# Patient Record
Sex: MALE | Race: WHITE | NOT HISPANIC OR LATINO | Employment: OTHER | URBAN - METROPOLITAN AREA
[De-identification: names, ages, dates, MRNs, and addresses within clinical notes are randomized per-mention and may not be internally consistent; named-entity substitution may affect disease eponyms.]

---

## 2017-01-30 ENCOUNTER — COMMUNICATION - HEALTHEAST (OUTPATIENT)
Dept: FAMILY MEDICINE | Facility: CLINIC | Age: 62
End: 2017-01-30

## 2017-01-30 DIAGNOSIS — E78.00 PURE HYPERCHOLESTEROLEMIA: ICD-10-CM

## 2017-02-09 ENCOUNTER — COMMUNICATION - HEALTHEAST (OUTPATIENT)
Dept: FAMILY MEDICINE | Facility: CLINIC | Age: 62
End: 2017-02-09

## 2017-02-09 DIAGNOSIS — E11.9 TYPE 2 DIABETES MELLITUS (H): ICD-10-CM

## 2017-02-09 DIAGNOSIS — I10 ESSENTIAL HYPERTENSION, BENIGN: ICD-10-CM

## 2017-03-08 ENCOUNTER — OFFICE VISIT - HEALTHEAST (OUTPATIENT)
Dept: FAMILY MEDICINE | Facility: CLINIC | Age: 62
End: 2017-03-08

## 2017-03-08 DIAGNOSIS — E11.9 TYPE 2 DIABETES MELLITUS (H): ICD-10-CM

## 2017-03-08 DIAGNOSIS — E78.00 PURE HYPERCHOLESTEROLEMIA: ICD-10-CM

## 2017-03-08 DIAGNOSIS — H61.92 SKIN LESION OF LEFT EAR: ICD-10-CM

## 2017-03-08 DIAGNOSIS — R12 HEARTBURN: ICD-10-CM

## 2017-03-08 DIAGNOSIS — I10 ESSENTIAL HYPERTENSION, BENIGN: ICD-10-CM

## 2017-03-08 LAB
CHOLEST SERPL-MCNC: 148 MG/DL
FASTING STATUS PATIENT QL REPORTED: YES
HBA1C MFR BLD: 8.9 % (ref 3.5–6)
HDLC SERPL-MCNC: 42 MG/DL
LDLC SERPL CALC-MCNC: 71 MG/DL
TRIGL SERPL-MCNC: 175 MG/DL

## 2017-03-08 RX ORDER — GLUCOSAMINE HCL/CHONDROITIN SU 500-400 MG
1 CAPSULE ORAL DAILY PRN
Qty: 100 EACH | Refills: 3 | Status: SHIPPED | OUTPATIENT
Start: 2017-03-08 | End: 2021-07-20

## 2017-04-26 ENCOUNTER — COMMUNICATION - HEALTHEAST (OUTPATIENT)
Dept: FAMILY MEDICINE | Facility: CLINIC | Age: 62
End: 2017-04-26

## 2017-04-26 DIAGNOSIS — E78.00 PURE HYPERCHOLESTEROLEMIA: ICD-10-CM

## 2017-05-03 ENCOUNTER — COMMUNICATION - HEALTHEAST (OUTPATIENT)
Dept: FAMILY MEDICINE | Facility: CLINIC | Age: 62
End: 2017-05-03

## 2017-05-25 ENCOUNTER — COMMUNICATION - HEALTHEAST (OUTPATIENT)
Dept: FAMILY MEDICINE | Facility: CLINIC | Age: 62
End: 2017-05-25

## 2017-06-02 ENCOUNTER — AMBULATORY - HEALTHEAST (OUTPATIENT)
Dept: EDUCATION SERVICES | Facility: CLINIC | Age: 62
End: 2017-06-02

## 2017-06-02 DIAGNOSIS — E11.65 UNCONTROLLED TYPE 2 DIABETES MELLITUS WITH HYPERGLYCEMIA, WITHOUT LONG-TERM CURRENT USE OF INSULIN (H): ICD-10-CM

## 2017-06-13 ENCOUNTER — OFFICE VISIT - HEALTHEAST (OUTPATIENT)
Dept: FAMILY MEDICINE | Facility: CLINIC | Age: 62
End: 2017-06-13

## 2017-06-13 ENCOUNTER — AMBULATORY - HEALTHEAST (OUTPATIENT)
Dept: EDUCATION SERVICES | Facility: CLINIC | Age: 62
End: 2017-06-13

## 2017-06-13 DIAGNOSIS — L98.9 SKIN LESION: ICD-10-CM

## 2017-06-13 DIAGNOSIS — E11.9 TYPE 2 DIABETES MELLITUS (H): ICD-10-CM

## 2017-06-13 DIAGNOSIS — E11.9 DIABETES MELLITUS WITHOUT COMPLICATION (H): ICD-10-CM

## 2017-06-13 DIAGNOSIS — R22.2 LUMP OF SKIN OF BACK: ICD-10-CM

## 2017-06-27 ENCOUNTER — AMBULATORY - HEALTHEAST (OUTPATIENT)
Dept: EDUCATION SERVICES | Facility: CLINIC | Age: 62
End: 2017-06-27

## 2017-06-27 ENCOUNTER — COMMUNICATION - HEALTHEAST (OUTPATIENT)
Dept: FAMILY MEDICINE | Facility: CLINIC | Age: 62
End: 2017-06-27

## 2017-06-27 DIAGNOSIS — E11.9 DIABETES MELLITUS WITHOUT COMPLICATION (H): ICD-10-CM

## 2017-06-28 ENCOUNTER — OFFICE VISIT - HEALTHEAST (OUTPATIENT)
Dept: SURGERY | Facility: CLINIC | Age: 62
End: 2017-06-28

## 2017-06-28 DIAGNOSIS — R22.9 LUMP OF SKIN: ICD-10-CM

## 2017-07-03 ENCOUNTER — AMBULATORY - HEALTHEAST (OUTPATIENT)
Dept: SURGERY | Facility: CLINIC | Age: 62
End: 2017-07-03

## 2017-07-14 ENCOUNTER — RECORDS - HEALTHEAST (OUTPATIENT)
Dept: ADMINISTRATIVE | Facility: OTHER | Age: 62
End: 2017-07-14

## 2017-07-14 ENCOUNTER — AMBULATORY - HEALTHEAST (OUTPATIENT)
Dept: SURGERY | Facility: CLINIC | Age: 62
End: 2017-07-14

## 2017-07-30 ENCOUNTER — COMMUNICATION - HEALTHEAST (OUTPATIENT)
Dept: FAMILY MEDICINE | Facility: CLINIC | Age: 62
End: 2017-07-30

## 2017-07-30 DIAGNOSIS — E78.00 PURE HYPERCHOLESTEROLEMIA: ICD-10-CM

## 2017-07-31 ENCOUNTER — RECORDS - HEALTHEAST (OUTPATIENT)
Dept: ADMINISTRATIVE | Facility: OTHER | Age: 62
End: 2017-07-31

## 2017-08-02 ENCOUNTER — OFFICE VISIT - HEALTHEAST (OUTPATIENT)
Dept: SURGERY | Facility: CLINIC | Age: 62
End: 2017-08-02

## 2017-08-02 DIAGNOSIS — Z48.89 POSTOPERATIVE VISIT: ICD-10-CM

## 2017-08-08 ENCOUNTER — RECORDS - HEALTHEAST (OUTPATIENT)
Dept: ADMINISTRATIVE | Facility: OTHER | Age: 62
End: 2017-08-08

## 2017-08-08 ENCOUNTER — AMBULATORY - HEALTHEAST (OUTPATIENT)
Dept: EDUCATION SERVICES | Facility: CLINIC | Age: 62
End: 2017-08-08

## 2017-08-08 DIAGNOSIS — E11.9 DIABETES MELLITUS WITHOUT COMPLICATION (H): ICD-10-CM

## 2017-09-11 ENCOUNTER — COMMUNICATION - HEALTHEAST (OUTPATIENT)
Dept: FAMILY MEDICINE | Facility: CLINIC | Age: 62
End: 2017-09-11

## 2017-09-11 ENCOUNTER — OFFICE VISIT - HEALTHEAST (OUTPATIENT)
Dept: FAMILY MEDICINE | Facility: CLINIC | Age: 62
End: 2017-09-11

## 2017-09-11 DIAGNOSIS — Z13.1 DM (DIABETES MELLITUS SCREEN): ICD-10-CM

## 2017-09-11 LAB — HBA1C MFR BLD: 5.8 % (ref 3.5–6)

## 2017-09-11 ASSESSMENT — MIFFLIN-ST. JEOR: SCORE: 1707.05

## 2017-10-16 ENCOUNTER — OFFICE VISIT - HEALTHEAST (OUTPATIENT)
Dept: FAMILY MEDICINE | Facility: CLINIC | Age: 62
End: 2017-10-16

## 2017-10-16 DIAGNOSIS — M54.9 BACK PAIN: ICD-10-CM

## 2017-10-16 ASSESSMENT — MIFFLIN-ST. JEOR: SCORE: 1707.9

## 2017-10-18 ENCOUNTER — HOSPITAL ENCOUNTER (OUTPATIENT)
Dept: MRI IMAGING | Facility: CLINIC | Age: 62
Discharge: HOME | End: 2017-10-18
Attending: FAMILY MEDICINE

## 2017-10-18 DIAGNOSIS — M54.9 BACK PAIN: ICD-10-CM

## 2017-10-25 ENCOUNTER — COMMUNICATION - HEALTHEAST (OUTPATIENT)
Dept: FAMILY MEDICINE | Facility: CLINIC | Age: 62
End: 2017-10-25

## 2017-10-25 DIAGNOSIS — E11.9 TYPE 2 DIABETES MELLITUS (H): ICD-10-CM

## 2017-10-25 DIAGNOSIS — I10 ESSENTIAL HYPERTENSION, BENIGN: ICD-10-CM

## 2017-10-25 DIAGNOSIS — E78.00 PURE HYPERCHOLESTEROLEMIA: ICD-10-CM

## 2017-10-26 ENCOUNTER — OFFICE VISIT - HEALTHEAST (OUTPATIENT)
Dept: FAMILY MEDICINE | Facility: CLINIC | Age: 62
End: 2017-10-26

## 2017-10-26 DIAGNOSIS — M54.9 BACKACHE: ICD-10-CM

## 2017-10-26 DIAGNOSIS — E11.9 TYPE 2 DIABETES MELLITUS (H): ICD-10-CM

## 2017-10-26 DIAGNOSIS — I10 ESSENTIAL HYPERTENSION, BENIGN: ICD-10-CM

## 2017-10-26 ASSESSMENT — MIFFLIN-ST. JEOR: SCORE: 1730.3

## 2017-10-30 ENCOUNTER — COMMUNICATION - HEALTHEAST (OUTPATIENT)
Dept: FAMILY MEDICINE | Facility: CLINIC | Age: 62
End: 2017-10-30

## 2017-10-30 DIAGNOSIS — E11.9 TYPE 2 DIABETES MELLITUS (H): ICD-10-CM

## 2018-01-25 ENCOUNTER — COMMUNICATION - HEALTHEAST (OUTPATIENT)
Dept: FAMILY MEDICINE | Facility: CLINIC | Age: 63
End: 2018-01-25

## 2018-01-25 DIAGNOSIS — E11.9 TYPE 2 DIABETES MELLITUS (H): ICD-10-CM

## 2018-01-25 DIAGNOSIS — E78.00 PURE HYPERCHOLESTEROLEMIA: ICD-10-CM

## 2018-04-13 ENCOUNTER — COMMUNICATION - HEALTHEAST (OUTPATIENT)
Dept: FAMILY MEDICINE | Facility: CLINIC | Age: 63
End: 2018-04-13

## 2018-04-13 DIAGNOSIS — E11.9 TYPE 2 DIABETES MELLITUS (H): ICD-10-CM

## 2018-04-15 ENCOUNTER — COMMUNICATION - HEALTHEAST (OUTPATIENT)
Dept: FAMILY MEDICINE | Facility: CLINIC | Age: 63
End: 2018-04-15

## 2018-04-15 DIAGNOSIS — I10 ESSENTIAL HYPERTENSION, BENIGN: ICD-10-CM

## 2018-04-15 DIAGNOSIS — N52.9 ED (ERECTILE DYSFUNCTION): ICD-10-CM

## 2018-04-15 DIAGNOSIS — E78.00 PURE HYPERCHOLESTEROLEMIA: ICD-10-CM

## 2018-04-18 ENCOUNTER — COMMUNICATION - HEALTHEAST (OUTPATIENT)
Dept: FAMILY MEDICINE | Facility: CLINIC | Age: 63
End: 2018-04-18

## 2018-04-18 DIAGNOSIS — E11.9 TYPE 2 DIABETES MELLITUS (H): ICD-10-CM

## 2018-04-19 ENCOUNTER — COMMUNICATION - HEALTHEAST (OUTPATIENT)
Dept: FAMILY MEDICINE | Facility: CLINIC | Age: 63
End: 2018-04-19

## 2018-04-19 DIAGNOSIS — N52.9 ED (ERECTILE DYSFUNCTION): ICD-10-CM

## 2018-04-23 ENCOUNTER — COMMUNICATION - HEALTHEAST (OUTPATIENT)
Dept: FAMILY MEDICINE | Facility: CLINIC | Age: 63
End: 2018-04-23

## 2018-04-23 DIAGNOSIS — N52.9 ED (ERECTILE DYSFUNCTION): ICD-10-CM

## 2018-06-12 ENCOUNTER — OFFICE VISIT - HEALTHEAST (OUTPATIENT)
Dept: FAMILY MEDICINE | Facility: CLINIC | Age: 63
End: 2018-06-12

## 2018-06-12 DIAGNOSIS — E11.9 TYPE 2 DIABETES MELLITUS (H): ICD-10-CM

## 2018-06-12 DIAGNOSIS — R97.20 ELEVATED PROSTATE SPECIFIC ANTIGEN (PSA): ICD-10-CM

## 2018-06-12 DIAGNOSIS — Z00.00 WELLNESS EXAMINATION: ICD-10-CM

## 2018-06-12 LAB
ERYTHROCYTE [DISTWIDTH] IN BLOOD BY AUTOMATED COUNT: 11.7 % (ref 11–14.5)
HBA1C MFR BLD: 5.6 % (ref 3.5–6)
HCT VFR BLD AUTO: 40.7 % (ref 40–54)
HGB BLD-MCNC: 13.9 G/DL (ref 14–18)
MCH RBC QN AUTO: 31.6 PG (ref 27–34)
MCHC RBC AUTO-ENTMCNC: 34.1 G/DL (ref 32–36)
MCV RBC AUTO: 93 FL (ref 80–100)
PLATELET # BLD AUTO: 301 THOU/UL (ref 140–440)
PMV BLD AUTO: 7.6 FL (ref 7–10)
PSA SERPL-MCNC: 9.7 NG/ML (ref 0–4.5)
RBC # BLD AUTO: 4.39 MILL/UL (ref 4.4–6.2)
WBC: 5.8 THOU/UL (ref 4–11)

## 2018-06-12 ASSESSMENT — MIFFLIN-ST. JEOR: SCORE: 1661.69

## 2018-06-14 ENCOUNTER — RECORDS - HEALTHEAST (OUTPATIENT)
Dept: ADMINISTRATIVE | Facility: OTHER | Age: 63
End: 2018-06-14

## 2018-06-25 ENCOUNTER — RECORDS - HEALTHEAST (OUTPATIENT)
Dept: ADMINISTRATIVE | Facility: OTHER | Age: 63
End: 2018-06-25

## 2018-07-12 ENCOUNTER — RECORDS - HEALTHEAST (OUTPATIENT)
Dept: ADMINISTRATIVE | Facility: OTHER | Age: 63
End: 2018-07-12

## 2018-07-16 ENCOUNTER — COMMUNICATION - HEALTHEAST (OUTPATIENT)
Dept: FAMILY MEDICINE | Facility: CLINIC | Age: 63
End: 2018-07-16

## 2018-07-16 DIAGNOSIS — I10 ESSENTIAL HYPERTENSION, BENIGN: ICD-10-CM

## 2018-07-16 DIAGNOSIS — E11.9 TYPE 2 DIABETES MELLITUS (H): ICD-10-CM

## 2018-07-19 ENCOUNTER — HOSPITAL ENCOUNTER (OUTPATIENT)
Dept: PET IMAGING | Facility: HOSPITAL | Age: 63
Discharge: HOME | End: 2018-07-19
Attending: UROLOGY

## 2018-07-19 DIAGNOSIS — C61 MALIGNANT NEOPLASM OF PROSTATE (H): ICD-10-CM

## 2018-07-23 ENCOUNTER — COMMUNICATION - HEALTHEAST (OUTPATIENT)
Dept: FAMILY MEDICINE | Facility: CLINIC | Age: 63
End: 2018-07-23

## 2018-07-23 DIAGNOSIS — I10 ESSENTIAL HYPERTENSION, BENIGN: ICD-10-CM

## 2018-07-23 DIAGNOSIS — E78.00 PURE HYPERCHOLESTEROLEMIA: ICD-10-CM

## 2018-07-25 ENCOUNTER — RECORDS - HEALTHEAST (OUTPATIENT)
Dept: RADIOLOGY | Facility: CLINIC | Age: 63
End: 2018-07-25

## 2018-07-25 ENCOUNTER — RECORDS - HEALTHEAST (OUTPATIENT)
Dept: ADMINISTRATIVE | Facility: OTHER | Age: 63
End: 2018-07-25

## 2018-07-27 ENCOUNTER — RECORDS - HEALTHEAST (OUTPATIENT)
Dept: ADMINISTRATIVE | Facility: OTHER | Age: 63
End: 2018-07-27

## 2018-08-07 ENCOUNTER — OFFICE VISIT - HEALTHEAST (OUTPATIENT)
Dept: FAMILY MEDICINE | Facility: CLINIC | Age: 63
End: 2018-08-07

## 2018-08-07 DIAGNOSIS — I10 ESSENTIAL HYPERTENSION, BENIGN: ICD-10-CM

## 2018-08-07 DIAGNOSIS — E66.3 OVERWEIGHT: ICD-10-CM

## 2018-08-07 DIAGNOSIS — E11.9 DM (DIABETES MELLITUS) (H): ICD-10-CM

## 2018-08-07 DIAGNOSIS — E78.00 PURE HYPERCHOLESTEROLEMIA: ICD-10-CM

## 2018-08-07 LAB
ANION GAP SERPL CALCULATED.3IONS-SCNC: 9 MMOL/L (ref 5–18)
BUN SERPL-MCNC: 16 MG/DL (ref 8–22)
CALCIUM SERPL-MCNC: 10.4 MG/DL (ref 8.5–10.5)
CHLORIDE BLD-SCNC: 103 MMOL/L (ref 98–107)
CO2 SERPL-SCNC: 25 MMOL/L (ref 22–31)
CREAT SERPL-MCNC: 1.39 MG/DL (ref 0.7–1.3)
ERYTHROCYTE [DISTWIDTH] IN BLOOD BY AUTOMATED COUNT: 11.9 % (ref 11–14.5)
GFR SERPL CREATININE-BSD FRML MDRD: 52 ML/MIN/1.73M2
GLUCOSE BLD-MCNC: 310 MG/DL (ref 70–125)
HCT VFR BLD AUTO: 41.9 % (ref 40–54)
HGB BLD-MCNC: 14.6 G/DL (ref 14–18)
MCH RBC QN AUTO: 31.2 PG (ref 27–34)
MCHC RBC AUTO-ENTMCNC: 34.9 G/DL (ref 32–36)
MCV RBC AUTO: 90 FL (ref 80–100)
PLATELET # BLD AUTO: 357 THOU/UL (ref 140–440)
PMV BLD AUTO: 7.4 FL (ref 7–10)
POTASSIUM BLD-SCNC: 4.9 MMOL/L (ref 3.5–5)
RBC # BLD AUTO: 4.68 MILL/UL (ref 4.4–6.2)
SODIUM SERPL-SCNC: 137 MMOL/L (ref 136–145)
WBC: 7 THOU/UL (ref 4–11)

## 2018-08-07 ASSESSMENT — MIFFLIN-ST. JEOR: SCORE: 1643.55

## 2018-08-19 ENCOUNTER — COMMUNICATION - HEALTHEAST (OUTPATIENT)
Dept: FAMILY MEDICINE | Facility: CLINIC | Age: 63
End: 2018-08-19

## 2018-08-19 DIAGNOSIS — E11.9 TYPE 2 DIABETES MELLITUS (H): ICD-10-CM

## 2018-08-27 ENCOUNTER — ANESTHESIA - HEALTHEAST (OUTPATIENT)
Dept: SURGERY | Facility: HOSPITAL | Age: 63
End: 2018-08-27

## 2018-08-28 ENCOUNTER — SURGERY - HEALTHEAST (OUTPATIENT)
Dept: SURGERY | Facility: HOSPITAL | Age: 63
End: 2018-08-28

## 2018-08-28 ASSESSMENT — MIFFLIN-ST. JEOR: SCORE: 1631.76

## 2018-09-05 ENCOUNTER — COMMUNICATION - HEALTHEAST (OUTPATIENT)
Dept: FAMILY MEDICINE | Facility: CLINIC | Age: 63
End: 2018-09-05

## 2018-09-05 DIAGNOSIS — E78.00 PURE HYPERCHOLESTEROLEMIA: ICD-10-CM

## 2018-10-24 ENCOUNTER — COMMUNICATION - HEALTHEAST (OUTPATIENT)
Dept: FAMILY MEDICINE | Facility: CLINIC | Age: 63
End: 2018-10-24

## 2018-10-24 DIAGNOSIS — E78.00 PURE HYPERCHOLESTEROLEMIA: ICD-10-CM

## 2018-12-13 ENCOUNTER — RECORDS - HEALTHEAST (OUTPATIENT)
Dept: ADMINISTRATIVE | Facility: OTHER | Age: 63
End: 2018-12-13

## 2018-12-13 ENCOUNTER — COMMUNICATION - HEALTHEAST (OUTPATIENT)
Dept: FAMILY MEDICINE | Facility: CLINIC | Age: 63
End: 2018-12-13

## 2018-12-19 ENCOUNTER — RECORDS - HEALTHEAST (OUTPATIENT)
Dept: ADMINISTRATIVE | Facility: OTHER | Age: 63
End: 2018-12-19

## 2018-12-20 ENCOUNTER — RECORDS - HEALTHEAST (OUTPATIENT)
Dept: ADMINISTRATIVE | Facility: OTHER | Age: 63
End: 2018-12-20

## 2019-02-22 ENCOUNTER — COMMUNICATION - HEALTHEAST (OUTPATIENT)
Dept: FAMILY MEDICINE | Facility: CLINIC | Age: 64
End: 2019-02-22

## 2019-02-22 DIAGNOSIS — I10 ESSENTIAL HYPERTENSION, BENIGN: ICD-10-CM

## 2019-02-22 DIAGNOSIS — E78.00 PURE HYPERCHOLESTEROLEMIA: ICD-10-CM

## 2019-03-06 ENCOUNTER — RECORDS - HEALTHEAST (OUTPATIENT)
Dept: ADMINISTRATIVE | Facility: OTHER | Age: 64
End: 2019-03-06

## 2019-03-26 ENCOUNTER — OFFICE VISIT - HEALTHEAST (OUTPATIENT)
Dept: FAMILY MEDICINE | Facility: CLINIC | Age: 64
End: 2019-03-26

## 2019-03-26 DIAGNOSIS — E11.9 DM (DIABETES MELLITUS) (H): ICD-10-CM

## 2019-03-26 DIAGNOSIS — M66.30 TENDON RUPTURE, NONTRAUMATIC, FLEXOR: ICD-10-CM

## 2019-03-26 LAB — HBA1C MFR BLD: 8.9 % (ref 3.5–6)

## 2019-04-09 ENCOUNTER — RECORDS - HEALTHEAST (OUTPATIENT)
Dept: ADMINISTRATIVE | Facility: OTHER | Age: 64
End: 2019-04-09

## 2019-05-19 ENCOUNTER — COMMUNICATION - HEALTHEAST (OUTPATIENT)
Dept: FAMILY MEDICINE | Facility: CLINIC | Age: 64
End: 2019-05-19

## 2019-05-19 DIAGNOSIS — E78.00 PURE HYPERCHOLESTEROLEMIA: ICD-10-CM

## 2019-05-19 DIAGNOSIS — I10 ESSENTIAL HYPERTENSION, BENIGN: ICD-10-CM

## 2019-05-20 ENCOUNTER — RECORDS - HEALTHEAST (OUTPATIENT)
Dept: ADMINISTRATIVE | Facility: OTHER | Age: 64
End: 2019-05-20

## 2019-06-24 ENCOUNTER — RECORDS - HEALTHEAST (OUTPATIENT)
Dept: ADMINISTRATIVE | Facility: OTHER | Age: 64
End: 2019-06-24

## 2019-07-05 ENCOUNTER — COMMUNICATION - HEALTHEAST (OUTPATIENT)
Dept: FAMILY MEDICINE | Facility: CLINIC | Age: 64
End: 2019-07-05

## 2019-07-09 ENCOUNTER — OFFICE VISIT - HEALTHEAST (OUTPATIENT)
Dept: FAMILY MEDICINE | Facility: CLINIC | Age: 64
End: 2019-07-09

## 2019-07-09 ENCOUNTER — COMMUNICATION - HEALTHEAST (OUTPATIENT)
Dept: FAMILY MEDICINE | Facility: CLINIC | Age: 64
End: 2019-07-09

## 2019-07-09 DIAGNOSIS — E11.9 DM (DIABETES MELLITUS) (H): ICD-10-CM

## 2019-07-09 DIAGNOSIS — I10 ESSENTIAL HYPERTENSION, BENIGN: ICD-10-CM

## 2019-07-09 DIAGNOSIS — E66.3 OVERWEIGHT: ICD-10-CM

## 2019-07-09 DIAGNOSIS — E78.00 PURE HYPERCHOLESTEROLEMIA: ICD-10-CM

## 2019-07-09 LAB
ANION GAP SERPL CALCULATED.3IONS-SCNC: 12 MMOL/L (ref 5–18)
BUN SERPL-MCNC: 16 MG/DL (ref 8–22)
CALCIUM SERPL-MCNC: 10.2 MG/DL (ref 8.5–10.5)
CHLORIDE BLD-SCNC: 104 MMOL/L (ref 98–107)
CHOLEST SERPL-MCNC: 145 MG/DL
CO2 SERPL-SCNC: 24 MMOL/L (ref 22–31)
CREAT SERPL-MCNC: 1.31 MG/DL (ref 0.7–1.3)
ERYTHROCYTE [DISTWIDTH] IN BLOOD BY AUTOMATED COUNT: 11.8 % (ref 11–14.5)
FASTING STATUS PATIENT QL REPORTED: YES
GFR SERPL CREATININE-BSD FRML MDRD: 55 ML/MIN/1.73M2
GLUCOSE BLD-MCNC: 193 MG/DL (ref 70–125)
HBA1C MFR BLD: 7.6 % (ref 3.5–6)
HCT VFR BLD AUTO: 41.7 % (ref 40–54)
HDLC SERPL-MCNC: 56 MG/DL
HGB BLD-MCNC: 14.6 G/DL (ref 14–18)
LDLC SERPL CALC-MCNC: 68 MG/DL
MCH RBC QN AUTO: 32.6 PG (ref 27–34)
MCHC RBC AUTO-ENTMCNC: 34.9 G/DL (ref 32–36)
MCV RBC AUTO: 93 FL (ref 80–100)
PLATELET # BLD AUTO: 314 THOU/UL (ref 140–440)
PMV BLD AUTO: 7.8 FL (ref 7–10)
POTASSIUM BLD-SCNC: 4.5 MMOL/L (ref 3.5–5)
RBC # BLD AUTO: 4.47 MILL/UL (ref 4.4–6.2)
SODIUM SERPL-SCNC: 140 MMOL/L (ref 136–145)
TRIGL SERPL-MCNC: 105 MG/DL
WBC: 6.1 THOU/UL (ref 4–11)

## 2019-08-14 ENCOUNTER — RECORDS - HEALTHEAST (OUTPATIENT)
Dept: ADMINISTRATIVE | Facility: OTHER | Age: 64
End: 2019-08-14

## 2019-08-30 ENCOUNTER — COMMUNICATION - HEALTHEAST (OUTPATIENT)
Dept: FAMILY MEDICINE | Facility: CLINIC | Age: 64
End: 2019-08-30

## 2019-08-30 DIAGNOSIS — E78.00 PURE HYPERCHOLESTEROLEMIA: ICD-10-CM

## 2019-08-30 DIAGNOSIS — I10 ESSENTIAL HYPERTENSION, BENIGN: ICD-10-CM

## 2019-09-25 ENCOUNTER — RECORDS - HEALTHEAST (OUTPATIENT)
Dept: ADMINISTRATIVE | Facility: OTHER | Age: 64
End: 2019-09-25

## 2019-12-06 ENCOUNTER — RECORDS - HEALTHEAST (OUTPATIENT)
Dept: ADMINISTRATIVE | Facility: OTHER | Age: 64
End: 2019-12-06

## 2019-12-06 LAB — RETINOPATHY: NEGATIVE

## 2019-12-21 ENCOUNTER — AMBULATORY - HEALTHEAST (OUTPATIENT)
Dept: MULTI SPECIALTY CLINIC | Facility: CLINIC | Age: 64
End: 2019-12-21

## 2019-12-23 ENCOUNTER — RECORDS - HEALTHEAST (OUTPATIENT)
Dept: ADMINISTRATIVE | Facility: OTHER | Age: 64
End: 2019-12-23

## 2020-01-09 ENCOUNTER — COMMUNICATION - HEALTHEAST (OUTPATIENT)
Dept: FAMILY MEDICINE | Facility: CLINIC | Age: 65
End: 2020-01-09

## 2020-01-09 DIAGNOSIS — E11.9 DM (DIABETES MELLITUS) (H): ICD-10-CM

## 2020-01-13 ENCOUNTER — COMMUNICATION - HEALTHEAST (OUTPATIENT)
Dept: FAMILY MEDICINE | Facility: CLINIC | Age: 65
End: 2020-01-13

## 2020-01-14 ENCOUNTER — COMMUNICATION - HEALTHEAST (OUTPATIENT)
Dept: FAMILY MEDICINE | Facility: CLINIC | Age: 65
End: 2020-01-14

## 2020-01-14 ENCOUNTER — OFFICE VISIT - HEALTHEAST (OUTPATIENT)
Dept: FAMILY MEDICINE | Facility: CLINIC | Age: 65
End: 2020-01-14

## 2020-01-14 DIAGNOSIS — E11.9 DM (DIABETES MELLITUS) (H): ICD-10-CM

## 2020-01-14 LAB — HBA1C MFR BLD: 7.2 % (ref 3.5–6)

## 2020-01-14 ASSESSMENT — MIFFLIN-ST. JEOR: SCORE: 1623.14

## 2020-01-29 ENCOUNTER — COMMUNICATION - HEALTHEAST (OUTPATIENT)
Dept: FAMILY MEDICINE | Facility: CLINIC | Age: 65
End: 2020-01-29

## 2020-02-11 ENCOUNTER — COMMUNICATION - HEALTHEAST (OUTPATIENT)
Dept: FAMILY MEDICINE | Facility: CLINIC | Age: 65
End: 2020-02-11

## 2020-02-11 DIAGNOSIS — E78.00 PURE HYPERCHOLESTEROLEMIA: ICD-10-CM

## 2020-02-19 ENCOUNTER — COMMUNICATION - HEALTHEAST (OUTPATIENT)
Dept: FAMILY MEDICINE | Facility: CLINIC | Age: 65
End: 2020-02-19

## 2020-02-19 DIAGNOSIS — E78.00 PURE HYPERCHOLESTEROLEMIA: ICD-10-CM

## 2020-02-26 ENCOUNTER — RECORDS - HEALTHEAST (OUTPATIENT)
Dept: ADMINISTRATIVE | Facility: OTHER | Age: 65
End: 2020-02-26

## 2020-03-09 ENCOUNTER — RECORDS - HEALTHEAST (OUTPATIENT)
Dept: ADMINISTRATIVE | Facility: OTHER | Age: 65
End: 2020-03-09

## 2020-03-19 ENCOUNTER — RECORDS - HEALTHEAST (OUTPATIENT)
Dept: ADMINISTRATIVE | Facility: OTHER | Age: 65
End: 2020-03-19

## 2020-07-05 ENCOUNTER — COMMUNICATION - HEALTHEAST (OUTPATIENT)
Dept: FAMILY MEDICINE | Facility: CLINIC | Age: 65
End: 2020-07-05

## 2020-07-05 DIAGNOSIS — E11.9 DM (DIABETES MELLITUS) (H): ICD-10-CM

## 2020-07-07 ENCOUNTER — OFFICE VISIT - HEALTHEAST (OUTPATIENT)
Dept: FAMILY MEDICINE | Facility: CLINIC | Age: 65
End: 2020-07-07

## 2020-07-07 DIAGNOSIS — E11.9 TYPE 2 DIABETES MELLITUS WITHOUT COMPLICATION, WITHOUT LONG-TERM CURRENT USE OF INSULIN (H): ICD-10-CM

## 2020-07-07 LAB
ANION GAP SERPL CALCULATED.3IONS-SCNC: 13 MMOL/L (ref 5–18)
BUN SERPL-MCNC: 18 MG/DL (ref 8–22)
CALCIUM SERPL-MCNC: 10 MG/DL (ref 8.5–10.5)
CHLORIDE BLD-SCNC: 105 MMOL/L (ref 98–107)
CO2 SERPL-SCNC: 23 MMOL/L (ref 22–31)
CREAT SERPL-MCNC: 1.18 MG/DL (ref 0.7–1.3)
GFR SERPL CREATININE-BSD FRML MDRD: >60 ML/MIN/1.73M2
GLUCOSE BLD-MCNC: 120 MG/DL (ref 70–125)
HBA1C MFR BLD: 6.2 % (ref 3.5–6)
POTASSIUM BLD-SCNC: 4.5 MMOL/L (ref 3.5–5)
SODIUM SERPL-SCNC: 141 MMOL/L (ref 136–145)

## 2020-07-08 ENCOUNTER — COMMUNICATION - HEALTHEAST (OUTPATIENT)
Dept: FAMILY MEDICINE | Facility: CLINIC | Age: 65
End: 2020-07-08

## 2020-07-09 ENCOUNTER — COMMUNICATION - HEALTHEAST (OUTPATIENT)
Dept: FAMILY MEDICINE | Facility: CLINIC | Age: 65
End: 2020-07-09

## 2020-07-09 DIAGNOSIS — E11.9 DM (DIABETES MELLITUS) (H): ICD-10-CM

## 2020-08-14 ENCOUNTER — COMMUNICATION - HEALTHEAST (OUTPATIENT)
Dept: FAMILY MEDICINE | Facility: CLINIC | Age: 65
End: 2020-08-14

## 2020-08-14 DIAGNOSIS — E78.00 PURE HYPERCHOLESTEROLEMIA: ICD-10-CM

## 2020-08-27 ENCOUNTER — RECORDS - HEALTHEAST (OUTPATIENT)
Dept: ADMINISTRATIVE | Facility: OTHER | Age: 65
End: 2020-08-27

## 2020-09-27 ENCOUNTER — COMMUNICATION - HEALTHEAST (OUTPATIENT)
Dept: FAMILY MEDICINE | Facility: CLINIC | Age: 65
End: 2020-09-27

## 2020-09-27 DIAGNOSIS — E78.00 PURE HYPERCHOLESTEROLEMIA: ICD-10-CM

## 2020-10-26 ENCOUNTER — RECORDS - HEALTHEAST (OUTPATIENT)
Dept: ADMINISTRATIVE | Facility: OTHER | Age: 65
End: 2020-10-26

## 2020-10-28 ENCOUNTER — COMMUNICATION - HEALTHEAST (OUTPATIENT)
Dept: FAMILY MEDICINE | Facility: CLINIC | Age: 65
End: 2020-10-28

## 2020-10-28 DIAGNOSIS — E78.00 PURE HYPERCHOLESTEROLEMIA: ICD-10-CM

## 2020-11-01 ENCOUNTER — COMMUNICATION - HEALTHEAST (OUTPATIENT)
Dept: FAMILY MEDICINE | Facility: CLINIC | Age: 65
End: 2020-11-01

## 2020-11-01 DIAGNOSIS — I10 ESSENTIAL HYPERTENSION, BENIGN: ICD-10-CM

## 2020-11-04 RX ORDER — LISINOPRIL/HYDROCHLOROTHIAZIDE 10-12.5 MG
TABLET ORAL
Qty: 90 TABLET | Refills: 2 | Status: SHIPPED | OUTPATIENT
Start: 2020-11-04 | End: 2021-07-09

## 2021-01-02 ENCOUNTER — COMMUNICATION - HEALTHEAST (OUTPATIENT)
Dept: FAMILY MEDICINE | Facility: CLINIC | Age: 66
End: 2021-01-02

## 2021-01-02 DIAGNOSIS — E11.9 DM (DIABETES MELLITUS) (H): ICD-10-CM

## 2021-01-04 ENCOUNTER — RECORDS - HEALTHEAST (OUTPATIENT)
Dept: ADMINISTRATIVE | Facility: OTHER | Age: 66
End: 2021-01-04

## 2021-01-05 ENCOUNTER — OFFICE VISIT - HEALTHEAST (OUTPATIENT)
Dept: FAMILY MEDICINE | Facility: CLINIC | Age: 66
End: 2021-01-05

## 2021-01-05 DIAGNOSIS — E78.00 PURE HYPERCHOLESTEROLEMIA: ICD-10-CM

## 2021-01-05 DIAGNOSIS — E11.9 TYPE 2 DIABETES MELLITUS WITHOUT COMPLICATION, WITHOUT LONG-TERM CURRENT USE OF INSULIN (H): ICD-10-CM

## 2021-01-05 DIAGNOSIS — Z00.00 WELLNESS EXAMINATION: ICD-10-CM

## 2021-01-05 LAB
ANION GAP SERPL CALCULATED.3IONS-SCNC: 12 MMOL/L (ref 5–18)
BUN SERPL-MCNC: 16 MG/DL (ref 8–22)
CALCIUM SERPL-MCNC: 10.2 MG/DL (ref 8.5–10.5)
CHLORIDE BLD-SCNC: 102 MMOL/L (ref 98–107)
CHOLEST SERPL-MCNC: 163 MG/DL
CO2 SERPL-SCNC: 27 MMOL/L (ref 22–31)
CREAT SERPL-MCNC: 1.4 MG/DL (ref 0.7–1.3)
ERYTHROCYTE [DISTWIDTH] IN BLOOD BY AUTOMATED COUNT: 13 % (ref 11–14.5)
FASTING STATUS PATIENT QL REPORTED: YES
GFR SERPL CREATININE-BSD FRML MDRD: 51 ML/MIN/1.73M2
GLUCOSE BLD-MCNC: 219 MG/DL (ref 70–125)
HBA1C MFR BLD: 7.7 %
HCT VFR BLD AUTO: 44.4 % (ref 40–54)
HDLC SERPL-MCNC: 66 MG/DL
HGB BLD-MCNC: 14.9 G/DL (ref 14–18)
LDLC SERPL CALC-MCNC: 75 MG/DL
MCH RBC QN AUTO: 32.1 PG (ref 27–34)
MCHC RBC AUTO-ENTMCNC: 33.5 G/DL (ref 32–36)
MCV RBC AUTO: 96 FL (ref 80–100)
PLATELET # BLD AUTO: 303 THOU/UL (ref 140–440)
PMV BLD AUTO: 7.6 FL (ref 7–10)
POTASSIUM BLD-SCNC: 4.7 MMOL/L (ref 3.5–5)
RBC # BLD AUTO: 4.64 MILL/UL (ref 4.4–6.2)
SODIUM SERPL-SCNC: 141 MMOL/L (ref 136–145)
TRIGL SERPL-MCNC: 111 MG/DL
WBC: 7.1 THOU/UL (ref 4–11)

## 2021-01-05 RX ORDER — SIMVASTATIN 80 MG
TABLET ORAL
Qty: 90 TABLET | Refills: 3 | Status: SHIPPED | OUTPATIENT
Start: 2021-01-05 | End: 2022-01-18

## 2021-01-06 ENCOUNTER — COMMUNICATION - HEALTHEAST (OUTPATIENT)
Dept: FAMILY MEDICINE | Facility: CLINIC | Age: 66
End: 2021-01-06

## 2021-02-23 ENCOUNTER — COMMUNICATION - HEALTHEAST (OUTPATIENT)
Dept: FAMILY MEDICINE | Facility: CLINIC | Age: 66
End: 2021-02-23

## 2021-02-23 DIAGNOSIS — E78.00 PURE HYPERCHOLESTEROLEMIA: ICD-10-CM

## 2021-03-03 ENCOUNTER — RECORDS - HEALTHEAST (OUTPATIENT)
Dept: ADMINISTRATIVE | Facility: OTHER | Age: 66
End: 2021-03-03

## 2021-05-18 ENCOUNTER — COMMUNICATION - HEALTHEAST (OUTPATIENT)
Dept: FAMILY MEDICINE | Facility: CLINIC | Age: 66
End: 2021-05-18

## 2021-05-18 DIAGNOSIS — E78.00 PURE HYPERCHOLESTEROLEMIA: ICD-10-CM

## 2021-05-19 RX ORDER — FENOFIBRATE 160 MG/1
TABLET ORAL
Qty: 90 TABLET | Refills: 0 | Status: SHIPPED | OUTPATIENT
Start: 2021-05-19 | End: 2021-07-23

## 2021-05-27 NOTE — PROGRESS NOTES
Chief Complaint   Patient presents with     Diabetes     follow up, fasting       HPI: Blu presents with multiple issues.  First is recheck of diabetes.  He continues on metformin and blood sugars remained stable.  His PSA was elevated and he is following up with [facility].    He also has a pain and bulging in the right hand over the mid head of the fifth metacarpal.    His hypertension remained stable on lisinopril hydrochlorthiazide.    ROS: No hypoglycemic episodes.  No neuropathy.    SH:    reports that  has never smoked. he has never used smokeless tobacco. He reports that he drinks about 1.2 oz of alcohol per week. He reports that he does not use drugs.      FH: The Patient's family history is not on file.     Meds:  Blu has a current medication list which includes the following prescription(s): acetaminophen, aspirin, blood glucose meter, blood glucose test, fenofibrate, generic lancets, lisinopril-hydrochlorothiazide, metformin, omeprazole, sildenafil (antihypertensive), and simvastatin.    O:  /64   Pulse (!) 55   Resp 16   Wt 197 lb (89.4 kg)   SpO2 98%   BMI 30.85 kg/m    Alert conversant no acute distress  Skin pink and dry  Psych-alert and oriented x3 with good memory insight and judgment  And-examination of the right hand volar aspect shows a bulging over the distal fifth metacarpal with mild pain  A/P:   1. DM (diabetes mellitus) (H)  The patient has not been exercising normally but would like to increase exercise after going through his prostate cancer ordeal.  We discussed diet, weight loss and rechecking A1c in 3 months.  - metFORMIN (GLUCOPHAGE) 500 MG tablet; Take 1 tablet (500 mg total) by mouth 2 (two) times a day with meals.  Dispense: 180 tablet; Refill: 1  - Glycosylated Hemoglobin A1c    2. Tendon rupture, nontraumatic, flexor  - Ambulatory referral to Orthopedics    RTC 3 months

## 2021-05-28 NOTE — TELEPHONE ENCOUNTER
RN cannot approve Refill Request    RN can NOT refill this medication PCP messaged that patient is overdue for Labs.       Allie Warner, [facility] Triage/Med Refill 5/20/2019    Requested Prescriptions   Pending Prescriptions Disp Refills     fenofibrate (TRIGLIDE) 160 MG tablet [Pharmacy Med Name: FENOFIBRATE 160 MG TABLET] 90 tablet 1     Sig: TAKE 1 TABLET BY MOUTH EVERY DAY       Fenofibrate Refill Protocol Failed - 5/19/2019  7:39 AM        Failed - Renal status in last 6 months     Creatinine   Date Value Ref Range Status   08/28/2018 1.50 (H) 0.70 - 1.30 mg/dL Final             Failed - Fasting lipid cascade in last 12 months     Cholesterol   Date Value Ref Range Status   03/08/2017 148 <=199 mg/dL Final     Triglycerides   Date Value Ref Range Status   03/08/2017 175 (H) <=149 mg/dL Final     HDL Cholesterol   Date Value Ref Range Status   03/08/2017 42 >=40 mg/dL Final     LDL Calculated   Date Value Ref Range Status   03/08/2017 71 <=129 mg/dL Final     Patient Fasting > 8hrs?   Date Value Ref Range Status   03/08/2017 Yes  Final             Failed - AST or ALT in last 12 months     AST   Date Value Ref Range Status   03/08/2017 31 0 - 40 U/L Final     ALT   Date Value Ref Range Status   03/08/2017 43 0 - 45 U/L Final               Passed - PCP or prescribing provider visit in past 12 months       Last office visit with prescriber/PCP: 3/26/2019 Jacobo Correa MD OR same dept: 3/26/2019 Jacobo Correa MD OR same specialty: 3/26/2019 Jacobo Correa MD  Last physical: 8/7/2018 Last MTM visit: Visit date not found   Next visit within 3 mo: Visit date not found  Next physical within 3 mo: Visit date not found  Prescriber OR PCP: Jacobo Correa MD  Last diagnosis associated with med order: 1. Benign Essential Hypertension  - lisinopril-hydrochlorothiazide (PRINZIDE,ZESTORETIC) 10-12.5 mg per tablet; TAKE 1 TABLET BY MOUTH EVERY DAY  Dispense: 90 tablet; Refill: 0    2.  Essential Hypercholesterolemia  - fenofibrate (TRIGLIDE) 160 MG tablet [Pharmacy Med Name: FENOFIBRATE 160 MG TABLET]; TAKE 1 TABLET BY MOUTH EVERY DAY  Dispense: 90 tablet; Refill: 1    If protocol passes may refill for 12 months if within 3 months of last provider visit (or a total of 15 months).           Signed Prescriptions Disp Refills    lisinopril-hydrochlorothiazide (PRINZIDE,ZESTORETIC) 10-12.5 mg per tablet 90 tablet 0     Sig: TAKE 1 TABLET BY MOUTH EVERY DAY       Diuretics/Combination Diuretics Refill Protocol  Passed - 5/19/2019  7:39 AM        Passed - Visit with PCP or prescribing provider visit in past 12 months     Last office visit with prescriber/PCP: 3/26/2019 Jacobo Correa MD OR same dept: 3/26/2019 Jacobo Correa MD OR same specialty: 3/26/2019 Jacobo Correa MD  Last physical: 8/7/2018 Last MTM visit: Visit date not found   Next visit within 3 mo: Visit date not found  Next physical within 3 mo: Visit date not found  Prescriber OR PCP: Jacobo Correa MD  Last diagnosis associated with med order: 1. Benign Essential Hypertension  - lisinopril-hydrochlorothiazide (PRINZIDE,ZESTORETIC) 10-12.5 mg per tablet; TAKE 1 TABLET BY MOUTH EVERY DAY  Dispense: 90 tablet; Refill: 0    2. Essential Hypercholesterolemia  - fenofibrate (TRIGLIDE) 160 MG tablet [Pharmacy Med Name: FENOFIBRATE 160 MG TABLET]; TAKE 1 TABLET BY MOUTH EVERY DAY  Dispense: 90 tablet; Refill: 1    If protocol passes may refill for 12 months if within 3 months of last provider visit (or a total of 15 months).             Passed - Serum Potassium in past 12 months      Lab Results   Component Value Date    Potassium 4.1 08/28/2018             Passed - Serum Sodium in past 12 months      Lab Results   Component Value Date    Sodium 139 08/28/2018             Passed - Blood pressure on file in past 12 months     BP Readings from Last 1 Encounters:   03/26/19 110/64             Passed - Serum Creatinine in  past 12 months      Creatinine   Date Value Ref Range Status   08/28/2018 1.50 (H) 0.70 - 1.30 mg/dL Final

## 2021-05-28 NOTE — TELEPHONE ENCOUNTER
Left message to call back for: patient  Information to relay to patient:  Refills send to pharmacy. Pt due for visit. H.DeGree, CMA

## 2021-05-30 VITALS — BODY MASS INDEX: 34.21 KG/M2 | WEIGHT: 218.4 LBS

## 2021-05-30 VITALS — BODY MASS INDEX: 34.61 KG/M2 | WEIGHT: 221 LBS

## 2021-05-30 NOTE — TELEPHONE ENCOUNTER
Routing to Dr. Correa - please review and advise on Monday.     Thank you     Joanna Martinez, CMA

## 2021-05-30 NOTE — PROGRESS NOTES
Chief Complaint   Patient presents with     Diabetes     Fasting        HPI: Patient presents today for recheck of his chronic medical issues including his diabetes, hypertension, hyperlipidemia, and his obesity.  His obesity continues and I have encouraged him to reduce weight.  Diabetes hypertension hyperlipidemia all remained stable.    ROS: No chest pain shortness of breath or paresthesias or neuropathy.    SH:    reports that he has never smoked. He has never used smokeless tobacco. He reports that he drinks about 1.2 oz of alcohol per week. He reports that he does not use drugs.      FH: The Patient's family history is not on file.     Meds:  Blu has a current medication list which includes the following prescription(s): acetaminophen, aspirin, blood glucose meter, blood glucose test, fenofibrate, generic lancets, lisinopril-hydrochlorothiazide, omeprazole, sildenafil, simvastatin, and metformin.    O:  /60   Pulse 61   Resp 16   Wt 194 lb 8 oz (88.2 kg)   SpO2 98%   BMI 30.46 kg/m     Psych-alert and oriented good memory insight and judgment  Alert conversant no acute distress  Skin pink and dry  Cardiovascular-no chest pain and no lower extremity edema    A/P:   1. DM (diabetes mellitus) (H)  - metFORMIN (GLUCOPHAGE) 1000 MG tablet; Take 1 tablet (1,000 mg total) by mouth 2 (two) times a day with meals.  Dispense: 180 tablet; Refill: 1  - HM2(CBC w/o Differential)  - Basic Metabolic Panel  - Glycosylated Hemoglobin A1c  - Lipid Cascade    2. Overweight  Encourage weight loss and increased exercise    3. Benign Essential Hypertension  Continue lisinopril Hydrocort thiazide    4. Essential Hypercholesterolemia  Continue statin    RTC 6 months

## 2021-05-30 NOTE — TELEPHONE ENCOUNTER
According to my note we remained at 500 two times a day.  Pt is overdue to be seen.  Please make an aptmt for him this week.

## 2021-05-31 VITALS — BODY MASS INDEX: 32.6 KG/M2 | HEIGHT: 68 IN | WEIGHT: 215.13 LBS

## 2021-05-31 VITALS — BODY MASS INDEX: 34.14 KG/M2 | WEIGHT: 218 LBS | WEIGHT: 218 LBS | BODY MASS INDEX: 34.14 KG/M2

## 2021-05-31 VITALS — BODY MASS INDEX: 34.43 KG/M2 | WEIGHT: 219.8 LBS

## 2021-05-31 VITALS — BODY MASS INDEX: 31.83 KG/M2 | WEIGHT: 210 LBS | HEIGHT: 68 IN

## 2021-05-31 VITALS — WEIGHT: 210.19 LBS | HEIGHT: 68 IN | BODY MASS INDEX: 31.86 KG/M2

## 2021-05-31 VITALS — WEIGHT: 213 LBS | BODY MASS INDEX: 33.36 KG/M2

## 2021-05-31 NOTE — TELEPHONE ENCOUNTER
Refill Approved    Rx renewed per Medication Renewal Policy. Medication was last renewed on 10/24/18.    Allie Warner, [facility] Triage/Med Refill 8/30/2019     Requested Prescriptions   Pending Prescriptions Disp Refills     simvastatin (ZOCOR) 80 MG tablet [Pharmacy Med Name: SIMVASTATIN 80 MG TABLET] 90 tablet 3     Sig: TAKE 1 TABLET BY MOUTH EVERYDAY AT BEDTIME       Statins Refill Protocol (Hmg CoA Reductase Inhibitors) Passed - 8/30/2019 11:05 AM        Passed - PCP or prescribing provider visit in past 12 months      Last office visit with prescriber/PCP: 6/13/2017 Elvia Harding MD OR same dept: 7/9/2019 Jacobo Correa MD OR same specialty: 7/9/2019 Jacobo Correa MD  Last physical: Visit date not found Last MTM visit: Visit date not found   Next visit within 3 mo: Visit date not found  Next physical within 3 mo: Visit date not found  Prescriber OR PCP: Elvia Harding MD  Last diagnosis associated with med order: 1. Essential Hypercholesterolemia  - simvastatin (ZOCOR) 80 MG tablet [Pharmacy Med Name: SIMVASTATIN 80 MG TABLET]; TAKE 1 TABLET BY MOUTH EVERYDAY AT BEDTIME  Dispense: 90 tablet; Refill: 3    If protocol passes may refill for 12 months if within 3 months of last provider visit (or a total of 15 months).

## 2021-05-31 NOTE — TELEPHONE ENCOUNTER
Refill Approved    Rx renewed per Medication Renewal Policy. Medication was last renewed on 5/20/19.    Allie Warner, [facility] Triage/Med Refill 8/30/2019     Requested Prescriptions   Pending Prescriptions Disp Refills     lisinopril-hydrochlorothiazide (PRINZIDE,ZESTORETIC) 10-12.5 mg per tablet [Pharmacy Med Name: LISINOPRIL-HCTZ 10-12.5 MG TAB] 90 tablet 0     Sig: TAKE 1 TABLET BY MOUTH EVERY DAY       Diuretics/Combination Diuretics Refill Protocol  Passed - 8/30/2019 11:05 AM        Passed - Visit with PCP or prescribing provider visit in past 12 months     Last office visit with prescriber/PCP: 7/9/2019 Jacobo Correa MD OR same dept: 7/9/2019 Jacobo Correa MD OR same specialty: 7/9/2019 Jacobo Correa MD  Last physical: 8/7/2018 Last MTM visit: Visit date not found   Next visit within 3 mo: Visit date not found  Next physical within 3 mo: Visit date not found  Prescriber OR PCP: Jacobo Correa MD  Last diagnosis associated with med order: 1. Benign Essential Hypertension  - lisinopril-hydrochlorothiazide (PRINZIDE,ZESTORETIC) 10-12.5 mg per tablet [Pharmacy Med Name: LISINOPRIL-HCTZ 10-12.5 MG TAB]; TAKE 1 TABLET BY MOUTH EVERY DAY  Dispense: 90 tablet; Refill: 0    If protocol passes may refill for 12 months if within 3 months of last provider visit (or a total of 15 months).             Passed - Serum Potassium in past 12 months      Lab Results   Component Value Date    Potassium 4.5 07/09/2019             Passed - Serum Sodium in past 12 months      Lab Results   Component Value Date    Sodium 140 07/09/2019             Passed - Blood pressure on file in past 12 months     BP Readings from Last 1 Encounters:   07/09/19 104/60             Passed - Serum Creatinine in past 12 months      Creatinine   Date Value Ref Range Status   07/09/2019 1.31 (H) 0.70 - 1.30 mg/dL Final

## 2021-06-01 VITALS — WEIGHT: 200 LBS | BODY MASS INDEX: 30.41 KG/M2

## 2021-06-01 VITALS — HEIGHT: 68 IN | BODY MASS INDEX: 29.7 KG/M2 | WEIGHT: 196 LBS

## 2021-06-01 VITALS — BODY MASS INDEX: 30.31 KG/M2 | HEIGHT: 68 IN | WEIGHT: 200 LBS

## 2021-06-01 VITALS — WEIGHT: 196.9 LBS | BODY MASS INDEX: 30.9 KG/M2 | HEIGHT: 67 IN

## 2021-06-02 VITALS — WEIGHT: 197 LBS | BODY MASS INDEX: 30.85 KG/M2

## 2021-06-03 VITALS — BODY MASS INDEX: 30.46 KG/M2 | WEIGHT: 194.5 LBS

## 2021-06-04 VITALS
OXYGEN SATURATION: 98 % | HEART RATE: 56 BPM | WEIGHT: 186 LBS | BODY MASS INDEX: 29.13 KG/M2 | DIASTOLIC BLOOD PRESSURE: 68 MMHG | SYSTOLIC BLOOD PRESSURE: 142 MMHG

## 2021-06-04 VITALS
BODY MASS INDEX: 30.61 KG/M2 | HEART RATE: 82 BPM | HEIGHT: 67 IN | SYSTOLIC BLOOD PRESSURE: 118 MMHG | WEIGHT: 195 LBS | DIASTOLIC BLOOD PRESSURE: 82 MMHG

## 2021-06-05 VITALS
SYSTOLIC BLOOD PRESSURE: 135 MMHG | DIASTOLIC BLOOD PRESSURE: 65 MMHG | OXYGEN SATURATION: 98 % | WEIGHT: 198.8 LBS | BODY MASS INDEX: 31.14 KG/M2 | HEART RATE: 52 BPM

## 2021-06-05 NOTE — TELEPHONE ENCOUNTER
Quality outreach    Diabetic eye exam.     Pt contacted -reports it was completed end of december 2019.\    Pt will requesting his be sent to our clinic. Otherwise, he will bring copy with him to his next appt.

## 2021-06-05 NOTE — TELEPHONE ENCOUNTER
RN cannot approve Refill Request    RN can NOT refill this medication PCP messaged that patient is overdue for Labs. Last office visit: 7/9/2019 Jacobo Correa MD Last Physical: 8/7/2018 Last MTM visit: Visit date not found Last visit same specialty: 7/9/2019 Jacobo Correa MD.  Next visit within 3 mo: Visit date not found  Next physical within 3 mo: Visit date not found      Doris Hicks, [facility] Triage/Med Refill 1/13/2020    Requested Prescriptions   Pending Prescriptions Disp Refills     metFORMIN (GLUCOPHAGE) 1000 MG tablet [Pharmacy Med Name: METFORMIN HCL 1,000 MG TABLET] 180 tablet 0     Sig: TAKE 1 TABLET BY MOUTH TWICE A DAY WITH MEALS       Metformin Refill Protocol Failed - 1/9/2020 11:28 AM        Failed - LFT or AST or ALT in last 12 months     Albumin   Date Value Ref Range Status   03/08/2017 4.0 3.5 - 5.0 g/dL Final     Bilirubin, Total   Date Value Ref Range Status   03/08/2017 0.5 0.0 - 1.0 mg/dL Final     Bilirubin, Direct   Date Value Ref Range Status   04/19/2013 0.2 <0.6 mg/dL Final     Alkaline Phosphatase   Date Value Ref Range Status   03/08/2017 50 45 - 120 U/L Final     AST   Date Value Ref Range Status   03/08/2017 31 0 - 40 U/L Final     ALT   Date Value Ref Range Status   03/08/2017 43 0 - 45 U/L Final     Protein, Total   Date Value Ref Range Status   03/08/2017 7.2 6.0 - 8.0 g/dL Final                Failed - Visit with PCP or prescribing provider visit in last 6 months or next 3 months     Last office visit with prescriber/PCP: Visit date not found OR same dept: 7/9/2019 Jacobo Correa MD OR same specialty: 7/9/2019 Jacobo Correa MD Last physical: Visit date not found Last MTM visit: Visit date not found         Next appt within 3 mo: Visit date not found  Next physical within 3 mo: Visit date not found  Prescriber OR PCP: Jacobo Correa MD  Last diagnosis associated with med order: 1. DM (diabetes mellitus) (H)  - metFORMIN (GLUCOPHAGE)  1000 MG tablet [Pharmacy Med Name: METFORMIN HCL 1,000 MG TABLET]; TAKE 1 TABLET BY MOUTH TWICE A DAY WITH MEALS  Dispense: 180 tablet; Refill: 0     If protocol passes may refill for 12 months if within 3 months of last provider visit (or a total of 15 months).           Failed - A1C in last 6 months     Hemoglobin A1c   Date Value Ref Range Status   07/09/2019 7.6 (H) 3.5 - 6.0 % Final               Failed - Microalbumin in last year      Microalbumin, Random Urine   Date Value Ref Range Status   07/15/2015 0.90 0.00 - 1.99 mg/dL Final                  Passed - Blood pressure in last 12 months     BP Readings from Last 1 Encounters:   07/09/19 104/60             Passed - GFR or Serum Creatinine in last 6 months     GFR MDRD Non Af Amer   Date Value Ref Range Status   07/09/2019 55 (L) >60 mL/min/1.73m2 Final     GFR MDRD Af Amer   Date Value Ref Range Status   07/09/2019 >60 >60 mL/min/1.73m2 Final

## 2021-06-05 NOTE — PROGRESS NOTES
"Chief Complaint   Patient presents with     Medication Refill     med check , fasting labs        HPI: Patient presents today for recheck of his multiple issues.  His diabetes is stable and he notes no diabetic neuropathy, no diabetic retinopathy, and no hypoglycemic episodes.  He is losing weight, and exercising 4 times per week at [employer].    Hypertension remained stable on lisinopril hydrochlorothiazide.    Hyperlipidemia remained stable and he has no side effects from his statin.    ROS: No hypoglycemia no neuropathy    SH:    reports that he has never smoked. He has never used smokeless tobacco. He reports current alcohol use of about 2.0 standard drinks of alcohol per week. He reports that he does not use drugs.      FH: The Patient's family history is not on file.     Meds: Blu has a current medication list which includes the following prescription(s): acetaminophen, aspirin, blood glucose meter, blood glucose test, fenofibrate, generic lancets, lisinopril-hydrochlorothiazide, omeprazole, sildenafil, simvastatin, and metformin.    O:  /82   Pulse 82   Ht 5' 7\" (1.702 m)   Wt 195 lb (88.5 kg)   BMI 30.54 kg/m    Constitutional:    --Vitals as above  --No acute distress  Eyes-  --Sclera noninjected  --Lids and conjunctiva normal  Skin-  --Pink and dry    A/P:   1. DM (diabetes mellitus) (H)  Patient's diabetes is doing well.  Will check A1c today.  Encouraged continued metformin use.  - metFORMIN (GLUCOPHAGE) 1000 MG tablet; Take 1 tablet (1,000 mg total) by mouth 2 (two) times a day with meals.  Dispense: 180 tablet; Refill: 1  - Glycosylated Hemoglobin A1c    2.  Hypertension  -Stable  -Continue hydrochlorothiazide and lisinopril    3.  Hyperlipidemia  -Continue statin    RTC 6 months                                  "

## 2021-06-06 NOTE — TELEPHONE ENCOUNTER
RN cannot approve Refill Request    RN can NOT refill this medication PCP messaged that patient is overdue for Labs. Last office visit: 1/14/2020 Jacobo Correa MD Last Physical: 8/7/2018 Last MTM visit: Visit date not found Last visit same specialty: 1/14/2020 Jacobo Correa MD.  Next visit within 3 mo: Visit date not found  Next physical within 3 mo: Visit date not found      Florence Alvarenga, [facility] Triage/Med Refill 2/15/2020    Requested Prescriptions   Pending Prescriptions Disp Refills     fenofibrate (TRIGLIDE) 160 MG tablet [Pharmacy Med Name: FENOFIBRATE 160 MG TABLET] 90 tablet 0     Sig: TAKE 1 TABLET BY MOUTH EVERY DAY       Fenofibrate Refill Protocol Failed - 2/11/2020  1:46 AM        Failed - Renal status in last 6 months     Creatinine   Date Value Ref Range Status   07/09/2019 1.31 (H) 0.70 - 1.30 mg/dL Final             Failed - AST or ALT in last 12 months     AST   Date Value Ref Range Status   03/08/2017 31 0 - 40 U/L Final     ALT   Date Value Ref Range Status   03/08/2017 43 0 - 45 U/L Final               Passed - Fasting lipid cascade in last 12 months     Cholesterol   Date Value Ref Range Status   07/09/2019 145 <=199 mg/dL Final     Triglycerides   Date Value Ref Range Status   07/09/2019 105 <=149 mg/dL Final     HDL Cholesterol   Date Value Ref Range Status   07/09/2019 56 >=40 mg/dL Final     LDL Calculated   Date Value Ref Range Status   07/09/2019 68 <=129 mg/dL Final     Patient Fasting > 8hrs?   Date Value Ref Range Status   07/09/2019 Yes  Final             Passed - PCP or prescribing provider visit in past 12 months       Last office visit with prescriber/PCP: 1/14/2020 Jacobo Correa MD OR same dept: 1/14/2020 Jacobo Correa MD OR same specialty: 1/14/2020 Jacobo Correa MD  Last physical: 8/7/2018 Last MTM visit: Visit date not found   Next visit within 3 mo: Visit date not found  Next physical within 3 mo: Visit date not  found  Prescriber OR PCP: Jacobo Correa MD  Last diagnosis associated with med order: 1. Essential Hypercholesterolemia  - fenofibrate (TRIGLIDE) 160 MG tablet [Pharmacy Med Name: FENOFIBRATE 160 MG TABLET]; TAKE 1 TABLET BY MOUTH EVERY DAY  Dispense: 90 tablet; Refill: 0    If protocol passes may refill for 12 months if within 3 months of last provider visit (or a total of 15 months).

## 2021-06-09 NOTE — PROGRESS NOTES
"Cryotherapy, skin lesion  Date/Time: 3/8/2017 7:32 AM  Performed by: SYLVIA CAO  Authorized by: SYLVIA CAO   Consent: Verbal consent obtained.  Risks and benefits: risks, benefits and alternatives were discussed  Consent given by: patient  Patient understanding: patient states understanding of the procedure being performed  Patient consent: the patient's understanding of the procedure matches consent given  Procedure consent: procedure consent matches procedure scheduled  Site marked: the operative site was marked  Required items: required blood products, implants, devices, and special equipment available  Patient identity confirmed: verbally with patient  Time out: Immediately prior to procedure a \"time out\" was called to verify the correct patient, procedure, equipment, support staff and site/side marked as required.  Preparation: Patient was prepped and draped in the usual sterile fashion.  Local anesthesia used: no    Anesthesia:  Local anesthesia used: no  Sedation:  Patient sedated: no    Patient tolerance: Patient tolerated the procedure well with no immediate complications        Procedure note:    Consent: Risks and benefits of therapy discussed with patient  who voices understanding and agrees with planned care. No barriers to communication or understanding identified.  After obtaining informed consent, the patient's identity, procedure, and site were verified during a pause prior to proceeding with the minor surgical procedure as per universal protocol recommendations.  Actinic keratosis was treated with cryocautery with freeze thaw freeze technique with 2-3 mm surround freeze. Local care discussed. Side effects of treatment and precautions discussed. All questions answered. To follow up if worse or any new problems      "

## 2021-06-09 NOTE — TELEPHONE ENCOUNTER
RN cannot approve Refill Request    RN can NOT refill this medication PCP messaged that patient is overdue for Labs. Last office visit: 1/14/2020 Jacobo Correa MD Last Physical: 8/7/2018 Last MTM visit: Visit date not found Last visit same specialty: 1/14/2020 Jacobo Correa MD.  Next visit within 3 mo: Visit date not found  Next physical within 3 mo: Visit date not found      Latesha Phelps, [facility] Triage/Med Refill 7/5/2020    Requested Prescriptions   Pending Prescriptions Disp Refills     metFORMIN (GLUCOPHAGE) 1000 MG tablet [Pharmacy Med Name: METFORMIN HCL 1,000 MG TABLET] 180 tablet 1     Sig: TAKE 1 TABLET BY MOUTH TWICE A DAY WITH MEALS       Metformin Refill Protocol Failed - 7/5/2020  9:47 AM        Failed - LFT or AST or ALT in last 12 months     Albumin   Date Value Ref Range Status   03/08/2017 4.0 3.5 - 5.0 g/dL Final     Bilirubin, Total   Date Value Ref Range Status   03/08/2017 0.5 0.0 - 1.0 mg/dL Final     Bilirubin, Direct   Date Value Ref Range Status   04/19/2013 0.2 <0.6 mg/dL Final     Alkaline Phosphatase   Date Value Ref Range Status   03/08/2017 50 45 - 120 U/L Final     AST   Date Value Ref Range Status   03/08/2017 31 0 - 40 U/L Final     ALT   Date Value Ref Range Status   03/08/2017 43 0 - 45 U/L Final     Protein, Total   Date Value Ref Range Status   03/08/2017 7.2 6.0 - 8.0 g/dL Final                Failed - Microalbumin in last year      Microalbumin, Random Urine   Date Value Ref Range Status   07/15/2015 0.90 0.00 - 1.99 mg/dL Final                  Passed - Blood pressure in last 12 months     BP Readings from Last 1 Encounters:   01/14/20 118/82             Passed - GFR or Serum Creatinine in last 6 months     GFR MDRD Non Af Amer   Date Value Ref Range Status   07/09/2019 55 (L) >60 mL/min/1.73m2 Final     GFR MDRD Af Amer   Date Value Ref Range Status   07/09/2019 >60 >60 mL/min/1.73m2 Final             Passed - Visit with PCP or prescribing  provider visit in last 6 months or next 3 months     Last office visit with prescriber/PCP: 1/14/2020 OR same dept: 1/14/2020 Jacobo Correa MD OR same specialty: 1/14/2020 Jacobo Correa MD Last physical: Visit date not found Last MTM visit: Visit date not found         Next appt within 3 mo: Visit date not found  Next physical within 3 mo: Visit date not found  Prescriber OR PCP: Jacobo Correa MD  Last diagnosis associated with med order: 1. DM (diabetes mellitus) (H)  - metFORMIN (GLUCOPHAGE) 1000 MG tablet [Pharmacy Med Name: METFORMIN HCL 1,000 MG TABLET]; TAKE 1 TABLET BY MOUTH TWICE A DAY WITH MEALS  Dispense: 180 tablet; Refill: 1     If protocol passes may refill for 12 months if within 3 months of last provider visit (or a total of 15 months).           Passed - A1C in last 6 months     Hemoglobin A1c   Date Value Ref Range Status   01/14/2020 7.2 (H) 3.5 - 6.0 % Final

## 2021-06-09 NOTE — TELEPHONE ENCOUNTER
RN cannot approve Refill Request    RN can NOT refill this medication PCP messaged that patient is overdue for Labs. Last office visit: 7/7/2020 Jacobo Correa MD Last Physical: 8/7/2018 Last MTM visit: Visit date not found Last visit same specialty: 7/7/2020 Jacobo Correa MD.  Next visit within 3 mo: Visit date not found  Next physical within 3 mo: Visit date not found      Latesha Phelps, [facility] Triage/Med Refill 7/11/2020    Requested Prescriptions   Pending Prescriptions Disp Refills     metFORMIN (GLUCOPHAGE) 1000 MG tablet [Pharmacy Med Name: METFORMIN HCL 1,000 MG TABLET] 180 tablet 1     Sig: TAKE 1 TABLET BY MOUTH TWICE A DAY WITH MEALS       Metformin Refill Protocol Failed - 7/9/2020  5:04 PM        Failed - LFT or AST or ALT in last 12 months     Albumin   Date Value Ref Range Status   03/08/2017 4.0 3.5 - 5.0 g/dL Final     Bilirubin, Total   Date Value Ref Range Status   03/08/2017 0.5 0.0 - 1.0 mg/dL Final     Bilirubin, Direct   Date Value Ref Range Status   04/19/2013 0.2 <0.6 mg/dL Final     Alkaline Phosphatase   Date Value Ref Range Status   03/08/2017 50 45 - 120 U/L Final     AST   Date Value Ref Range Status   03/08/2017 31 0 - 40 U/L Final     ALT   Date Value Ref Range Status   03/08/2017 43 0 - 45 U/L Final     Protein, Total   Date Value Ref Range Status   03/08/2017 7.2 6.0 - 8.0 g/dL Final                Failed - Microalbumin in last year      Microalbumin, Random Urine   Date Value Ref Range Status   07/15/2015 0.90 0.00 - 1.99 mg/dL Final                  Passed - Blood pressure in last 12 months     BP Readings from Last 1 Encounters:   07/07/20 142/68             Passed - GFR or Serum Creatinine in last 6 months     GFR MDRD Non Af Amer   Date Value Ref Range Status   07/07/2020 >60 >60 mL/min/1.73m2 Final     GFR MDRD Af Amer   Date Value Ref Range Status   07/07/2020 >60 >60 mL/min/1.73m2 Final             Passed - Visit with PCP or prescribing provider  visit in last 6 months or next 3 months     Last office visit with prescriber/PCP: 7/7/2020 OR same dept: 7/7/2020 Jacobo Correa MD OR same specialty: 7/7/2020 Jacobo Correa MD Last physical: Visit date not found Last MTM visit: Visit date not found         Next appt within 3 mo: Visit date not found  Next physical within 3 mo: Visit date not found  Prescriber OR PCP: Jacobo Correa MD  Last diagnosis associated with med order: 1. DM (diabetes mellitus) (H)  - metFORMIN (GLUCOPHAGE) 1000 MG tablet [Pharmacy Med Name: METFORMIN HCL 1,000 MG TABLET]; TAKE 1 TABLET BY MOUTH TWICE A DAY WITH MEALS  Dispense: 180 tablet; Refill: 1     If protocol passes may refill for 12 months if within 3 months of last provider visit (or a total of 15 months).           Passed - A1C in last 6 months     Hemoglobin A1c   Date Value Ref Range Status   07/07/2020 6.2 (H) 3.5 - 6.0 % Final

## 2021-06-09 NOTE — PROGRESS NOTES
Chief Complaint   Patient presents with     Diabetes     Overall feels well       HPI: Patient comes in today for diabetic recheck.  His blood sugars are not checked by him at home.  He has increased his exercise since being home from the coronavirus crisis.  No hypoglycemic episodes.  Continues on metformin.    ROS: Hypoglycemia.  No neuropathy.  No vision changes.    SH: Reviewed-see Snapshot for review.     FH: Reviewed-see Snapshot for review.    Meds:  Blu has a current medication list which includes the following prescription(s): acetaminophen, aspirin, blood glucose meter, blood glucose test, fenofibrate, generic lancets, lisinopril-hydrochlorothiazide, simvastatin, metformin, omeprazole, and sildenafil.    O:  /68   Pulse (!) 56   Wt 186 lb (84.4 kg)   SpO2 98%   BMI 29.13 kg/m    Constitutional:    --Vitals as above  --No acute distress  Eyes-  --Sclera noninjected  --Lids and conjunctiva normal  Skin-  --Pink and dry    A/P:   1. Type 2 diabetes mellitus without complication, without long-term current use of insulin (H)  His diabetes is well controlled and A1c today is 6.2 which is excellent.  We discussed continuing exercise and I have encouraged him to lose weight.  I set a weight goal of 170 for him and we may be able to start weaning off of metformin if A1c is continue to decrease.    RTC 6 months  - Glycosylated Hemoglobin A1c  - Basic Metabolic Panel

## 2021-06-10 NOTE — TELEPHONE ENCOUNTER
RN cannot approve Refill Request    RN can NOT refill this medication Protocol failed and NO refill given. Last office visit: 7/7/2020 Jacobo Correa MD Last Physical: 8/7/2018 Last MTM visit: Visit date not found Last visit same specialty: 7/7/2020 Jacobo Correa MD.  Next visit within 3 mo: Visit date not found  Next physical within 3 mo: Visit date not found      Allie Warner, [facility] Triage/Med Refill 8/14/2020    Requested Prescriptions   Pending Prescriptions Disp Refills     fenofibrate (TRIGLIDE) 160 MG tablet [Pharmacy Med Name: Fenofibrate Oral Tablet 160 MG] 90 tablet 0     Sig: TAKE ONE TABLET BY MOUTH ONE TIME DAILY       Fenofibrate Refill Protocol Failed - 8/14/2020 12:54 PM        Failed - Fasting lipid cascade in last 12 months     Cholesterol   Date Value Ref Range Status   07/09/2019 145 <=199 mg/dL Final     Triglycerides   Date Value Ref Range Status   07/09/2019 105 <=149 mg/dL Final     HDL Cholesterol   Date Value Ref Range Status   07/09/2019 56 >=40 mg/dL Final     LDL Calculated   Date Value Ref Range Status   07/09/2019 68 <=129 mg/dL Final     Patient Fasting > 8hrs?   Date Value Ref Range Status   07/09/2019 Yes  Final             Failed - AST or ALT in last 12 months     AST   Date Value Ref Range Status   03/08/2017 31 0 - 40 U/L Final     ALT   Date Value Ref Range Status   03/08/2017 43 0 - 45 U/L Final               Passed - Renal status in last 6 months     Creatinine   Date Value Ref Range Status   07/07/2020 1.18 0.70 - 1.30 mg/dL Final             Passed - PCP or prescribing provider visit in past 12 months       Last office visit with prescriber/PCP: 7/7/2020 Jacobo Correa MD OR same dept: 7/7/2020 Jacobo Correa MD OR same specialty: 7/7/2020 Jacobo Correa MD  Last physical: 8/7/2018 Last MTM visit: Visit date not found   Next visit within 3 mo: Visit date not found  Next physical within 3 mo: Visit date not found  Prescriber OR  PCP: Jacobo Correa MD  Last diagnosis associated with med order: 1. Essential Hypercholesterolemia  - fenofibrate (TRIGLIDE) 160 MG tablet [Pharmacy Med Name: Fenofibrate Oral Tablet 160 MG]; TAKE ONE TABLET BY MOUTH ONE TIME DAILY   Dispense: 90 tablet; Refill: 0    If protocol passes may refill for 12 months if within 3 months of last provider visit (or a total of 15 months).

## 2021-06-11 NOTE — PROGRESS NOTES
Blu is scheduled for excision of back cyst with Dr. Bañuelos on 7/14/17 at [facility]. Patient was given instructions of arrival time, need a , and NPO after midnight. Patient verbalized understanding.     Dr. Bañuelos will do pre-op for surgery      Elham Moran CMA  Physician    [facility] Surgery   [phone]

## 2021-06-11 NOTE — PROGRESS NOTES
HPI: Blu Allen is a 61 y.o. male referred to see me by Elvia Harding MD for a lump in the back region.  He notes  a several year history of the lump and has had it excised in the past.  He has recurred over the past year or so and is now enlarging.  He would like to have it removed before becomes infected.  He denies any nausea, vomiting, fevers, chills or any other symptoms at present.       Allergies:Review of patient's allergies indicates no known allergies.    Past Medical History:   Diagnosis Date     Cancer 04/2017    BCC on back     Diabetes mellitus      GERD (gastroesophageal reflux disease)      High cholesterol      Hypertension        Past Surgical History:   Procedure Laterality Date     GANGLION CYST EXCISION       PILONIDAL CYST / SINUS EXCISION       vastectomy         CURRENT MEDS:    Current Outpatient Prescriptions:      acetaminophen (TYLENOL) 500 MG tablet, Take 500-1,000 mg by mouth every 6 (six) hours as needed for pain., Disp: , Rfl:      albiglutide (TANZEUM) 50 mg/0.5 mL PnIj, Inject 50 mg under the skin every 7 days., Disp: 4 each, Rfl: 3     aspirin 81 MG EC tablet, Take 81 mg by mouth every evening. , Disp: , Rfl:      blood glucose meter (GLUCOMETER), Use 1 each As Directed as needed. Dispense glucometer brand per patient's insurance at pharmacy discretion., Disp: 1 each, Rfl: 0     blood glucose test strips, Use 1 each As Directed daily as needed. Dispense brand per patient's insurance at pharmacy discretion., Disp: 100 each, Rfl: 3     fenofibrate (TRIGLIDE) 160 MG tablet, TAKE 1 TABLET (160 MG TOTAL) BY MOUTH DAILY., Disp: 90 tablet, Rfl: 0     generic lancets, Use 1 each As Directed daily as needed. Dispense brand per patient's insurance at pharmacy discretion., Disp: 100 each, Rfl: 3     glipiZIDE (GLUCOTROL) 10 MG 24 hr tablet, Take 1 tablet (10 mg total) by mouth daily., Disp: 180 tablet, Rfl: 1     lisinopril-hydrochlorothiazide (PRINZIDE,ZESTORETIC) 10-12.5 mg  per tablet, Take 1 tablet by mouth daily., Disp: 90 tablet, Rfl: 1     metFORMIN (GLUCOPHAGE) 1000 MG tablet, Take 1 tablet (1,000 mg total) by mouth 2 (two) times a day with meals., Disp: 180 tablet, Rfl: 1     omeprazole (PRILOSEC) 20 MG capsule, Take 1 capsule (20 mg total) by mouth daily., Disp: 90 capsule, Rfl: 3     simvastatin (ZOCOR) 80 MG tablet, Take 1 tablet (80 mg total) by mouth bedtime., Disp: 90 tablet, Rfl: 1    History reviewed. No pertinent family history.     reports that he has never smoked. He has never used smokeless tobacco. He reports that he drinks about 1.2 oz of alcohol per week  He reports that he does not use illicit drugs.    Review of Systems:  The 12 system review is within normal limits except for as mentioned above.  General ROS: No complaints or constitutional symptoms  Ophthalmic ROS: No complaints of visual changes  Skin: As per HPI  Endocrine: No complaints or symptoms  Hematologic/Lymphatic: No symptoms or complaints  Psychiatric: No symptoms or complaints  Respiratory ROS: no cough, shortness of breath, or wheezing  Cardiovascular ROS: no chest pain or dyspnea on exertion  Gastrointestinal ROS: No complaints of pain or other symptoms  Genito-Urinary ROS: no dysuria, trouble voiding, or hematuria  Musculoskeletal ROS: no joint or muscle pain  Neurological ROS: no TIA or stroke symptoms      EXAM:  There were no vitals taken for this visit.  GENERAL: Well developed male, No acute distress, pleasant and conversant   EYES: Pupils equal, round and reactive, no scleral icterus  CARDIAC: Regular rate and rhythm, no obvious murmurs noted  CHEST/LUNG: Clear to ascultation bilaterally, No ronchi, No wheezes  ABDOMEN: Soft, non tender, no masses  SKIN: Pink, warm and dry; mid back-1 cm nodular cyst consistent with epidermal inclusion cyst with evident scar, nontender no stigmata of infection  NEURO:No focal deficits, ambulatory  MUSCULOSKELETAL:No obvious deformities, no swelling, normal  appearing      LABS:  Lab Results   Component Value Date    WBC 6.9 03/08/2017    HGB 14.5 03/08/2017    HCT 41.5 03/08/2017    MCV 91 03/08/2017     03/08/2017     INR/Prothrombin Time      Lab Results   Component Value Date    ALT 43 03/08/2017    AST 31 03/08/2017    ALKPHOS 50 03/08/2017    BILITOT 0.5 03/08/2017       Assessment/Plan:   Blu Allen is a 61 y.o. male with signs and symptoms consistent with a recurrent epidermal inclusion cyst.  I have explained the pathophysiology of the cysts in detail as well as the surgical versus non-operative management strategies.      The risks of surgery were discussed in detail which include, but are not limited to, bleeding, infection, blood clots, stroke, heart attack and death.  Additionally, the risks of non operative management were discussed which include, but are not limited to,enlargin of the mass, infection and pain.      He understands everything which was discussed and has consented to proceed with an excision of the mass.  We will schedule surgery accordingly.       Rj Bañuelos D.O. [facility]  [phone]  [facility] Department of Surgery

## 2021-06-11 NOTE — PROGRESS NOTES
"ASSESSMENT/PLAN:       1. Skin lesion  -2 skin lesions consistent with actinic keratoses frozen today without difficulties.  He will follow-up as needed.  Given his history of these recur we will refer to dermatology for further evaluation and assessment.    2. Lump of skin of back  -Seems to be consistent with a recurrent epidermal inclusion cyst which is consistent with his history.  Given the recurrence I am referring to general surgery for evaluation and possible removal.  - Ambulatory referral to General Surgery    3. Type 2 diabetes mellitus  -Placing an A1c for him to have done at a future date given the addition of the new medication.  - Glycosylated Hemoglobin A1c; Future        Elvia Harding MD      PROGRESS NOTE   6/13/2017    SUBJECTIVE:  Blu Allen is a 61 y.o. male  who presents for a spot on his ear.     He has had this frozen before. It did seem like it went away but has come back. He has a spot on his scalp as well. He does want to have this looked at as well. He is not sure when he noticed this. He did have a basal cell carcinoma taken off his back last year.  He does wear sunscreen as well as a hat.    He does have a spot on the midback as well that seems to be a recurrence of a \"cyst\" that he had removed several years ago.     He has started an injectable medication as well for his diabetes. It was unaffordable so is now starting another. He is overall doing well. His labs were donein March. He knows that his sugars will be elevated.   Chief Complaint   Patient presents with     Follow-up     In March patient was seen for a lesion that was frozen on his left ear. Patient would like this area to be frozen again.          Patient Active Problem List   Diagnosis     Overweight     Essential Hypercholesterolemia     Hearing Loss     Benign Essential Hypertension     Fatigue     Type 2 diabetes mellitus     Back skin lesion     Basal cell carcinoma     Heartburn       Current " Outpatient Prescriptions   Medication Sig Dispense Refill     acetaminophen (TYLENOL) 500 MG tablet Take 500-1,000 mg by mouth every 6 (six) hours as needed for pain.       albiglutide (TANZEUM) 30 mg/0.5 mL PnIj Inject 30 mg under the skin every 7 days. 4 each 3     aspirin 81 MG EC tablet Take 81 mg by mouth every evening.        blood glucose meter (GLUCOMETER) Use 1 each As Directed as needed. Dispense glucometer brand per patient's insurance at pharmacy discretion. 1 each 0     blood glucose test strips Use 1 each As Directed daily as needed. Dispense brand per patient's insurance at pharmacy discretion. 100 each 3     fenofibrate (TRIGLIDE) 160 MG tablet TAKE 1 TABLET (160 MG TOTAL) BY MOUTH DAILY. 90 tablet 0     generic lancets Use 1 each As Directed daily as needed. Dispense brand per patient's insurance at pharmacy discretion. 100 each 3     glipiZIDE (GLUCOTROL) 10 MG 24 hr tablet Take 1 tablet (10 mg total) by mouth daily. 180 tablet 1     lisinopril-hydrochlorothiazide (PRINZIDE,ZESTORETIC) 10-12.5 mg per tablet Take 1 tablet by mouth daily. 90 tablet 1     metFORMIN (GLUCOPHAGE) 1000 MG tablet Take 1 tablet (1,000 mg total) by mouth 2 (two) times a day with meals. 180 tablet 1     omeprazole (PRILOSEC) 20 MG capsule Take 1 capsule (20 mg total) by mouth daily. 90 capsule 3     simvastatin (ZOCOR) 80 MG tablet Take 1 tablet (80 mg total) by mouth bedtime. 90 tablet 1     No current facility-administered medications for this visit.        History   Smoking Status     Never Smoker   Smokeless Tobacco     Not on file           OBJECTIVE:        No results found for this or any previous visit (from the past 240 hour(s)).    Vitals:    06/13/17 1636   BP: 124/70   Patient Site: Right Arm   Patient Position: Sitting   Cuff Size: Adult Large   Pulse: 62   Weight: 218 lb (98.9 kg)     Weight: 218 lb (98.9 kg)        Physical Exam:  GENERAL APPEARANCE: A&A, NAD, well hydrated, well nourished  SKIN:  Normal skin  turgor, small scaly lesion on his left pinna as well as one on his right scalp anteriorly, on his mid back to the left of his spine medial to the scapula there is a palpable mobile lump present without any signs of drainage or erythema  HEENT: moist mucous membranes, no rhinorrhea  NECK: Normal  CV: RRR, no M/G/R   LUNGS: CTAB  NEURO: no gross deficits

## 2021-06-11 NOTE — PROGRESS NOTES
"Cryotherapy, skin lesion  Date/Time: 6/13/2017 4:45 PM  Performed by: SYLVIA CAO  Authorized by: SYLVIA CAO   Consent: Verbal consent obtained.  Risks and benefits: risks, benefits and alternatives were discussed  Consent given by: patient  Patient understanding: patient states understanding of the procedure being performed  Patient consent: the patient's understanding of the procedure matches consent given  Procedure consent: procedure consent matches procedure scheduled  Site marked: the operative site was marked  Required items: required blood products, implants, devices, and special equipment available  Patient identity confirmed: verbally with patient  Time out: Immediately prior to procedure a \"time out\" was called to verify the correct patient, procedure, equipment, support staff and site/side marked as required.  Local anesthesia used: no    Anesthesia:  Local anesthesia used: no  Sedation:  Patient sedated: no    Patient tolerance: Patient tolerated the procedure well with no immediate complications        Procedure note:    Consent: Risks and benefits of therapy discussed with patient who voices understanding and agrees with planned care. No barriers to communication or understanding identified.  After obtaining informed consent, the patient's identity, procedure, and site were verified during a pause prior to proceeding with the minor surgical procedure as per universal protocol recommendations.  Actinic keratoses were treated with cryocautery with freeze thaw freeze technique with 2-3 mm surround freeze. Local care discussed. Side effects of treatment and precautions discussed. All questions answered. To follow up if worse or any new problems      "

## 2021-06-11 NOTE — PROGRESS NOTES
DIABETES EDUCATION CARE PLAN    Assessment/Plan:     Initial visit for diabetes education and counseling. Kevin is not testing his blood sugar regularly. Noted his A1c has been above goal since 2014. He did not start Trulicity due to cost. Provided a savings card to decrease co-pay to $25.00 per month. If the pharmacy at  does not honor savings cards he will request a new prescription to be sent to [facility].  Instructed on how to store, measure and inject Trulicity. Provided sample Trulicity pens to last one month.     Kevin and his wife are traveling to [place] tomorrow to meet their first Grandchild, Shawn. The plan is for him to start Trulicity when he returns to [place].    Current diabetes medications:   Metformin 1000 mg twice daily  Glipizide ER 10 mg once daily    Plan:  1. Test blood sugar twice per day (before meals and 2 hours after dinner).  2. Start 0.75 mg Trulicity once weekly injection on Tuesday, June 13th after returning from trip.  3. Continue Metformin and Glipizide at current doses.  4. Increase activity; walking 20-30 minutes most days of the week or 2.5 hours per week.  5. Bring meter and blood sugar to next visit in one month.     Subjective and Objective:     Blu Allen is a 61 y.o. male referred by Elvia Harding MD.  Accompanied by:  spouse Yolie Urbano does most of the cooking    Wt Readings from Last 3 Encounters:   06/02/17 221 lb (100.2 kg)   03/08/17 218 lb 6.4 oz (99.1 kg)   08/03/16 219 lb 5 oz (99.5 kg)     Lab Results   Component Value Date    HGBA1C 8.9 (H) 03/08/2017     Physical Activity: Sedentary job. Retiring from  in October. Belongs to [employer] but has not gone for years    Diet/Eating Habits: Eats 3 meals per day and snacks of fruit or Greek yogurt. Working on portion control and avoiding second helpings. Drinks a lot of water  Breakfast: English muffin with peanut butter. Black coffee  Lunch: Leftovers or sandwich on 12 Grain bread with  peanut butter  Dinner: Meat, potato or 1/2 cup Brown rice/Quinoa, vegetable  Snacks: Ice cream (1/2 cup)    SMBG pattern/BG ranges: Does not test BG    Hypoglycemia: none    Blood sugar goals: -130; Bedtime: 120-140    EDUCATION RECORD    Monitoring   Meter: Assessed and Discussed and Competent  Monitoring: Discussed and Literature provided  BG goals: Discussed and Literature provided    Nutrition Management  Nutrition Management: Discussed  Weight: Assessed and Discussed  Portions/Balance: Assessed and Discussed  Carb ID/Count: Not addressed  Label Reading: Not addressed  Heart Healthy Fats: Not addressed  Physical Activity: Assessed and Discussed  Oral diabetes medications: Assessed, Discussed and Literature provided  Injected Medications: Discussed   Storage/Exp:Discussed and Literature provided   Site Rotation: Discussed and Literature provided     Diabetes Disease Process: Discussed and Literature provided    Acute Complications: Prevent, Detect, Treat:  Hypoglycemia: Assessed and Discussed and Literature provided  Hyperglycemia: Assessed and Discussed and Literature provided  ABC: Discussed  Goal Setting and Problem Solving: Discussed and Literature provided  Barriers: Assessed  Psychosocial Adjustments: Assessed    Time spent with the patient: 60 minutes   Previous Education: no  Visit Type:Diabetes Self-Management Training ()  Diagnosis per referral:Type 2 Diabetes, uncontrolled E11.65    Carla Lanza, RD, LD, CDE  6/2/2017  2:58 PM

## 2021-06-11 NOTE — PROGRESS NOTES
Kevin came in today for a quick instruction on how to use Tanzeum once weekly injectable GLP-1 medication. His insurance would not cover Trulicity even with the savings card. Instructed on how to mix, measure and inject Tanzeum. Provided written information. Prescription sent to his work pharmacy at . Tanzeum is available in 2 strengths (30 mg and 50 mg).    Plan:  1. Test blood sugar twice daily (before breakfast and 2 hours after largest meal).  2. Start Tanzeum 30 mg once weekly injection. Pen needles are included in the kit.   3. Continue Metformin and Glipizide Extended Release at current doses.  4. Bring meter to next diabetes education visit 6/27/17 at 10:30 am.    No charge visit  Diagnosis Code E11.65

## 2021-06-11 NOTE — TELEPHONE ENCOUNTER
Refill Approved    Rx renewed per Medication Renewal Policy. Medication was last renewed on 8/30/19.    Allie Warner, [facility] Connection Triage/Med Refill 9/30/2020     Requested Prescriptions   Pending Prescriptions Disp Refills     simvastatin (ZOCOR) 80 MG tablet [Pharmacy Med Name: SIMVASTATIN 80 MG TABLET] 90 tablet 3     Sig: TAKE 1 TABLET BY MOUTH EVERYDAY AT BEDTIME       Statins Refill Protocol (Hmg CoA Reductase Inhibitors) Passed - 9/27/2020  1:30 PM        Passed - PCP or prescribing provider visit in past 12 months      Last office visit with prescriber/PCP: 6/13/2017 Elvia Harding MD OR same dept: 7/7/2020 Jacobo Correa MD OR same specialty: 7/7/2020 Jacobo Correa MD  Last physical: Visit date not found Last MTM visit: Visit date not found   Next visit within 3 mo: Visit date not found  Next physical within 3 mo: Visit date not found  Prescriber OR PCP: Elvia Harding MD  Last diagnosis associated with med order: 1. Essential Hypercholesterolemia  - simvastatin (ZOCOR) 80 MG tablet [Pharmacy Med Name: SIMVASTATIN 80 MG TABLET]; TAKE 1 TABLET BY MOUTH EVERYDAY AT BEDTIME  Dispense: 90 tablet; Refill: 3    If protocol passes may refill for 12 months if within 3 months of last provider visit (or a total of 15 months).

## 2021-06-11 NOTE — PROGRESS NOTES
DIABETES EDUCATION CARE PLAN    Assessment/Plan:     Follow-up visit for diabetes education and counseling. Tanzeum was started June 13th. Kevin is tolerating the Tanzeum without side effects. He noticed he gets full faster. Weight unchanged. Focused on lifestyle changes today. Instructed on carbohydrate counting and portion control. Kevin has not started exercising yet. He has a membership to [employer]. In the past he did cardio and lifted weights every other day. He plans to work harder on diet and exercise. Blood sugars continue above target. Most blood sugars are above 200. Increased the Tanzeum to 50 mg once weekly injection. He still has 2 weeks of the lower dose of Tanzeum to use up before increasing.    Current diabetes medications:   Glucophage 1000 mg twice daily   Glipizide Extended Release 10 mg once daily  Tanzeum 30 mg once weekly injection    Plan:  1. Test blood sugar 2 times per day (before breakfast and 2 hours after dinner).  2. Increase Tanzeum 50 mg once weekly injection.  3. Continue Metformin and Glipizide Extended Release at current doses.  4. Aim for foods with 3 grams of fiber or more.   5. Limit portions of carbohydrate to 60-75 grams of carbohydrate per meal; 15-30 gm carbohydrate per snack.  6. Increase activity; aim for 20-30 minutes of walking or go to [employer].   7. Bring meter and blood sugar log to next visit in 6 weeks.    Subjective and Objective:     Blu Allen is a 61 y.o. male referred by Elvia Harding MD.  Accompanied by:  unaccompanied    Wt Readings from Last 3 Encounters:   06/27/17 219 lb 12.8 oz (99.7 kg)   06/13/17 218 lb (98.9 kg)   06/13/17 218 lb (98.9 kg)     Lab Results   Component Value Date    HGBA1C 8.9 (H) 03/08/2017     Physical Activity: sedentary    SMBG pattern/BG ranges: Uses Relion Prime meter  Tests 2 times per day  Blood sugar log scanned    Hypoglycemia: none    Blood sugar goals: -130; Bedtime: 120-140    EDUCATION  RECORD    Monitoring   Meter: Assessed and Discussed and Competent  Monitoring: Discussed and Literature provided  BG goals: Discussed and Literature provided    Nutrition Management  Nutrition Management: Discussed  Weight: Assessed and Discussed  Portions/Balance: Assessed and Discussed  Carb ID/Count: Discussed and Literature provided  Label Reading: Discussed  Heart Healthy Fats: Discussed  Physical Activity: Assessed and Discussed  Oral diabetes medications: Discussed  Injected Medications: Discussed   Storage/Exp:Competent   Site Rotation: Competent     Diabetes Disease Process: Discussed and Literature provided    Acute Complications: Prevent, Detect, Treat:  Hypoglycemia: Assessed and Discussed and Literature provided  Hyperglycemia: Assessed and Discussed and Literature provided    Goal Setting and Problem Solving: Discussed and Literature provided  Barriers: Assessed  Psychosocial Adjustments: Assessed    Time spent with the patient: 60 minutes   Visit Type:Diabetes Self-Management Training ()  Diagnosis per referral:Type 2 Diabetes without complication (E11.9)    Carla Lanza RD, LD, CDE  6/27/2017  10:24 AM

## 2021-06-12 NOTE — PROGRESS NOTES
Blu Allen is status post excision of back cyst.  He is doing well with no complaints  EXAM:  /71 (Patient Site: Right Arm, Patient Position: Sitting, Cuff Size: Adult Regular)  Pulse 67  SpO2 94%  GENERAL: Well developed male, No acute distress, pleasant and conversant   Back-well-healed incision, no stigmata of infection    PATHOLOGY RESULTS:  Dermal inclusion cyst    ASSESSMENT AND PLAN:  Blu Allen is doing well postoperatively.  I have reviewed and discussed the pathology results with him.  He may continue with  activities as tolerated.  He may now follow up on a prn basis if he has any other questions or concerns.     Flaquito Bañuelos D.O., FACS  [phone]  [facility] Department of Surgery

## 2021-06-12 NOTE — PROGRESS NOTES
DIABETES CARE PLAN    Assessment/Plan:     Follow-up visit for diabetes education and counseling. At the last diabetes education visit Tanzeum was increased to 50 mg once per week injection. Kevin is tolerating the higher dose with minimal side effects. He noticed he gets full faster and he is eating less. Noted he lost 6 pounds in the past 2 weeks. Kevin is testing his blood sugar twice daily. The 30 day blood sugar average is 145. There are spikes 1-3 times per week above 200 due to food choices (Chinese buffet or dessert). Instructed on preventing diabetes complications (foot care, sick days, importance of eye and dental exams). Completed education topics.       Current Diabetes Medications:   Glucophage 1000 mg twice daily  Glipizide Extended Release 10 mg once daily in the morning  Tanzeum 50 mg once weekly injection    PLAN:   1. Continue to test blood sugar twice daily at different times.  2. Continue to work at limiting sweets and avoiding all you can eat buffets.  3. Continue current exercise routine.  4. Schedule a diabetes check with Dr. Harding. Bring blood sugar log to appointment.   5. Future visits with me as needed.    Subjective/Objective:     Blu Allen is a 61 y.o. male referred by Elvia Harding MD. Accompanied by:  unaccompanied    Wt Readings from Last 3 Encounters:   06/27/17 219 lb 12.8 oz (99.7 kg)   06/13/17 218 lb (98.9 kg)   06/13/17 218 lb (98.9 kg)     Activity: Going to [employer] (cardio and some weight training) and yard work    SMBG pattern/BG ranges: Uses Relion Prime meter  blood sugar log scanned    BG goals: Before meals ; 2 hours after meals: less 180; Bedtime: 120-140    Lab Results   Component Value Date    HGBA1C 8.9 (H) 03/08/2017     EDUCATION RECORD      Monitoring   Meter: Discussed  Monitoring: Discussed and Literature provided  BG goals: Discussed and Literature provided    Nutrition Management  Nutrition Management: Discussed and Literature  provided  Weight: Assessed and Discussed  Portions/Balance: Assessed and Discussed  Carb ID/Count: Competent  Label Reading: Competent  Heart Healthy Fats: Competent  Physical Activity: Discussed and Literature provided  Medications: Assessed, Discussed and Competent    Diabetes Disease Process: Competent    Acute Complications: Prevent, Detect, Treat:  Hypoglycemia: Discussed, Literature provided and Competent  Hyperglycemia: Discussed, Literature provided and Competent  Sick Days: Discussed, Literature provided and Competent. Receives an annual flu shot.    Chronic Complications  Foot Care:Discussed, Literature provided and Competent  Skin Care: Discussed, Literature provided and Competent  Eye: Discussed, Literature provided and Competent. Gets an annual eye exam  ABC: Discussed  Teeth:Discussed, Literature provided and Competent. Dental cleaning every 6 months  Goal Setting and Problem Solving: Discussed and Literature provided  Barriers: Assessed  Psychosocial Adjustments: Assessed    Time spent with the patient: 60 minutes   Visit Type:Diabetes Self-Management Training ()  Diagnosis per referral:Type 2 Diabetes, controlled without complications E11.9    Carla Lanza RD, LD, CDE  8/8/2017  4:06 PM

## 2021-06-12 NOTE — TELEPHONE ENCOUNTER
Refill Approved    Rx renewed per Medication Renewal Policy. Medication was last renewed on 8/30/19.    Allie Warner, [facility] Triage/Med Refill 11/4/2020     Requested Prescriptions   Pending Prescriptions Disp Refills     lisinopriL-hydrochlorothiazide (PRINZIDE,ZESTORETIC) 10-12.5 mg per tablet [Pharmacy Med Name: LISINOPRIL-HCTZ 10-12.5 MG TAB] 90 tablet 3     Sig: TAKE 1 TABLET BY MOUTH EVERY DAY       Diuretics/Combination Diuretics Refill Protocol  Passed - 11/1/2020  9:49 AM        Passed - Visit with PCP or prescribing provider visit in past 12 months     Last office visit with prescriber/PCP: 7/7/2020 Jacobo Correa MD OR same dept: 7/7/2020 Jacobo Correa MD OR same specialty: 7/7/2020 Jacobo Correa MD  Last physical: 8/7/2018 Last MTM visit: Visit date not found   Next visit within 3 mo: Visit date not found  Next physical within 3 mo: Visit date not found  Prescriber OR PCP: Jacobo Correa MD  Last diagnosis associated with med order: 1. Benign Essential Hypertension  - lisinopriL-hydrochlorothiazide (PRINZIDE,ZESTORETIC) 10-12.5 mg per tablet [Pharmacy Med Name: LISINOPRIL-HCTZ 10-12.5 MG TAB]; TAKE 1 TABLET BY MOUTH EVERY DAY  Dispense: 90 tablet; Refill: 3    If protocol passes may refill for 12 months if within 3 months of last provider visit (or a total of 15 months).             Passed - Serum Potassium in past 12 months      Lab Results   Component Value Date    Potassium 4.5 07/07/2020             Passed - Serum Sodium in past 12 months      Lab Results   Component Value Date    Sodium 141 07/07/2020             Passed - Blood pressure on file in past 12 months     BP Readings from Last 1 Encounters:   07/07/20 142/68             Passed - Serum Creatinine in past 12 months      Creatinine   Date Value Ref Range Status   07/07/2020 1.18 0.70 - 1.30 mg/dL Final

## 2021-06-12 NOTE — PROGRESS NOTES
DATE OF SERVICE: 09/11/2017    CHIEF COMPLAINT:  Recheck of diabetes, hyperlipidemia and GERD.  The patient notes  all of these have been stable.  He has been his checking blood sugar very faithful  and getting in the 120 to 140 range consistently on the Tanzeum.  He is currently  taking 50 mg weekly.  He has diabetic neuropathy.  He has had a recent ophthalmology  examination.  Socially he does not smoke and he works for [employer] and is getting ready to  retire.  He also has concerns about erectile dysfunction and would like help with  that.  He also has concerns about lesions on the left ear.  A photo of the lesion  was taken and is currently in the media tab under administrative document.    OBJECTIVE:  VITAL SIGNS:  The patient's temperature is afebrile, blood pressure 118/70, pulse  56, respirations 12.  HEENT:  Normal.  Small lesion on the left ear was noted as in the photo.    NECK:  Supple.    HEART:  Regular rate and rhythm.  LUNGS:  Clear.    ASSESSMENT:  1.  Diabetes with neuropathy.  2.  Gastroesophageal reflux disease.  3.  Hyperlipidemia.    PLAN:  1.  Continue Zocor.  2.  Continue Prilosec.  3.  Continue metformin.  4.  Continue glipizide.  5.  Continue Tanzeum, although would be quick to discontinue this and start Lantus  if not improving.  6.  Strongly encouraged and discussed weight loss techniques.  7.  We will check hemoglobin A1c today.  8.  Patient will follow up in 6 months and will recheck his left ear.      DILIP PRATT MD  ca  D 09/11/2017 08:49:16  T 09/11/2017 10:10:47  R 09/11/2017 10:10:47  [id number]        cc:SYLVIA PRATT MD

## 2021-06-12 NOTE — TELEPHONE ENCOUNTER
RN cannot approve Refill Request    RN can NOT refill this medication Protocol failed and NO refill given. Last office visit: 7/7/2020 Jacobo Correa MD Last Physical: 8/7/2018 Last MTM visit: Visit date not found Last visit same specialty: 7/7/2020 Jacobo Correa MD.  Next visit within 3 mo: Visit date not found  Next physical within 3 mo: Visit date not found      Allie Warner, [facility] Triage/Med Refill 10/29/2020    Requested Prescriptions   Pending Prescriptions Disp Refills     fenofibrate (TRIGLIDE) 160 MG tablet [Pharmacy Med Name: Fenofibrate Oral Tablet 160 MG] 90 tablet 0     Sig: TAKE ONE TABLET BY MOUTH ONE TIME DAILY       Fenofibrate Refill Protocol Failed - 10/28/2020  9:40 AM        Failed - Fasting lipid cascade in last 12 months     Cholesterol   Date Value Ref Range Status   07/09/2019 145 <=199 mg/dL Final     Triglycerides   Date Value Ref Range Status   07/09/2019 105 <=149 mg/dL Final     HDL Cholesterol   Date Value Ref Range Status   07/09/2019 56 >=40 mg/dL Final     LDL Calculated   Date Value Ref Range Status   07/09/2019 68 <=129 mg/dL Final     Patient Fasting > 8hrs?   Date Value Ref Range Status   07/09/2019 Yes  Final             Failed - AST or ALT in last 12 months     AST   Date Value Ref Range Status   03/08/2017 31 0 - 40 U/L Final     ALT   Date Value Ref Range Status   03/08/2017 43 0 - 45 U/L Final               Passed - Renal status in last 6 months     Creatinine   Date Value Ref Range Status   07/07/2020 1.18 0.70 - 1.30 mg/dL Final             Passed - PCP or prescribing provider visit in past 12 months       Last office visit with prescriber/PCP: 7/7/2020 Jacobo Correa MD OR same dept: 7/7/2020 Jacobo Correa MD OR same specialty: 7/7/2020 Jacobo Correa MD  Last physical: 8/7/2018 Last MTM visit: Visit date not found   Next visit within 3 mo: Visit date not found  Next physical within 3 mo: Visit date not found  Prescriber OR  PCP: Jacobo Correa MD  Last diagnosis associated with med order: 1. Essential Hypercholesterolemia  - fenofibrate (TRIGLIDE) 160 MG tablet [Pharmacy Med Name: Fenofibrate Oral Tablet 160 MG]; TAKE ONE TABLET BY MOUTH ONE TIME DAILY   Dispense: 90 tablet; Refill: 0    If protocol passes may refill for 12 months if within 3 months of last provider visit (or a total of 15 months).

## 2021-06-13 NOTE — PROGRESS NOTES
"Chief Complaint   Patient presents with     Follow-up     MRI results     HPI: Blu continues to have back pain but is improving.  He has been more active, doing more exercise of the last week, and feels that his back pain has subsided.  He still has mild pain radiating down the buttock to the leg but improved.  ROS: No bowel or bladder issues.  Socially he is getting ready to retire at the end of the week from [employer]  SH: The Patient's  reports that he has never smoked. He has never used smokeless tobacco. He reports that he drinks about 1.2 oz of alcohol per week  He reports that he does not use illicit drugs.  FH: The Patient's family history is not on file.    Meds:  Blu has a current medication list which includes the following prescription(s): acetaminophen, albiglutide, aspirin, blood glucose meter, blood glucose test, fenofibrate, generic lancets, glipizide, lisinopril-hydrochlorothiazide, metformin, omeprazole, sildenafil, and simvastatin.    O:  /70  Pulse 80  Ht 5' 8\" (1.727 m)  Wt 215 lb 2 oz (97.6 kg)  BMI 32.71 kg/m2  Patient no acute distress    I have personally visualized and reviewed this Xray and the findings are: MRI findings were reviewed particularly T2 imaging showing L4-5 disc bulging with impingement.      A/P:   1. Type 2 diabetes mellitus  Blood sugar stable.  Continue glipizide    2. Benign Essential Hypertension  Stable.  Continue lisinopril had chlorothiazide    3. Backache  Back pain improving.  We reviewed MRI films in detail and at this point he would like conservative management.  Offered physical therapy and he declined but will do exercises at home.  Strongly recommend weight loss and set a goal of 170.  He will follow-up as needed.                                            "

## 2021-06-13 NOTE — PROGRESS NOTES
"Chief Complaint   Patient presents with     Pain     R leg pain quad radiate to buttocks down behind knee off an on. Pain not too bad today.     HPI: Very pleasant 61-year-old gentleman presents today with new onset right-sided leg pain.  He noted that approximately 2 weeks ago he mowed the lawn and after that he had shooting pain in his right buttocks going down the back of his right leg down to his knee.  He strongly denies any trauma.  He states that he has been fighting a small feeling like this for quite some time but it was exacerbated 2 weeks ago.  ROS: No bowel or bladder difficulties.  No bruising.  No weakness in the lower extremities.  Social history and past history unchanged from 9 1/11/2017.    FH: The Patient's family history is not on file.    Meds:  Blu has a current medication list which includes the following prescription(s): acetaminophen, albiglutide, aspirin, blood glucose meter, blood glucose test, fenofibrate, generic lancets, glipizide, lisinopril-hydrochlorothiazide, metformin, omeprazole, sildenafil, and simvastatin.    O:  /70 (Patient Site: Left Arm, Patient Position: Sitting, Cuff Size: Adult Large)  Pulse 60  Resp 16  Ht 5' 8\" (1.727 m)  Wt 210 lb 3 oz (95.3 kg)  BMI 31.96 kg/m2  Examination shows the patient alert conversant no acute distress  Negative straight leg raise  Normal distal neurological vascular function in the right lower extremity.  Normal flexion-extension.  Pain to palpation over the right buttock area  Neuro- normal muscle strength in the lower extremities bilaterally.    A/P:   1. Back pain-new problem  -Patient retires from  in 2 weeks and will be busy trying to get all loose ends tied up  -Recommended light activity  -Discussed physical therapy will hold for now but consider this as an option  -Ibuprofen as needed  - MR Lumbar Spine Without Contrast; Future    2.  Hypertension-stable    3.  Diabetes-stable                                        "

## 2021-06-14 NOTE — TELEPHONE ENCOUNTER
RN cannot approve Refill Request    RN can NOT refill this medication PCP messaged that patient is overdue for Labs. Last office visit: 7/7/2020 Jacobo Correa MD Last Physical: 8/7/2018 Last MTM visit: Visit date not found Last visit same specialty: 7/7/2020 Jacobo Correa MD.  Next visit within 3 mo: Visit date not found  Next physical within 3 mo: Visit date not found      Latesha hPelps, [facility] Triage/Med Refill 1/3/2021    Requested Prescriptions   Pending Prescriptions Disp Refills     metFORMIN (GLUCOPHAGE) 1000 MG tablet [Pharmacy Med Name: METFORMIN HCL 1,000 MG TABLET] 180 tablet 1     Sig: TAKE 1 TABLET BY MOUTH TWICE A DAY WITH MEALS       Metformin Refill Protocol Failed - 1/2/2021  9:38 AM        Failed - LFT or AST or ALT in last 12 months     Albumin   Date Value Ref Range Status   03/08/2017 4.0 3.5 - 5.0 g/dL Final     Bilirubin, Total   Date Value Ref Range Status   03/08/2017 0.5 0.0 - 1.0 mg/dL Final     Bilirubin, Direct   Date Value Ref Range Status   04/19/2013 0.2 <0.6 mg/dL Final     Alkaline Phosphatase   Date Value Ref Range Status   03/08/2017 50 45 - 120 U/L Final     AST   Date Value Ref Range Status   03/08/2017 31 0 - 40 U/L Final     ALT   Date Value Ref Range Status   03/08/2017 43 0 - 45 U/L Final     Protein, Total   Date Value Ref Range Status   03/08/2017 7.2 6.0 - 8.0 g/dL Final                Failed - Microalbumin in last year      Microalbumin, Random Urine   Date Value Ref Range Status   07/15/2015 0.90 0.00 - 1.99 mg/dL Final                  Passed - Blood pressure in last 12 months     BP Readings from Last 1 Encounters:   07/07/20 142/68             Passed - GFR or Serum Creatinine in last 6 months     GFR MDRD Non Af Amer   Date Value Ref Range Status   07/07/2020 >60 >60 mL/min/1.73m2 Final     GFR MDRD Af Amer   Date Value Ref Range Status   07/07/2020 >60 >60 mL/min/1.73m2 Final             Passed - Visit with PCP or prescribing provider  visit in last 6 months or next 3 months     Last office visit with prescriber/PCP: 7/7/2020 OR same dept: 7/7/2020 Jacobo Correa MD OR same specialty: 7/7/2020 Jacobo Correa MD Last physical: Visit date not found Last MTM visit: Visit date not found         Next appt within 3 mo: Visit date not found  Next physical within 3 mo: Visit date not found  Prescriber OR PCP: Jacobo Correa MD  Last diagnosis associated with med order: 1. DM (diabetes mellitus) (H)  - metFORMIN (GLUCOPHAGE) 1000 MG tablet [Pharmacy Med Name: METFORMIN HCL 1,000 MG TABLET]; TAKE 1 TABLET BY MOUTH TWICE A DAY WITH MEALS  Dispense: 180 tablet; Refill: 1     If protocol passes may refill for 12 months if within 3 months of last provider visit (or a total of 15 months).           Passed - A1C in last 6 months     Hemoglobin A1c   Date Value Ref Range Status   07/07/2020 6.2 (H) 3.5 - 6.0 % Final

## 2021-06-14 NOTE — PROGRESS NOTES
S:  Patient presents for evaluation of his chronic medical issues.  His diabetes remains stable although he notes he has consumed more Danny cookies recently and he predicts that his A1c will be elevated.    He continues to follow-up with [facility] concerning his prostate cancer and his PSAs are monitored by urology.    He continues to rosas erectile dysfunction and the sildenafil is minimally effective.    His diabetes remains stable and he has no neuropathy and his eye exam is up-to-date.    His hyperlipidemia remained stable on statin and Triglide    Medications: Aspirin, fenofibrate, lisinopril, hydrochlorothiazide, Metformin, omeprazole, sildenafil and simvastatin.    O:   Blood pressure 135 or 65, pulse 92, respiration 16, O2 sat 90% on room air  Constitutional:    --Vitals as above  --No acute distress  Eyes-  --Sclera noninjected  --Lids and conjunctiva normal  ENT-  --TMs clear  --Sclera noninjected  --Pharynx not erythematous  Neck-  --Neck supple with no cervical lymphadenopathy  Lungs-  --Clear to Auscultation  Heart-  --Regular rate and rhythm  Abdomen--  --Soft, non-tender, non-distended  Skin-  --Pink and dry  Psych-  --Alert and oriented  --Normal affect      A:   Diabetes  Prostate cancer  Erectile dysfunction  Hyperlipidemia  Over nourishment    P:   Encouraged exercise and weight loss  Update immunizations  Continue statin and Triglide  Continue Revatio if possible.  Suspect erectile dysfunction will not improve much given the fact it is over 2 years since he had prostate cancer  Continue follow-up with urology  Continue Metformin and check A1c today  CBC BMP lipid A1c  RTC 6-month

## 2021-06-15 PROBLEM — R12 HEARTBURN: Status: ACTIVE | Noted: 2017-03-08

## 2021-06-15 NOTE — TELEPHONE ENCOUNTER
RN cannot approve Refill Request    RN can NOT refill this medication PCP messaged that patient is overdue for Labs. Last office visit: 1/5/2021 Jacobo Correa MD Last Physical: 8/7/2018 Last MTM visit: Visit date not found Last visit same specialty: 1/5/2021 Jacobo Correa MD.  Next visit within 3 mo: Visit date not found  Next physical within 3 mo: Visit date not found      Kate Jimenez, [facility] Triage/Med Refill 2/23/2021    Requested Prescriptions   Pending Prescriptions Disp Refills     fenofibrate (TRIGLIDE) 160 MG tablet [Pharmacy Med Name: Fenofibrate Oral Tablet 160 MG] 90 tablet 0     Sig: TAKE ONE TABLET BY MOUTH ONE TIME DAILY       Fenofibrate Refill Protocol Failed - 2/23/2021 10:14 AM        Failed - AST or ALT in last 12 months     AST   Date Value Ref Range Status   03/08/2017 31 0 - 40 U/L Final     ALT   Date Value Ref Range Status   03/08/2017 43 0 - 45 U/L Final               Passed - Renal status in last 6 months     Creatinine   Date Value Ref Range Status   01/05/2021 1.40 (H) 0.70 - 1.30 mg/dL Final             Passed - Fasting lipid cascade in last 12 months     Cholesterol   Date Value Ref Range Status   01/05/2021 163 <=199 mg/dL Final     Triglycerides   Date Value Ref Range Status   01/05/2021 111 <=149 mg/dL Final     HDL Cholesterol   Date Value Ref Range Status   01/05/2021 66 >=40 mg/dL Final     LDL Calculated   Date Value Ref Range Status   01/05/2021 75 <=129 mg/dL Final     Patient Fasting > 8hrs?   Date Value Ref Range Status   01/05/2021 Yes  Final             Passed - PCP or prescribing provider visit in past 12 months       Last office visit with prescriber/PCP: 1/5/2021 Jacobo Correa MD OR same dept: 1/5/2021 Jacobo Correa MD OR same specialty: 1/5/2021 Jacobo Correa MD  Last physical: 8/7/2018 Last MTM visit: Visit date not found   Next visit within 3 mo: Visit date not found  Next physical within 3 mo: Visit date not  found  Prescriber OR PCP: Jacobo Correa MD  Last diagnosis associated with med order: 1. Essential Hypercholesterolemia  - fenofibrate (TRIGLIDE) 160 MG tablet [Pharmacy Med Name: Fenofibrate Oral Tablet 160 MG]; TAKE ONE TABLET BY MOUTH ONE TIME DAILY   Dispense: 90 tablet; Refill: 0    If protocol passes may refill for 12 months if within 3 months of last provider visit (or a total of 15 months).

## 2021-06-16 PROBLEM — N52.9 ED (ERECTILE DYSFUNCTION): Status: ACTIVE | Noted: 2018-04-17

## 2021-06-16 NOTE — PROGRESS NOTES
ASSESSMENT/PLAN:       1. Essential Hypercholesterolemia  -He is tolerating the simvastatin without any issues.  Updating lab work today and renewing medication today.  Follow-up in 6 months if things are going well.  - simvastatin (ZOCOR) 80 MG tablet; Take 1 tablet (80 mg total) by mouth bedtime.  Dispense: 90 tablet; Refill: 1  - Lipid Cascade    2. Type 2 diabetes mellitus  -He does not have any blood sugar numbers currently.  Will update prescription for glucometer to see if this is covered well otherwise he will  an over-the-counter glucometer as his is 11 years old.  Continue to work on diet and exercise, updating A1c today and follow-up in 6 months if normal, for if still rising or stable.  - glipiZIDE (GLUCOTROL) 10 MG 24 hr tablet; Take 1 tablet (10 mg total) by mouth daily.  Dispense: 180 tablet; Refill: 1  - metFORMIN (GLUCOPHAGE) 1000 MG tablet; Take 1 tablet (1,000 mg total) by mouth 2 (two) times a day with meals.  Dispense: 180 tablet; Refill: 1  - Glycosylated Hemoglobin A1c    3. Benign Essential Hypertension  -Blood pressure is good today.  Continue on current medications, updating lab work today and follow-up in 6 months.  Continue to work on diet and exercise.  - lisinopril-hydrochlorothiazide (PRINZIDE,ZESTORETIC) 10-12.5 mg per tablet; Take 1 tablet by mouth daily.  Dispense: 90 tablet; Refill: 1  - Comprehensive Metabolic Panel    4. Heartburn  -Patient is having worsening heartburn.  Will update some lab work as listed below, have him start a daily omeprazole and if this does not help alleviate his symptoms consider EGD.  He will let me know if things are not improving.  - omeprazole (PRILOSEC) 20 MG capsule; Take 1 capsule (20 mg total) by mouth daily.  Dispense: 90 capsule; Refill: 3  - HM2(CBC w/o Differential)  - H. pylori Antibody, IgG    5. Skin lesion of left ear  -Lesion frozen on his left pinna today.  He has a history of a basal cell cancer on his back so this obviously was  concerning for him.  It appeared to be a fairly typical actinic keratosis.  He will let me know if this does not resolve and we can refer to dermatology for further evaluation and assessment.  - Cryotherapy, skin lesion        Elvia Harding MD      PROGRESS NOTE   3/8/2017    SUBJECTIVE:  Blu Allen is a 61 y.o. male  who presents for a diabetic check.     He is doing well. He has a spot on his left ear that has been there a while. It is on the pinna.  Given his history of basal cell cancer on his back is obviously concerned him.  He has been using lotion and that does not help it go away but does seem to help make it a little bit less obvious.    He is getting minimal exercise. He is planning on retiring in October. He is hoping that it will get better then. He doesn't check his sugars often. He has not been eating smoothies anymore, hoping that will make his A1c better. He does look at his feet daily, does still have some tingling at times, and a feeling that there is something in his shoe. Not gone.  He did  the Januvia in August when I prescribed it however it caused quite a bit of money so he did not continue taking it.  His last eye visit year ago, he knows he is due.    He has had no chest pains, issues breathing. His medications are treating him ok.     He has still quite a bit of heartburn. He is using ranitidine with tums. It does help some. Now it seems like everything is making things worse where previously it was just the typical foods, spicy foods and tomato-based foods.  He does continue to eat those though.  No obvious blood in his stool.  Chief Complaint   Patient presents with     Diabetes     Patient is here today for diabetic care with fasting lab work.          Patient Active Problem List   Diagnosis     Overweight     Essential Hypercholesterolemia     Hearing Loss     Benign Essential Hypertension     Fatigue     Type 2 Diabetes Mellitus     Back skin lesion     Basal  cell carcinoma     Heartburn       Current Outpatient Prescriptions   Medication Sig Dispense Refill     acetaminophen (TYLENOL) 500 MG tablet Take 500-1,000 mg by mouth every 6 (six) hours as needed for pain.       aspirin 81 MG EC tablet Take 81 mg by mouth every evening.        fenofibrate (TRIGLIDE) 160 MG tablet TAKE 1 TABLET (160 MG TOTAL) BY MOUTH DAILY. 90 tablet 0     glipiZIDE (GLUCOTROL) 10 MG 24 hr tablet Take 1 tablet (10 mg total) by mouth daily. 180 tablet 1     lisinopril-hydrochlorothiazide (PRINZIDE,ZESTORETIC) 10-12.5 mg per tablet Take 1 tablet by mouth daily. 90 tablet 1     metFORMIN (GLUCOPHAGE) 1000 MG tablet Take 1 tablet (1,000 mg total) by mouth 2 (two) times a day with meals. 180 tablet 1     simvastatin (ZOCOR) 80 MG tablet Take 1 tablet (80 mg total) by mouth bedtime. 90 tablet 1     omeprazole (PRILOSEC) 20 MG capsule Take 1 capsule (20 mg total) by mouth daily. 90 capsule 3     No current facility-administered medications for this visit.        History   Smoking Status     Never Smoker   Smokeless Tobacco     Not on file           OBJECTIVE:        No results found for this or any previous visit (from the past 240 hour(s)).    Vitals:    03/08/17 0655   BP: 130/72   Patient Site: Right Arm   Patient Position: Sitting   Cuff Size: Adult Regular   Pulse: 60   Weight: 218 lb 6.4 oz (99.1 kg)     Weight: 218 lb 6.4 oz (99.1 kg)        Physical Exam:  GENERAL APPEARANCE: A&A, NAD, well hydrated, well nourished  SKIN:  Normal skin turgor, small area of scaling on his left pinna without any erythema.  HEENT: moist mucous membranes, no rhinorrhea  NECK: Normal without lymphadenopathy  CV: RRR, no M/G/R   LUNGS: CTAB  ABDOMEN: S&NT, no masses, palpable liver edge  EXTREMITY: no edema   NEURO: no gross deficits      Finasteride Counseling:  I discussed with the patient the risks of use of finasteride including but not limited to decreased libido, decreased ejaculate volume, gynecomastia, and depression. Women should not handle medication.  All of the patient's questions and concerns were addressed. Finasteride Male Counseling: Finasteride Counseling:  I discussed with the patient the risks of use of finasteride including but not limited to decreased libido, decreased ejaculate volume, gynecomastia, and depression. Women should not handle medication.  All of the patient's questions and concerns were addressed.

## 2021-06-17 NOTE — TELEPHONE ENCOUNTER
RN cannot approve Refill Request    RN can NOT refill this medication PCP messaged that patient is overdue for Labs. Last office visit: Visit date not found Last Physical: Visit date not found Last MTM visit: Visit date not found Last visit same specialty: 1/5/2021 Jacobo Correa MD.  Next visit within 3 mo: Visit date not found  Next physical within 3 mo: Visit date not found      Rach DEWITT Nohemi, [facility] Triage/Med Refill 5/19/2021    Requested Prescriptions   Pending Prescriptions Disp Refills     fenofibrate (TRIGLIDE) 160 MG tablet [Pharmacy Med Name: Fenofibrate Oral Tablet 160 MG] 90 tablet 0     Sig: TAKE ONE TABLET BY MOUTH ONE TIME DAILY       Fenofibrate Refill Protocol Failed - 5/18/2021  5:25 PM        Failed - AST or ALT in last 12 months     AST   Date Value Ref Range Status   03/08/2017 31 0 - 40 U/L Final     ALT   Date Value Ref Range Status   03/08/2017 43 0 - 45 U/L Final               Passed - Renal status in last 6 months     Creatinine   Date Value Ref Range Status   01/05/2021 1.40 (H) 0.70 - 1.30 mg/dL Final             Passed - Fasting lipid cascade in last 12 months     Cholesterol   Date Value Ref Range Status   01/05/2021 163 <=199 mg/dL Final     Triglycerides   Date Value Ref Range Status   01/05/2021 111 <=149 mg/dL Final     HDL Cholesterol   Date Value Ref Range Status   01/05/2021 66 >=40 mg/dL Final     LDL Calculated   Date Value Ref Range Status   01/05/2021 75 <=129 mg/dL Final     Patient Fasting > 8hrs?   Date Value Ref Range Status   01/05/2021 Yes  Final             Passed - PCP or prescribing provider visit in past 12 months       Last office visit with prescriber/PCP: Visit date not found OR same dept: 1/5/2021 Jacobo Correa MD OR same specialty: 1/5/2021 Jacobo Correa MD  Last physical: Visit date not found Last MTM visit: Visit date not found   Next visit within 3 mo: Visit date not found  Next physical within 3 mo: Visit date not  found  Prescriber OR PCP: Janak Hurley MD  Last diagnosis associated with med order: 1. Essential Hypercholesterolemia  - fenofibrate (TRIGLIDE) 160 MG tablet [Pharmacy Med Name: Fenofibrate Oral Tablet 160 MG]; TAKE ONE TABLET BY MOUTH ONE TIME DAILY   Dispense: 90 tablet; Refill: 0    If protocol passes may refill for 12 months if within 3 months of last provider visit (or a total of 15 months).

## 2021-06-18 NOTE — PROGRESS NOTES
DATE OF SERVICE: 06/12/2018    SUBJECTIVE:  Very pleasant 62-year-old old male presents for annual physical exam.   His blood sugars are running well but he actually had a couple episodes of low blood  sugar, including one of 48.  He continues on glipizide and metformin.  He is also  taking simvastatin but no side effects.  His hyperlipidemia is stable.  His  hypertension also remains stable on lisinopril, hydrochlorothiazide.    He is exercising more and has recently retired, so he has been able to lose weight  and is now down to 200, which is excellent.    Socially he does not smoke.  He is retired as noted above.    OBJECTIVE:  VITAL SIGNS:  110/60, pulse 56, respirations 16.  He is alert, conversant, in no  acute distress.  HEART:  Regular rate and rhythm.  Normal S1, S2.  LUNGS:  Clear to auscultation.  ABDOMEN:  Soft, nontender, no masses.    ASSESSMENT:  1.  Diabetes.  2.  Hypertension.  3.  Over-nourishment.  4.  Gastroesophageal reflux disease-improved.  5.  Erectile dysfunction.    PLAN:  1.  Discontinue glipizide.  2.  Change metformin to 500 b.i.d.  3.  Recheck A1c today.  4.  Continue Zocor.  5.  Continue lisinopril and hydrochlorothiazide.  6.  Continue baby aspirin.  7.  Recommended yearly eye exams and they are up to date.  8.  Shingles vaccine updated.  9.  Colonoscopy ordered.  10.  Anticipatory guidance given.  11.  Will see back in 6 months.    Addendum: Patient's PSA elevated.  Discussed on the phone with him in detail.  Referral to urology made.  Patient understands the urgency of following up with urology.    MD lyle WILKS 06/12/2018 08:46:12  T 06/12/2018 09:41:23  R 06/12/2018 10:03:17  [id number]        cc: SYLVIA PRATT MD

## 2021-06-19 NOTE — PROGRESS NOTES
Preoperative Exam    Very pleasant 62-year-old male with a history of diabetes, hypertension and hyperlipidemia he is scheduled for prostatectomy at [facility] on August 28, 2018.  He had an elevated PSA level of 9.7, positive biopsies with a Stanley score of 7 and negative PET scan for metastasis.  Past surgeries include pilonidal cyst removal and sinus excision with no complications.  He has no history of obstructive sleep apnea.  His diabetes remains stable on oral medications.        Addendum: Blood sugars were mildly elevated on lab results (see chart review).  Recommend hospitalist consultation postoperatively to closely monitor blood sugars and institute sliding scale while inpatient status.  His last A1c was 5.6 in June 2018.    Scheduled Procedure: prostate  Surgery Date:  8/28  Surgery Location: [facility], fax [phone]    Surgeon:  Dr. Phillips    Assessment/Plan:     1. DM (diabetes mellitus) (H)  -Last A1c was 5.7 and currently stable.  - HM2(CBC w/o Differential)  - Basic Metabolic Panel  - metFORMIN (GLUCOPHAGE) 500 MG tablet; Take 1 tablet (500 mg total) by mouth 2 (two) times a day with meals.  Dispense: 180 tablet; Refill: 1    2. Benign Essential Hypertension  -Stable on lisinopril and Hydrocort thiazide    3. Essential Hypercholesterolemia  -Stable on Triglide and Zocor    4. Overweight  -Outpatient management    Surgical Procedure Risk: Low (reported cardiac risk generally < 1%)  Have you had prior anesthesia?: Yes  Have you or any family members had a previous anesthesia reaction:  No  Do you or any family members have a history of a clotting or bleeding disorder?: No  Cardiac Risk Assessment: no increased risk for major cardiac complications    Patient's been optimized for surgery.  Low risk due to the ability to perform greater than 4 metastases and no history of chest pain or shortness of breath.        Functional Status: Independent  Patient plans to recover at home  with family.     Subjective:      Blu Allen is a 62 y.o. male who presents for a preoperative consultation.      All other systems reviewed and are negative, other than those listed in the HPI.    Pertinent History  Do you have difficulty breathing or chest pain after walking up a flight of stairs: No  History of obstructive sleep apnea: No  Steroid use in the last 6 months: No  Frequent Aspirin/NSAID use: Yes: 81mg aspirin  Prior Blood Transfusion: No  Prior Blood Transfusion Reaction: No  If for some reason prior to, during or after the procedure, if it is medically indicated, would you be willing to have a blood transfusion?:  There is no transfusion refusal.    Current Outpatient Prescriptions   Medication Sig Dispense Refill     acetaminophen (TYLENOL) 500 MG tablet Take 500-1,000 mg by mouth every 6 (six) hours as needed for pain.       aspirin 81 MG EC tablet Take 81 mg by mouth every evening.        blood glucose meter (GLUCOMETER) Use 1 each As Directed as needed. Dispense glucometer brand per patient's insurance at pharmacy discretion. 1 each 0     blood glucose test strips Use 1 each As Directed daily as needed. Dispense brand per patient's insurance at pharmacy discretion. 100 each 3     fenofibrate (TRIGLIDE) 160 MG tablet Take 1 tablet (160 mg total) by mouth daily. 90 tablet 1     generic lancets Use 1 each As Directed daily as needed. Dispense brand per patient's insurance at pharmacy discretion. 100 each 3     glipiZIDE (GLUCOTROL XL) 10 MG 24 hr tablet Take 1 tablet (10 mg total) by mouth 2 (two) times a day. 180 tablet 1     lisinopril-hydrochlorothiazide (PRINZIDE,ZESTORETIC) 10-12.5 mg per tablet TAKE 1 TABLET BY MOUTH DAILY. 90 tablet 0     lisinopril-hydrochlorothiazide (PRINZIDE,ZESTORETIC) 10-12.5 mg per tablet Take 1 tablet by mouth daily. 90 tablet 0     metFORMIN (GLUCOPHAGE) 1000 MG tablet TAKE 1 TABLET (1,000 MG TOTAL) BY MOUTH 2 (TWO) TIMES A DAY WITH MEALS. 180 tablet 0      omeprazole (PRILOSEC) 20 MG capsule Take 1 capsule (20 mg total) by mouth daily. 90 capsule 3     sildenafil (VIAGRA) 100 MG tablet Take 1 tablet (100 mg total) by mouth as needed for erectile dysfunction. 30 tablet 1     sildenafil, antihypertensive, (REVATIO) 20 mg tablet Take 1-5 tablets ( mg total) by mouth daily as needed. 20 tablet 1     simvastatin (ZOCOR) 80 MG tablet TAKE 1 TABLET (80 MG TOTAL) BY MOUTH BEDTIME. 90 tablet 1     TANZEUM 50 mg/0.5 mL PnIj INJECT 50 MG UNDER THE SKIN EVERY 7 DAYS. (Patient not taking: Reported on 6/12/2018) 4 each 3     No current facility-administered medications for this visit.         No Known Allergies    Patient Active Problem List   Diagnosis     Overweight     Essential Hypercholesterolemia     Hearing Loss     Benign Essential Hypertension     Fatigue     Type 2 diabetes mellitus (H)     Back skin lesion     Basal cell carcinoma     Heartburn     ED (erectile dysfunction)       Past Medical History:   Diagnosis Date     Cancer (H) 04/2017    BCC on back     Diabetes mellitus (H)      GERD (gastroesophageal reflux disease)      High cholesterol      Hypertension        Past Surgical History:   Procedure Laterality Date     GANGLION CYST EXCISION       PILONIDAL CYST / SINUS EXCISION       vastectomy     ROS: All 12 systems reviewed and are notable for hypertension, hyperlipidemia and over nourishment and diabetes.    Social History     Social History     Marital status:      Spouse name: N/A     Number of children: N/A     Years of education: N/A     Occupational History     Not on file.     Social History Main Topics     Smoking status: Never Smoker     Smokeless tobacco: Never Used     Alcohol use 1.2 oz/week     2 Cans of beer per week     Drug use: No     Sexual activity: Yes     Partners: Female     Other Topics Concern     Not on file     Social History Narrative       Patient Care Team:  Jacobo Correa MD as PCP - General (Family  Medicine)          Objective:     There were no vitals filed for this visit.      Physical Exam:  Constitutional:    --Vitals as above  --No acute distress  Eyes-  --Sclera noninjected  --Lids and conjunctiva normal  ENT-  --TMs clear  --Sclera noninjected  --Pharynx not erythematous  Neck-  --Neck supple with no cervical lymphadenopathy  Lungs-  --Clear to Auscultation  Heart-  --Regular rate and rhythm  Abdomen--  --Soft, non-tender, non-distended  Skin-  --Pink and dry  Psych-  --Alert and oriented  --Normal affect      There are no Patient Instructions on file for this visit.    EKG from 11 March 2018 shows no evidence of acute ST or T-wave changes    Labs:  Labs pending at this time.  Results will be reviewed when available.    Immunization History   Administered Date(s) Administered     Influenza O3x4-86, 02/02/2010     Pneumo Polysac 23-V 08/29/2008     Td, adult adsorbed, PF 03/02/2005     Td,adult,historic,unspecified 08/29/2008     Tdap 08/29/2008, 07/15/2016     ZOSTER, RECOMBINANT, IM 04/20/2018     Thank you for the opportunity to participate in the care for this patient.  If you have any concerns please do not hesitate to contact me at the [facility] at [phone].        Electronically signed by Jacobo Correa MD 08/07/18 9:59 AM

## 2021-06-19 NOTE — LETTER
Letter by Jacobo Correa MD at      Author: Jacobo Correa MD Service: -- Author Type: --    Filed:  Encounter Date: 7/9/2019 Status: (Other)         Blu Allen  [address] [facility] [address]            July 9, 2019        Dear Mr. Allen,        Below are the results from your recent visit:    Resulted Orders   HM2(CBC w/o Differential)   Result Value Ref Range    WBC 6.1 4.0 - 11.0 thou/uL    RBC 4.47 4.40 - 6.20 mill/uL    Hemoglobin 14.6 14.0 - 18.0 g/dL    Hematocrit 41.7 40.0 - 54.0 %    MCV 93 80 - 100 fL    MCH 32.6 27.0 - 34.0 pg    MCHC 34.9 32.0 - 36.0 g/dL    RDW 11.8 11.0 - 14.5 %    Platelets 314 140 - 440 thou/uL    MPV 7.8 7.0 - 10.0 fL   Basic Metabolic Panel   Result Value Ref Range    Sodium 140 136 - 145 mmol/L    Potassium 4.5 3.5 - 5.0 mmol/L    Chloride 104 98 - 107 mmol/L    CO2 24 22 - 31 mmol/L    Anion Gap, Calculation 12 5 - 18 mmol/L    Glucose 193 (H) 70 - 125 mg/dL    Calcium 10.2 8.5 - 10.5 mg/dL    BUN 16 8 - 22 mg/dL    Creatinine 1.31 (H) 0.70 - 1.30 mg/dL    GFR MDRD Af Amer >60 >60 mL/min/1.73m2    GFR MDRD Non Af Amer 55 (L) >60 mL/min/1.73m2    Narrative    Fasting Glucose reference range is 70-99 mg/dL per  American Diabetes Association (ADA) guidelines.   Glycosylated Hemoglobin A1c   Result Value Ref Range    Hemoglobin A1c 7.6 (H) 3.5 - 6.0 %   Lipid Cascade   Result Value Ref Range    Cholesterol 145 <=199 mg/dL    Triglycerides 105 <=149 mg/dL    HDL Cholesterol 56 >=40 mg/dL    LDL Calculated 68 <=129 mg/dL    Patient Fasting > 8hrs? Yes          Blu, your labs look good.  Your A1c is come down nicely.  Keep up the good work.  Continue the exercise, trying to lose weight, and we should visit again in 6 months.    Sincerely,        SANIA Correa MD, KEYLA  [facility]  [phone]

## 2021-06-20 NOTE — LETTER
Letter by Jacobo Correa MD at      Author: Jacobo Correa MD Service: -- Author Type: --    Filed:  Encounter Date: 7/8/2020 Status: (Other)         Blu Allen  [address] [facility] [address]            July 8, 2020        Dear Mr. Allen,        Below are the results from your recent visit:    Resulted Orders   Glycosylated Hemoglobin A1c   Result Value Ref Range    Hemoglobin A1c 6.2 (H) 3.5 - 6.0 %   Basic Metabolic Panel   Result Value Ref Range    Sodium 141 136 - 145 mmol/L    Potassium 4.5 3.5 - 5.0 mmol/L    Chloride 105 98 - 107 mmol/L    CO2 23 22 - 31 mmol/L    Anion Gap, Calculation 13 5 - 18 mmol/L    Glucose 120 70 - 125 mg/dL    Calcium 10.0 8.5 - 10.5 mg/dL    BUN 18 8 - 22 mg/dL    Creatinine 1.18 0.70 - 1.30 mg/dL    GFR MDRD Af Amer >60 >60 mL/min/1.73m2    GFR MDRD Non Af Amer >60 >60 mL/min/1.73m2    Narrative    Fasting Glucose reference range is 70-99 mg/dL per  American Diabetes Association (ADA) guidelines.             Blu, your labs look great.  Your A1c is dropping dramatically which is a great sign.  Keep up the good work.  We should visit again in 6 months.    Sincerely,        SANIA Correa MD, KEYLA  [facility]  [phone]

## 2021-06-20 NOTE — ANESTHESIA PREPROCEDURE EVALUATION
Anesthesia Evaluation      Patient summary reviewed   No history of anesthetic complications     Airway   Mallampati: III  Neck ROM: full   Pulmonary - negative ROS and normal exam                          Cardiovascular - normal exam  Exercise tolerance: > or = 4 METS  (+) hypertension, , hypercholesterolemia,      Neuro/Psych    (-) no seizures, no CVA    Comments: Hearing loss  Fatigue    Endo/Other    (+) diabetes mellitus (A1C 5.6) type 2, obesity (BMI 31),      GI/Hepatic/Renal    (+) GERD (Resolved per patient.),   chronic renal disease (Renal insufficiency, Cr 1.50),      Other findings:         Overweight    Essential Hypercholesterolemia    Hearing Loss    Benign Essential Hypertension    Fatigue    Type 2 diabetes mellitus (H)    Back skin lesion    Basal cell carcinoma    Heartburn    ED (erectile dysfunction)     Cancer (H) 04/2017    BCC on back    Diabetes mellitus (H)      GERD (gastroesophageal reflux disease)      High cholesterol      Hypertension       K 4.9, Cr 1.39, Hgb 14.6, Plts 357K          Dental - normal exam                        Anesthesia Plan  Planned anesthetic: general endotracheal  0.9%NaCl IVF  Regular insulin 4 units SQ preop for   Glidescope intubation  Modified RSI with zemuron  Zofran/Decadron 4 mg IV  Sugammadex reversal  ASA 3   Induction: intravenous   Anesthetic plan and risks discussed with: patient, spouse and child/children  Anesthesia plan special considerations: video-assisted, rapid sequence induction, antiemetics,   Post-op plan: routine recovery

## 2021-06-20 NOTE — ANESTHESIA CARE TRANSFER NOTE
Last vitals:   Vitals:    08/28/18 0948   BP: 132/77   Pulse: 82   Resp: 21   Temp: 36.8  C (98.3  F)   SpO2: 99%     Patient's level of consciousness is drowsy  Spontaneous respirations: yes  Maintains airway independently: yes  Dentition unchanged: yes  Oropharynx: oropharynx clear of all foreign objects    QCDR Measures:  ASA# 20 - Surgical Safety Checklist: WHO surgical safety checklist completed prior to induction  PQRS# 430 - Adult PONV Prevention: 4558F - Pt received => 2 anti-emetic agents (different classes) preop & intraop  ASA# 8 - Peds PONV Prevention: NA - Not pediatric patient, not GA or 2 or more risk factors NOT present  PQRS# 424 - [place]in-op Temp Management: 4559F - At least one body temp DOCUMENTED => 35.5C or 95.9F within required timeframe  PQRS# 426 - PACU Transfer Protocol: - Transfer of care checklist used  ASA# 14 - Acute Post-op Pain: ASA14B - Patient did NOT experience pain >= 7 out of 10

## 2021-06-20 NOTE — LETTER
Letter by Jacobo Correa MD at      Author: Jacobo Correa MD Service: -- Author Type: --    Filed:  Encounter Date: 1/14/2020 Status: Signed         Blu Allen  [address] [facility] [address]            January 14, 2020        Dear Mr. Allen,        Below are the results from your recent visit:    Resulted Orders   Glycosylated Hemoglobin A1c   Result Value Ref Range    Hemoglobin A1c 7.2 (H) 3.5 - 6.0 %         Blu, your A1c dropped to 7.2.  That is excellent.  Keep up the good work.  We should visit again in 6 months.      Sincerely,        SANIA Correa MD, KEYLA  [facility]  [phone]

## 2021-06-20 NOTE — ANESTHESIA POSTPROCEDURE EVALUATION
Patient: Blu Allen  ROBOTIC ASSISTED RADICAL RETROPUBIC PROSTATECTOMY BILATERAL PELVIC LYMPH NODE DISSECTION LYSIS OF ADHESIONS  Anesthesia type: general    Patient location: PACU  Last vitals:   Vitals:    08/28/18 1214   BP: 113/66   Pulse: 78   Resp: 16   Temp: 36.7  C (98.1  F)   SpO2: 99%     Post vital signs: stable  Level of consciousness: awake and responds to simple questions  Post-anesthesia pain: pain controlled  Post-anesthesia nausea and vomiting: no  Pulmonary: unassisted, return to baseline  Cardiovascular: stable and blood pressure at baseline  Hydration: adequate  Anesthetic events: no    QCDR Measures:  ASA# 11 - Carolin-op Cardiac Arrest: ASA11B - Patient did NOT experience unanticipated cardiac arrest  ASA# 12 - Carolin-op Mortality Rate: ASA12B - Patient did NOT die  ASA# 13 - PACU Re-Intubation Rate: ASA13B - Patient did NOT require a new airway mgmt  ASA# 10 - Composite Anes Safety: ASA10A - No serious adverse event    Additional Notes:

## 2021-06-21 NOTE — LETTER
Letter by Jacobo Correa MD at      Author: Jacobo Correa MD Service: -- Author Type: --    Filed:  Encounter Date: 1/6/2021 Status: (Other)         Blu Allen  [address] [facility] [address]            January 6, 2021        Dear Mr. Allne,        Below are the results from your recent visit:    Resulted Orders   HM2(CBC w/o Differential)   Result Value Ref Range    WBC 7.1 4.0 - 11.0 thou/uL    RBC 4.64 4.40 - 6.20 mill/uL    Hemoglobin 14.9 14.0 - 18.0 g/dL    Hematocrit 44.4 40.0 - 54.0 %    MCV 96 80 - 100 fL    MCH 32.1 27.0 - 34.0 pg    MCHC 33.5 32.0 - 36.0 g/dL    RDW 13.0 11.0 - 14.5 %    Platelets 303 140 - 440 thou/uL    MPV 7.6 7.0 - 10.0 fL   Basic Metabolic Panel   Result Value Ref Range    Sodium 141 136 - 145 mmol/L    Potassium 4.7 3.5 - 5.0 mmol/L    Chloride 102 98 - 107 mmol/L    CO2 27 22 - 31 mmol/L    Anion Gap, Calculation 12 5 - 18 mmol/L    Glucose 219 (H) 70 - 125 mg/dL    Calcium 10.2 8.5 - 10.5 mg/dL    BUN 16 8 - 22 mg/dL    Creatinine 1.40 (H) 0.70 - 1.30 mg/dL    GFR MDRD Af Amer >60 >60 mL/min/1.73m2    GFR MDRD Non Af Amer 51 (L) >60 mL/min/1.73m2    Narrative    Fasting Glucose reference range is 70-99 mg/dL per  American Diabetes Association (ADA) guidelines.   Glycosylated Hemoglobin A1c   Result Value Ref Range    Hemoglobin A1c 7.7 (H) <=5.6 %   Lipid Cascade   Result Value Ref Range    Cholesterol 163 <=199 mg/dL    Triglycerides 111 <=149 mg/dL    HDL Cholesterol 66 >=40 mg/dL    LDL Calculated 75 <=129 mg/dL    Patient Fasting > 8hrs? Yes          Blu, as predicted your A1c jumped up to 7.7.  Your creatinine, which is kidney function, is also elevated slightly but not alarming.  I recommend that we visit again in 6 months and recheck everything.  In addition, diet and exercise are key to getting her A1c down to normal levels.    Sincerely,        SANIA Correa MD, KEYLA  [facility]  [phone]

## 2021-06-24 NOTE — TELEPHONE ENCOUNTER
RN cannot approve Refill Request    RN can NOT refill this medication Protocol failed and NO refill given. Last office visit: 6/12/2018 Jacobo Correa MD Last Physical: 8/7/2018 Last MTM visit: Visit date not found Last visit same specialty: 6/12/2018 Jacobo Correa MD.  Next visit within 3 mo: Visit date not found  Next physical within 3 mo: Visit date not found      Humble Pereira, Care Connection Triage/Med Refill 2/24/2019    Requested Prescriptions   Pending Prescriptions Disp Refills     fenofibrate (TRIGLIDE) 160 MG tablet [Pharmacy Med Name: FENOFIBRATE 160 MG TABLET] 90 tablet 1     Sig: TAKE 1 TABLET BY MOUTH EVERY DAY    Fenofibrate Refill Protocol Failed - 2/22/2019  9:50 AM       Failed - Fasting lipid cascade in last 12 months    Cholesterol   Date Value Ref Range Status   03/08/2017 148 <=199 mg/dL Final     Triglycerides   Date Value Ref Range Status   03/08/2017 175 (H) <=149 mg/dL Final     HDL Cholesterol   Date Value Ref Range Status   03/08/2017 42 >=40 mg/dL Final     LDL Calculated   Date Value Ref Range Status   03/08/2017 71 <=129 mg/dL Final     Patient Fasting > 8hrs?   Date Value Ref Range Status   03/08/2017 Yes  Final            Failed - AST or ALT in last 12 months    AST   Date Value Ref Range Status   03/08/2017 31 0 - 40 U/L Final     ALT   Date Value Ref Range Status   03/08/2017 43 0 - 45 U/L Final              Passed - Renal status in last 6 months    Creatinine   Date Value Ref Range Status   08/28/2018 1.50 (H) 0.70 - 1.30 mg/dL Final            Passed - PCP or prescribing provider visit in past 12 months      Last office visit with prescriber/PCP: 6/12/2018 Jacobo Correa MD OR same dept: 6/12/2018 Jacobo Correa MD OR same specialty: 6/12/2018 Jacobo Correa MD  Last physical: 8/7/2018 Last MTM visit: Visit date not found   Next visit within 3 mo: Visit date not found  Next physical within 3 mo: Visit date not found  Prescriber OR PCP:  Jacobo Correa MD  Last diagnosis associated with med order: 1. Essential Hypercholesterolemia  - fenofibrate (TRIGLIDE) 160 MG tablet [Pharmacy Med Name: FENOFIBRATE 160 MG TABLET]; TAKE 1 TABLET BY MOUTH EVERY DAY  Dispense: 90 tablet; Refill: 1    2. Benign Essential Hypertension  - lisinopril-hydrochlorothiazide (PRINZIDE,ZESTORETIC) 10-12.5 mg per tablet; TAKE 1 TABLET BY MOUTH EVERY DAY  Dispense: 90 tablet; Refill: 1    If protocol passes may refill for 12 months if within 3 months of last provider visit (or a total of 15 months).           Signed Prescriptions Disp Refills     lisinopril-hydrochlorothiazide (PRINZIDE,ZESTORETIC) 10-12.5 mg per tablet 90 tablet 1     Sig: TAKE 1 TABLET BY MOUTH EVERY DAY    Diuretics/Combination Diuretics Refill Protocol  Passed - 2/22/2019  9:50 AM       Passed - Visit with PCP or prescribing provider visit in past 12 months    Last office visit with prescriber/PCP: 6/12/2018 Jacobo Correa MD OR same dept: 6/12/2018 Jacobo Correa MD OR same specialty: 6/12/2018 Jacobo Correa MD  Last physical: 8/7/2018 Last MTM visit: Visit date not found   Next visit within 3 mo: Visit date not found  Next physical within 3 mo: Visit date not found  Prescriber OR PCP: Jacobo Correa MD  Last diagnosis associated with med order: 1. Essential Hypercholesterolemia  - fenofibrate (TRIGLIDE) 160 MG tablet [Pharmacy Med Name: FENOFIBRATE 160 MG TABLET]; TAKE 1 TABLET BY MOUTH EVERY DAY  Dispense: 90 tablet; Refill: 1    2. Benign Essential Hypertension  - lisinopril-hydrochlorothiazide (PRINZIDE,ZESTORETIC) 10-12.5 mg per tablet; TAKE 1 TABLET BY MOUTH EVERY DAY  Dispense: 90 tablet; Refill: 1    If protocol passes may refill for 12 months if within 3 months of last provider visit (or a total of 15 months).            Passed - Serum Potassium in past 12 months     Lab Results   Component Value Date    Potassium 4.1 08/28/2018            Passed - Serum Sodium in  past 12 months     Lab Results   Component Value Date    Sodium 139 08/28/2018            Passed - Blood pressure on file in past 12 months    BP Readings from Last 1 Encounters:   08/29/18 125/77            Passed - Serum Creatinine in past 12 months     Creatinine   Date Value Ref Range Status   08/28/2018 1.50 (H) 0.70 - 1.30 mg/dL Final

## 2021-06-24 NOTE — TELEPHONE ENCOUNTER
Refill Approved (lisinopril/hctz)     Rx renewed per Medication Renewal Policy. Medication was last renewed on 7/24/18    Humble Pereira, [facility] Connection Triage/Med Refill 2/24/2019     Requested Prescriptions   Pending Prescriptions Disp Refills     fenofibrate (TRIGLIDE) 160 MG tablet [Pharmacy Med Name: FENOFIBRATE 160 MG TABLET] 90 tablet 1     Sig: TAKE 1 TABLET BY MOUTH EVERY DAY    Fenofibrate Refill Protocol Failed - 2/22/2019  9:50 AM       Failed - Fasting lipid cascade in last 12 months    Cholesterol   Date Value Ref Range Status   03/08/2017 148 <=199 mg/dL Final     Triglycerides   Date Value Ref Range Status   03/08/2017 175 (H) <=149 mg/dL Final     HDL Cholesterol   Date Value Ref Range Status   03/08/2017 42 >=40 mg/dL Final     LDL Calculated   Date Value Ref Range Status   03/08/2017 71 <=129 mg/dL Final     Patient Fasting > 8hrs?   Date Value Ref Range Status   03/08/2017 Yes  Final            Failed - AST or ALT in last 12 months    AST   Date Value Ref Range Status   03/08/2017 31 0 - 40 U/L Final     ALT   Date Value Ref Range Status   03/08/2017 43 0 - 45 U/L Final              Passed - Renal status in last 6 months    Creatinine   Date Value Ref Range Status   08/28/2018 1.50 (H) 0.70 - 1.30 mg/dL Final            Passed - PCP or prescribing provider visit in past 12 months      Last office visit with prescriber/PCP: 6/12/2018 Jacobo Correa MD OR same dept: 6/12/2018 Jacobo Correa MD OR same specialty: 6/12/2018 Jacobo Correa MD  Last physical: 8/7/2018 Last MTM visit: Visit date not found   Next visit within 3 mo: Visit date not found  Next physical within 3 mo: Visit date not found  Prescriber OR PCP: Jacobo Correa MD  Last diagnosis associated with med order: 1. Essential Hypercholesterolemia  - fenofibrate (TRIGLIDE) 160 MG tablet [Pharmacy Med Name: FENOFIBRATE 160 MG TABLET]; TAKE 1 TABLET BY MOUTH EVERY DAY  Dispense: 90 tablet; Refill: 1    2.  Benign Essential Hypertension  - lisinopril-hydrochlorothiazide (PRINZIDE,ZESTORETIC) 10-12.5 mg per tablet [Pharmacy Med Name: LISINOPRIL-HCTZ 10-12.5 MG TAB]; TAKE 1 TABLET BY MOUTH EVERY DAY  Dispense: 90 tablet; Refill: 0    If protocol passes may refill for 12 months if within 3 months of last provider visit (or a total of 15 months).             lisinopril-hydrochlorothiazide (PRINZIDE,ZESTORETIC) 10-12.5 mg per tablet [Pharmacy Med Name: LISINOPRIL-HCTZ 10-12.5 MG TAB] 90 tablet 0     Sig: TAKE 1 TABLET BY MOUTH EVERY DAY    Diuretics/Combination Diuretics Refill Protocol  Passed - 2/22/2019  9:50 AM       Passed - Visit with PCP or prescribing provider visit in past 12 months    Last office visit with prescriber/PCP: 6/12/2018 Jacobo Correa MD OR same dept: 6/12/2018 Jacobo Correa MD OR same specialty: 6/12/2018 Jacobo Correa MD  Last physical: 8/7/2018 Last MTM visit: Visit date not found   Next visit within 3 mo: Visit date not found  Next physical within 3 mo: Visit date not found  Prescriber OR PCP: Jacobo Correa MD  Last diagnosis associated with med order: 1. Essential Hypercholesterolemia  - fenofibrate (TRIGLIDE) 160 MG tablet [Pharmacy Med Name: FENOFIBRATE 160 MG TABLET]; TAKE 1 TABLET BY MOUTH EVERY DAY  Dispense: 90 tablet; Refill: 1    2. Benign Essential Hypertension  - lisinopril-hydrochlorothiazide (PRINZIDE,ZESTORETIC) 10-12.5 mg per tablet [Pharmacy Med Name: LISINOPRIL-HCTZ 10-12.5 MG TAB]; TAKE 1 TABLET BY MOUTH EVERY DAY  Dispense: 90 tablet; Refill: 0    If protocol passes may refill for 12 months if within 3 months of last provider visit (or a total of 15 months).            Passed - Serum Potassium in past 12 months     Lab Results   Component Value Date    Potassium 4.1 08/28/2018            Passed - Serum Sodium in past 12 months     Lab Results   Component Value Date    Sodium 139 08/28/2018            Passed - Blood pressure on file in past 12 months     BP Readings from Last 1 Encounters:   08/29/18 125/77            Passed - Serum Creatinine in past 12 months     Creatinine   Date Value Ref Range Status   08/28/2018 1.50 (H) 0.70 - 1.30 mg/dL Final

## 2021-06-26 ENCOUNTER — HEALTH MAINTENANCE LETTER (OUTPATIENT)
Age: 66
End: 2021-06-26

## 2021-07-03 NOTE — ADDENDUM NOTE
Addendum Note by Jacobo Correa MD at 6/13/2018  9:04 AM     Author: Jacobo Correa MD Service: -- Author Type: Physician    Filed: 6/13/2018  9:04 AM Encounter Date: 6/12/2018 Status: Signed    : Jacobo Correa MD (Physician)    Addended by: JACOBO CORREA on: 6/13/2018 09:04 AM        Modules accepted: Orders

## 2021-07-06 ENCOUNTER — RECORDS - HEALTHEAST (OUTPATIENT)
Dept: ADMINISTRATIVE | Facility: OTHER | Age: 66
End: 2021-07-06

## 2021-07-09 ENCOUNTER — COMMUNICATION - HEALTHEAST (OUTPATIENT)
Dept: FAMILY MEDICINE | Facility: CLINIC | Age: 66
End: 2021-07-09

## 2021-07-09 DIAGNOSIS — I10 ESSENTIAL HYPERTENSION, BENIGN: ICD-10-CM

## 2021-07-09 DIAGNOSIS — E11.9 DM (DIABETES MELLITUS) (H): ICD-10-CM

## 2021-07-09 RX ORDER — LISINOPRIL/HYDROCHLOROTHIAZIDE 10-12.5 MG
TABLET ORAL
Qty: 15 TABLET | Refills: 0 | Status: SHIPPED | OUTPATIENT
Start: 2021-07-09 | End: 2021-07-23

## 2021-07-09 NOTE — TELEPHONE ENCOUNTER
Telephone Encounter by Courtney Lane CMA at 7/9/2021 12:51 PM     Author: Courtney Lane CMA Service: -- Author Type: Certified Medical Assistant    Filed: 7/9/2021 12:52 PM Encounter Date: 7/9/2021 Status: Signed    : Courtney Lane CMA (Certified Medical Assistant)       appt scheduled 7/20/21. Please bridge medication is appropriate.

## 2021-07-09 NOTE — TELEPHONE ENCOUNTER
Telephone Encounter by Luke Hardwick RN at 7/9/2021 10:30 AM     Author: Luke Hardwick RN Service: -- Author Type: Registered Nurse    Filed: 7/9/2021 10:31 AM Encounter Date: 7/9/2021 Status: Signed    : Luke Hardwick RN (Registered Nurse)       Former patient of Correa & has not established care with another provider.  Please assign refill request to covering provider per Clinic standard process.      RN cannot approve Refill Request    RN can NOT refill this medication Protocol failed and NO refill given and no pcp. Last office visit: 1/5/2021 Jacobo Correa MD Last Physical: 8/7/2018 Last MTM visit: Visit date not found Last visit same specialty: 1/5/2021 Jacobo Correa MD.  Next visit within 3 mo: Visit date not found  Next physical within 3 mo: Visit date not found      [facility] Triage/Med Refill 7/9/2021    Requested Prescriptions   Pending Prescriptions Disp Refills   ? lisinopriL-hydrochlorothiazide (PRINZIDE,ZESTORETIC) 10-12.5 mg per tablet [Pharmacy Med Name: Lisinopril-hydroCHLOROthiazide 10-12.5 MG Oral Tablet] 90 tablet 0     Sig: Take 1 tablet by mouth once daily       Diuretics/Combination Diuretics Refill Protocol  Passed - 7/9/2021 10:26 AM        Passed - Visit with PCP or prescribing provider visit in past 12 months     Last office visit with prescriber/PCP: 1/5/2021 Jacobo Correa MD OR same dept: 1/5/2021 Jacobo Correa MD OR same specialty: 1/5/2021 Jacobo Correa MD  Last physical: 8/7/2018 Last MTM visit: Visit date not found   Next visit within 3 mo: Visit date not found  Next physical within 3 mo: Visit date not found  Prescriber OR PCP: Jacobo Correa MD  Last diagnosis associated with med order: 1. Benign Essential Hypertension  - lisinopriL-hydrochlorothiazide (PRINZIDE,ZESTORETIC) 10-12.5 mg per tablet [Pharmacy Med Name: Lisinopril-hydroCHLOROthiazide 10-12.5 MG Oral Tablet]; Take 1 tablet by mouth once daily   Dispense: 90 tablet; Refill: 0    2. DM (diabetes mellitus) (H)  - metFORMIN (GLUCOPHAGE) 1000 MG tablet [Pharmacy Med Name: metFORMIN HCl 1000 MG Oral Tablet]; TAKE 1 TABLET BY MOUTH TWICE DAILY WITH MEALS  Dispense: 180 tablet; Refill: 0    If protocol passes may refill for 12 months if within 3 months of last provider visit (or a total of 15 months).             Passed - Serum Potassium in past 12 months      Lab Results   Component Value Date    Potassium 4.7 01/05/2021             Passed - Serum Sodium in past 12 months      Lab Results   Component Value Date    Sodium 141 01/05/2021             Passed - Blood pressure on file in past 12 months     BP Readings from Last 1 Encounters:   01/05/21 135/65             Passed - Serum Creatinine in past 12 months      Creatinine   Date Value Ref Range Status   01/05/2021 1.40 (H) 0.70 - 1.30 mg/dL Final              ? metFORMIN (GLUCOPHAGE) 1000 MG tablet [Pharmacy Med Name: metFORMIN HCl 1000 MG Oral Tablet] 180 tablet 0     Sig: TAKE 1 TABLET BY MOUTH TWICE DAILY WITH MEALS       Metformin Refill Protocol Failed - 7/9/2021 10:26 AM        Failed - LFT or AST or ALT in last 12 months     Albumin   Date Value Ref Range Status   03/08/2017 4.0 3.5 - 5.0 g/dL Final     Bilirubin, Total   Date Value Ref Range Status   03/08/2017 0.5 0.0 - 1.0 mg/dL Final     Bilirubin, Direct   Date Value Ref Range Status   04/19/2013 0.2 <0.6 mg/dL Final     Alkaline Phosphatase   Date Value Ref Range Status   03/08/2017 50 45 - 120 U/L Final     AST   Date Value Ref Range Status   03/08/2017 31 0 - 40 U/L Final     ALT   Date Value Ref Range Status   03/08/2017 43 0 - 45 U/L Final     Protein, Total   Date Value Ref Range Status   03/08/2017 7.2 6.0 - 8.0 g/dL Final                Failed - Visit with PCP or prescribing provider visit in last 6 months or next 3 months     Last office visit with prescriber/PCP: Visit date not found OR same dept: 1/5/2021 Jacobo Correa MD OR same  specialty: 1/5/2021 Jacobo Correa MD Last physical: Visit date not found Last MTM visit: Visit date not found         Next appt within 3 mo: Visit date not found  Next physical within 3 mo: Visit date not found  Prescriber OR PCP: Jacobo Correa MD  Last diagnosis associated with med order: 1. Benign Essential Hypertension  - lisinopriL-hydrochlorothiazide (PRINZIDE,ZESTORETIC) 10-12.5 mg per tablet [Pharmacy Med Name: Lisinopril-hydroCHLOROthiazide 10-12.5 MG Oral Tablet]; Take 1 tablet by mouth once daily  Dispense: 90 tablet; Refill: 0    2. DM (diabetes mellitus) (H)  - metFORMIN (GLUCOPHAGE) 1000 MG tablet [Pharmacy Med Name: metFORMIN HCl 1000 MG Oral Tablet]; TAKE 1 TABLET BY MOUTH TWICE DAILY WITH MEALS  Dispense: 180 tablet; Refill: 0     If protocol passes may refill for 12 months if within 3 months of last provider visit (or a total of 15 months).           Failed - A1C in last 6 months     Hemoglobin A1c   Date Value Ref Range Status   01/05/2021 7.7 (H) <=5.6 % Final               Failed - Microalbumin in last year      Microalbumin, Random Urine   Date Value Ref Range Status   07/15/2015 0.90 0.00 - 1.99 mg/dL Final                  Passed - Blood pressure in last 12 months     BP Readings from Last 1 Encounters:   01/05/21 135/65             Passed - GFR or Serum Creatinine in last 6 months     GFR MDRD Non Af Amer   Date Value Ref Range Status   01/05/2021 51 (L) >60 mL/min/1.73m2 Final     GFR MDRD Af Amer   Date Value Ref Range Status   01/05/2021 >60 >60 mL/min/1.73m2 Final

## 2021-07-09 NOTE — TELEPHONE ENCOUNTER
Telephone Encounter by Vidya Montalvo NP at 7/9/2021 12:46 PM     Author: Vidya Montalvo NP Service: -- Author Type: Nurse Practitioner    Filed: 7/9/2021 12:46 PM Encounter Date: 7/9/2021 Status: Signed    : Vidya Montalvo NP (Nurse Practitioner)       Patient needs appointment. Due for lab work.

## 2021-07-09 NOTE — TELEPHONE ENCOUNTER
Telephone Encounter by Vidya Montalvo NP at 7/9/2021 10:56 AM     Author: Vidya Montalvo NP Service: -- Author Type: Nurse Practitioner    Filed: 7/9/2021 10:57 AM Encounter Date: 7/9/2021 Status: Signed    : Vidya Montalvo NP (Nurse Practitioner)       Patient is overdue for med check and lab work. Correa patient. Please get scheduled.

## 2021-07-16 ENCOUNTER — TRANSFERRED RECORDS (OUTPATIENT)
Dept: MULTI SPECIALTY CLINIC | Facility: CLINIC | Age: 66
End: 2021-07-16

## 2021-07-16 ENCOUNTER — TRANSFERRED RECORDS (OUTPATIENT)
Dept: HEALTH INFORMATION MANAGEMENT | Facility: CLINIC | Age: 66
End: 2021-07-16

## 2021-07-16 LAB — RETINOPATHY: NORMAL

## 2021-07-20 ENCOUNTER — OFFICE VISIT (OUTPATIENT)
Dept: FAMILY MEDICINE | Facility: CLINIC | Age: 66
End: 2021-07-20
Payer: MEDICARE

## 2021-07-20 VITALS
HEIGHT: 67 IN | OXYGEN SATURATION: 97 % | WEIGHT: 199 LBS | DIASTOLIC BLOOD PRESSURE: 72 MMHG | HEART RATE: 64 BPM | BODY MASS INDEX: 31.23 KG/M2 | SYSTOLIC BLOOD PRESSURE: 126 MMHG

## 2021-07-20 DIAGNOSIS — I10 BENIGN ESSENTIAL HYPERTENSION: Primary | ICD-10-CM

## 2021-07-20 DIAGNOSIS — Z71.89 COUNSELING ON HEALTH CARE DIRECTIVE: ICD-10-CM

## 2021-07-20 DIAGNOSIS — N18.31 TYPE 2 DIABETES MELLITUS WITH STAGE 3A CHRONIC KIDNEY DISEASE, WITHOUT LONG-TERM CURRENT USE OF INSULIN (H): ICD-10-CM

## 2021-07-20 DIAGNOSIS — E66.811 CLASS 1 OBESITY DUE TO EXCESS CALORIES WITH SERIOUS COMORBIDITY AND BODY MASS INDEX (BMI) OF 31.0 TO 31.9 IN ADULT: ICD-10-CM

## 2021-07-20 DIAGNOSIS — N18.31 STAGE 3A CHRONIC KIDNEY DISEASE (H): ICD-10-CM

## 2021-07-20 DIAGNOSIS — E11.22 TYPE 2 DIABETES MELLITUS WITH STAGE 3A CHRONIC KIDNEY DISEASE, WITHOUT LONG-TERM CURRENT USE OF INSULIN (H): ICD-10-CM

## 2021-07-20 DIAGNOSIS — E66.09 CLASS 1 OBESITY DUE TO EXCESS CALORIES WITH SERIOUS COMORBIDITY AND BODY MASS INDEX (BMI) OF 31.0 TO 31.9 IN ADULT: ICD-10-CM

## 2021-07-20 PROBLEM — N18.30 CHRONIC KIDNEY DISEASE, STAGE 3 (H): Status: ACTIVE | Noted: 2021-07-20

## 2021-07-20 PROBLEM — C61 MALIGNANT TUMOR OF PROSTATE (H): Status: ACTIVE | Noted: 2018-07-27

## 2021-07-20 PROBLEM — N39.3 STRESS INCONTINENCE: Status: ACTIVE | Noted: 2018-12-13

## 2021-07-20 PROBLEM — R12 HEARTBURN: Status: RESOLVED | Noted: 2017-03-08 | Resolved: 2021-07-20

## 2021-07-20 LAB
ALBUMIN SERPL-MCNC: 4.3 G/DL (ref 3.5–5)
ALP SERPL-CCNC: 37 U/L (ref 45–120)
ALT SERPL W P-5'-P-CCNC: 26 U/L (ref 0–45)
ANION GAP SERPL CALCULATED.3IONS-SCNC: 10 MMOL/L (ref 5–18)
AST SERPL W P-5'-P-CCNC: 25 U/L (ref 0–40)
BILIRUB SERPL-MCNC: 0.5 MG/DL (ref 0–1)
BUN SERPL-MCNC: 17 MG/DL (ref 8–22)
CALCIUM SERPL-MCNC: 10.1 MG/DL (ref 8.5–10.5)
CHLORIDE BLD-SCNC: 105 MMOL/L (ref 98–107)
CO2 SERPL-SCNC: 27 MMOL/L (ref 22–31)
CREAT SERPL-MCNC: 1.26 MG/DL (ref 0.7–1.3)
CREAT UR-MCNC: 186 MG/DL
GFR SERPL CREATININE-BSD FRML MDRD: 59 ML/MIN/1.73M2
GLUCOSE BLD-MCNC: 212 MG/DL (ref 70–125)
HBA1C MFR BLD: 8 % (ref 0–5.6)
MICROALBUMIN UR-MCNC: 2.85 MG/DL (ref 0–1.99)
MICROALBUMIN/CREAT UR: 15.3 MG/G CR
POTASSIUM BLD-SCNC: 4.7 MMOL/L (ref 3.5–5)
PROT SERPL-MCNC: 7 G/DL (ref 6–8)
SODIUM SERPL-SCNC: 142 MMOL/L (ref 136–145)

## 2021-07-20 PROCEDURE — 83036 HEMOGLOBIN GLYCOSYLATED A1C: CPT | Performed by: NURSE PRACTITIONER

## 2021-07-20 PROCEDURE — 82043 UR ALBUMIN QUANTITATIVE: CPT | Performed by: NURSE PRACTITIONER

## 2021-07-20 PROCEDURE — 80053 COMPREHEN METABOLIC PANEL: CPT | Performed by: NURSE PRACTITIONER

## 2021-07-20 PROCEDURE — 36415 COLL VENOUS BLD VENIPUNCTURE: CPT | Performed by: NURSE PRACTITIONER

## 2021-07-20 PROCEDURE — 99214 OFFICE O/P EST MOD 30 MIN: CPT | Performed by: NURSE PRACTITIONER

## 2021-07-20 RX ORDER — TADALAFIL 10 MG/1
10 TABLET ORAL DAILY PRN
COMMUNITY
End: 2023-05-11

## 2021-07-20 ASSESSMENT — MIFFLIN-ST. JEOR: SCORE: 1637.32

## 2021-07-20 NOTE — PATIENT INSTRUCTIONS
Updating labs today.    We'll try to get your eye exam.    If we need to make adjustments to medications, we'll give you a call.

## 2021-07-20 NOTE — PROGRESS NOTES
NADINE for diabetic eye exam was faxed to [facility] at [phone].    Sandra Barnett, CMA

## 2021-07-20 NOTE — PROGRESS NOTES
"Chief Complaint   Patient presents with     Establish Care     From Dr. Correa.      Medication Therapy Management     Diabetes     Pt is fasting.        HPI: Patient presents today for medication check.  Overall things are going okay.  Last A1c did show a rise to 7.7%.  No polydipsia or polyuria.  Does not check blood sugars.  Continues on Metformin.  Blood pressure under control with current lisinopril hydrochlorothiazide.  No hypoglycemic events.  Eye exam was completed last week.  NADINE signed.    Known history of prostate cancer status post prostatectomy.  Continues to follow with urology for repeat PSAs.    ROS:Review of Systems - negative except for what's listed in the HPI    SH: The Patient's  reports that he has never smoked. He has never used smokeless tobacco. He reports current alcohol use of about 3.0 standard drinks of alcohol per week. He reports that he does not use drugs.      FH: The Patient's family history includes Diabetes in his mother; Lung Cancer in his father; No Known Problems in his brother, brother, brother, sister, sister, and sister; Stomach Cancer in his brother.     Meds:    Current Outpatient Medications   Medication     aspirin 81 MG EC tablet     fenofibrate (TRIGLIDE) 160 MG tablet     lisinopriL-hydrochlorothiazide (PRINZIDE,ZESTORETIC) 10-12.5 mg per tablet     metFORMIN (GLUCOPHAGE) 1000 MG tablet     Multiple Vitamins-Minerals (MULTIVITAL PO)     simvastatin (ZOCOR) 80 MG tablet     tadalafil (CIALIS) 10 MG tablet     No current facility-administered medications for this visit.       O:  /72   Pulse 64   Ht 1.695 m (5' 6.75\")   Wt 90.3 kg (199 lb)   SpO2 97%   BMI 31.40 kg/m      Physical Examination:   General appearance - alert, well appearing, and in no distress  Mental status - alert, oriented to person, place, and time  Eyes -PERRLA  Neck - no significant adenopathy  Lymphatics - no palpable lymphadenopathy  Chest - clear to auscultation, no wheezes, rales or " rhonchi, symmetric air entry  Heart - normal rate and regular rhythm, S1 and S2 normal, no murmurs noted  Abdomen - soft, nontender, nondistended, no masses or organomegaly  Neurological - alert, oriented, normal speech, no focal findings or movement disorder noted, neck supple without rigidity, cranial nerves II through XII intact, motor and sensory grossly normal bilaterally, normal muscle tone, no tremors, strength 5/5, normal sensation with monofilament probing long the plantar surface of the feet.  Extremities - peripheral pulses normal, no peripheral edema  Skin - normal coloration and turgor.      A/P:       ICD-10-CM    1. Benign essential hypertension  I10 Comprehensive metabolic panel (BMP + Alb, Alk Phos, ALT, AST, Total. Bili, TP)     Comprehensive metabolic panel (BMP + Alb, Alk Phos, ALT, AST, Total. Bili, TP)   2. Stage 3a chronic kidney disease  N18.31 Comprehensive metabolic panel (BMP + Alb, Alk Phos, ALT, AST, Total. Bili, TP)     Comprehensive metabolic panel (BMP + Alb, Alk Phos, ALT, AST, Total. Bili, TP)   3. Type 2 diabetes mellitus with stage 3a chronic kidney disease, without long-term current use of insulin (H)  E11.21 Hemoglobin A1c    N18.31 Comprehensive metabolic panel (BMP + Alb, Alk Phos, ALT, AST, Total. Bili, TP)     Albumin Random Urine Quantitative with Creat Ratio     Hemoglobin A1c     Comprehensive metabolic panel (BMP + Alb, Alk Phos, ALT, AST, Total. Bili, TP)     Albumin Random Urine Quantitative with Creat Ratio   4. Counseling on health care directive  Z71.89    5. Class 1 obesity due to excess calories with serious comorbidity and body mass index (BMI) of 31.0 to 31.9 in adult  E66.09     Z68.31      Patient admits to not being as physically active lately due to the hot weather.  Recheck diabetic labs.  If rising A1c, add on glipizide.  Counseled on advanced healthcare directives.  Keep follow-up with urology.  No change to current antihypertensives or  fenofibrate.      Benign essential hypertension  - Comprehensive metabolic panel (BMP + Alb, Alk Phos, ALT, AST, Total. Bili, TP)  - Comprehensive metabolic panel (BMP + Alb, Alk Phos, ALT, AST, Total. Bili, TP)    Stage 3a chronic kidney disease  - Comprehensive metabolic panel (BMP + Alb, Alk Phos, ALT, AST, Total. Bili, TP)  - Comprehensive metabolic panel (BMP + Alb, Alk Phos, ALT, AST, Total. Bili, TP)    Type 2 diabetes mellitus with stage 3a chronic kidney disease, without long-term current use of insulin (H)  - Hemoglobin A1c  - Comprehensive metabolic panel (BMP + Alb, Alk Phos, ALT, AST, Total. Bili, TP)  - Albumin Random Urine Quantitative with Creat Ratio  - Hemoglobin A1c  - Comprehensive metabolic panel (BMP + Alb, Alk Phos, ALT, AST, Total. Bili, TP)  - Albumin Random Urine Quantitative with Creat Ratio    Counseling on health care directive    Class 1 obesity due to excess calories with serious comorbidity and body mass index (BMI) of 31.0 to 31.9 in adult        Van Schulz CNP      This note has been dictated using voice recognition software. Any grammatical or context distortions are unintentional and inherent to the software.

## 2021-07-21 ENCOUNTER — TRANSFERRED RECORDS (OUTPATIENT)
Dept: HEALTH INFORMATION MANAGEMENT | Facility: CLINIC | Age: 66
End: 2021-07-21

## 2021-07-23 DIAGNOSIS — E11.9 TYPE 2 DIABETES MELLITUS WITHOUT COMPLICATION, WITHOUT LONG-TERM CURRENT USE OF INSULIN (H): Primary | ICD-10-CM

## 2021-07-23 DIAGNOSIS — E78.00 PURE HYPERCHOLESTEROLEMIA: ICD-10-CM

## 2021-07-23 DIAGNOSIS — I10 ESSENTIAL HYPERTENSION, BENIGN: ICD-10-CM

## 2021-07-23 RX ORDER — LISINOPRIL/HYDROCHLOROTHIAZIDE 10-12.5 MG
TABLET ORAL
Qty: 90 TABLET | Refills: 0 | Status: SHIPPED | OUTPATIENT
Start: 2021-07-23 | End: 2021-10-17

## 2021-07-23 RX ORDER — GLIPIZIDE 5 MG/1
5 TABLET, FILM COATED, EXTENDED RELEASE ORAL DAILY
Qty: 90 TABLET | Refills: 0 | Status: SHIPPED | OUTPATIENT
Start: 2021-07-23 | End: 2021-10-17

## 2021-07-23 RX ORDER — FENOFIBRATE 160 MG/1
TABLET ORAL
Qty: 90 TABLET | Refills: 0 | Status: SHIPPED | OUTPATIENT
Start: 2021-07-23 | End: 2021-08-05

## 2021-07-30 DIAGNOSIS — E11.9 TYPE 2 DIABETES MELLITUS WITHOUT COMPLICATION, WITHOUT LONG-TERM CURRENT USE OF INSULIN (H): ICD-10-CM

## 2021-08-04 ENCOUNTER — MYC MEDICAL ADVICE (OUTPATIENT)
Dept: FAMILY MEDICINE | Facility: CLINIC | Age: 66
End: 2021-08-04

## 2021-08-04 DIAGNOSIS — E78.00 PURE HYPERCHOLESTEROLEMIA: ICD-10-CM

## 2021-08-04 RX ORDER — FENOFIBRATE 160 MG/1
TABLET ORAL
Qty: 90 TABLET | Refills: 0 | Status: CANCELLED | OUTPATIENT
Start: 2021-08-04

## 2021-08-04 NOTE — TELEPHONE ENCOUNTER
"Routing refill request to provider for review/approval because:  Early refill request    Last Written Prescription Date:  7/23/21  Last Fill Quantity: 180,  # refills: 0   Last office visit provider:  1/5/21     Requested Prescriptions   Pending Prescriptions Disp Refills     metFORMIN (GLUCOPHAGE) 1000 MG tablet 180 tablet 0     Sig: [METFORMIN (GLUCOPHAGE) 1000 MG TABLET] TAKE 1 TABLET BY MOUTH TWICE DAILY WITH MEALS       Biguanide Agents Passed - 7/30/2021  3:45 PM        Passed - Patient is age 10 or older        Passed - Patient has documented A1c within the specified period of time.     If HgbA1C is 8 or greater, it needs to be on file within the past 3 months.  If less than 8, must be on file within the past 6 months.     Recent Labs   Lab Test 07/20/21  0824   A1C 8.0*             Passed - Patient's CR is NOT>1.4 OR Patient's EGFR is NOT<45 within past 12 mos.     Recent Labs   Lab Test 07/20/21  0824 01/05/21  0926   GFRESTIMATED 59* 51*   GFRESTBLACK  --  >60       Recent Labs   Lab Test 07/20/21  0824   CR 1.26             Passed - Patient does NOT have a diagnosis of CHF.        Passed - Medication is active on med list        Passed - Recent (6 mo) or future (30 days) visit within the authorizing provider's specialty     Patient had office visit in the last 6 months or has a visit in the next 30 days with authorizing provider or within the authorizing provider's specialty.  See \"Patient Info\" tab in inbasket, or \"Choose Columns\" in Meds & Orders section of the refill encounter.                 Luke Hardwick RN 08/04/21 8:59 AM  "

## 2021-10-16 ENCOUNTER — HEALTH MAINTENANCE LETTER (OUTPATIENT)
Age: 66
End: 2021-10-16

## 2021-10-16 DIAGNOSIS — I10 ESSENTIAL HYPERTENSION, BENIGN: ICD-10-CM

## 2021-10-16 DIAGNOSIS — E11.9 TYPE 2 DIABETES MELLITUS WITHOUT COMPLICATION, WITHOUT LONG-TERM CURRENT USE OF INSULIN (H): ICD-10-CM

## 2021-10-17 RX ORDER — LISINOPRIL/HYDROCHLOROTHIAZIDE 10-12.5 MG
TABLET ORAL
Qty: 90 TABLET | Refills: 3 | Status: SHIPPED | OUTPATIENT
Start: 2021-10-17 | End: 2022-10-19

## 2021-10-17 RX ORDER — GLIPIZIDE 5 MG/1
5 TABLET, FILM COATED, EXTENDED RELEASE ORAL DAILY
Qty: 90 TABLET | Refills: 1 | Status: SHIPPED | OUTPATIENT
Start: 2021-10-17 | End: 2022-04-12

## 2021-10-17 NOTE — TELEPHONE ENCOUNTER
"Last Written Prescription Date:  7/23/21  Last Fill Quantity: 90,  # refills: 0   Last office visit provider:  7/20/21     Last Written Prescription Date:  8/5/21  Last Fill Quantity: 180,  # refills: 0   Last office visit provider:  7/20/21    Requested Prescriptions   Pending Prescriptions Disp Refills     metFORMIN (GLUCOPHAGE) 1000 MG tablet [Pharmacy Med Name: metFORMIN HCl 1000 MG Oral Tablet] 180 tablet 0     Sig: TAKE 1 TABLET BY MOUTH TWICE DAILY WITH MEALS       Biguanide Agents Passed - 10/16/2021 10:31 AM        Passed - Patient is age 10 or older        Passed - Patient has documented A1c within the specified period of time.     If HgbA1C is 8 or greater, it needs to be on file within the past 3 months.  If less than 8, must be on file within the past 6 months.     Recent Labs   Lab Test 07/20/21  0824   A1C 8.0*             Passed - Patient's CR is NOT>1.4 OR Patient's EGFR is NOT<45 within past 12 mos.     Recent Labs   Lab Test 07/20/21  0824 01/05/21  0926   GFRESTIMATED 59* 51*   GFRESTBLACK  --  >60       Recent Labs   Lab Test 07/20/21  0824   CR 1.26             Passed - Patient does NOT have a diagnosis of CHF.        Passed - Medication is active on med list        Passed - Recent (6 mo) or future (30 days) visit within the authorizing provider's specialty     Patient had office visit in the last 6 months or has a visit in the next 30 days with authorizing provider or within the authorizing provider's specialty.  See \"Patient Info\" tab in inbasket, or \"Choose Columns\" in Meds & Orders section of the refill encounter.               glipiZIDE (GLUCOTROL XL) 5 MG 24 hr tablet [Pharmacy Med Name: glipiZIDE ER 5 MG Oral Tablet Extended Release 24 Hour] 90 tablet 0     Sig: Take 1 tablet by mouth once daily       Sulfonylurea Agents Passed - 10/16/2021 10:31 AM        Passed - Patient has documented A1c within the specified period of time.     If HgbA1C is 8 or greater, it needs to be on file within " "the past 3 months.  If less than 8, must be on file within the past 6 months.     Recent Labs   Lab Test 07/20/21  0824   A1C 8.0*             Passed - Medication is active on med list        Passed - Patient is age 18 or older        Passed - Patient has a recent creatinine (normal) within the past 12 mos.     Recent Labs   Lab Test 07/20/21  0824   CR 1.26       Ok to refill medication if creatinine is low          Passed - Recent (6 mo) or future (30 days) visit within the authorizing provider's specialty     Patient had office visit in the last 6 months or has a visit in the next 30 days with authorizing provider or within the authorizing provider's specialty.  See \"Patient Info\" tab in inbasket, or \"Choose Columns\" in Meds & Orders section of the refill encounter.               lisinopril-hydrochlorothiazide (ZESTORETIC) 10-12.5 MG tablet [Pharmacy Med Name: Lisinopril-hydroCHLOROthiazide 10-12.5 MG Oral Tablet] 90 tablet 0     Sig: Take 1 tablet by mouth once daily       Diuretics (Including Combos) Protocol Passed - 10/16/2021 10:31 AM        Passed - Blood pressure under 140/90 in past 12 months     BP Readings from Last 3 Encounters:   07/20/21 126/72   01/05/21 135/65   07/07/20 (!) 142/68                 Passed - Recent (12 mo) or future (30 days) visit within the authorizing provider's specialty     Patient has had an office visit with the authorizing provider or a provider within the authorizing providers department within the previous 12 mos or has a future within next 30 days. See \"Patient Info\" tab in inbasket, or \"Choose Columns\" in Meds & Orders section of the refill encounter.              Passed - Medication is active on med list        Passed - Patient is age 18 or older        Passed - Normal serum creatinine on file in past 12 months     Recent Labs   Lab Test 07/20/21  0824   CR 1.26              Passed - Normal serum potassium on file in past 12 months     Recent Labs   Lab Test " "07/20/21  0824   POTASSIUM 4.7                    Passed - Normal serum sodium on file in past 12 months     Recent Labs   Lab Test 07/20/21  0824                ACE Inhibitors (Including Combos) Protocol Passed - 10/16/2021 10:31 AM        Passed - Blood pressure under 140/90 in past 12 months     BP Readings from Last 3 Encounters:   07/20/21 126/72   01/05/21 135/65   07/07/20 (!) 142/68                 Passed - Recent (12 mo) or future (30 days) visit within the authorizing provider's specialty     Patient has had an office visit with the authorizing provider or a provider within the authorizing providers department within the previous 12 mos or has a future within next 30 days. See \"Patient Info\" tab in inbasket, or \"Choose Columns\" in Meds & Orders section of the refill encounter.              Passed - Medication is active on med list        Passed - Patient is age 18 or older        Passed - Normal serum creatinine on file in past 12 months     Recent Labs   Lab Test 07/20/21  0824   CR 1.26       Ok to refill medication if creatinine is low          Passed - Normal serum potassium on file in past 12 months     Recent Labs   Lab Test 07/20/21  0824   POTASSIUM 4.7                  Luke Hardwick RN 10/17/21 2:17 PM  "

## 2021-10-18 DIAGNOSIS — E78.00 PURE HYPERCHOLESTEROLEMIA: ICD-10-CM

## 2021-10-19 RX ORDER — FENOFIBRATE 160 MG/1
TABLET ORAL
Qty: 90 TABLET | Refills: 1 | Status: SHIPPED | OUTPATIENT
Start: 2021-10-19 | End: 2022-05-05

## 2021-10-19 NOTE — TELEPHONE ENCOUNTER
"Routing refill request to provider for review/approval because:  Early refill requested.    Last Written Prescription Date:  8/5/21  Last Fill Quantity: 90,  # refills: 0   Last office visit provider:  7/20/21     Requested Prescriptions   Pending Prescriptions Disp Refills     fenofibrate (TRIGLIDE/LOFIBRA) 160 MG tablet [Pharmacy Med Name: Fenofibrate Oral Tablet 160 MG] 90 tablet 0     Sig: TAKE ONE TABLET BY MOUTH ONE TIME DAILY       Fibrates Passed - 10/18/2021 11:26 AM        Passed - Lipid panel on file in past 12 months     Recent Labs   Lab Test 01/05/21  0926   CHOL 163   TRIG 111   HDL 66   LDL 75               Passed - No abnormal creatine kinase in past 12 months     No lab results found.             Passed - Recent (12 mo) or future (30 days) visit within the authorizing provider's specialty     Patient has had an office visit with the authorizing provider or a provider within the authorizing providers department within the previous 12 mos or has a future within next 30 days. See \"Patient Info\" tab in inbasket, or \"Choose Columns\" in Meds & Orders section of the refill encounter.              Passed - Medication is active on med list        Passed - Patient is age 18 or older             Luke Hardwick RN 10/19/21 11:16 AM  "

## 2021-11-19 ENCOUNTER — OFFICE VISIT (OUTPATIENT)
Dept: FAMILY MEDICINE | Facility: CLINIC | Age: 66
End: 2021-11-19
Payer: MEDICARE

## 2021-11-19 VITALS
BODY MASS INDEX: 31.67 KG/M2 | DIASTOLIC BLOOD PRESSURE: 68 MMHG | OXYGEN SATURATION: 99 % | HEART RATE: 50 BPM | SYSTOLIC BLOOD PRESSURE: 118 MMHG | WEIGHT: 200.7 LBS

## 2021-11-19 DIAGNOSIS — N18.31 TYPE 2 DIABETES MELLITUS WITH STAGE 3A CHRONIC KIDNEY DISEASE, WITHOUT LONG-TERM CURRENT USE OF INSULIN (H): Primary | ICD-10-CM

## 2021-11-19 DIAGNOSIS — E11.22 TYPE 2 DIABETES MELLITUS WITH STAGE 3A CHRONIC KIDNEY DISEASE, WITHOUT LONG-TERM CURRENT USE OF INSULIN (H): Primary | ICD-10-CM

## 2021-11-19 DIAGNOSIS — C61 MALIGNANT TUMOR OF PROSTATE (H): ICD-10-CM

## 2021-11-19 DIAGNOSIS — K21.00 GASTROESOPHAGEAL REFLUX DISEASE WITH ESOPHAGITIS WITHOUT HEMORRHAGE: ICD-10-CM

## 2021-11-19 LAB
ALBUMIN SERPL-MCNC: 4.1 G/DL (ref 3.5–5)
ALP SERPL-CCNC: 36 U/L (ref 45–120)
ALT SERPL W P-5'-P-CCNC: 27 U/L (ref 0–45)
ANION GAP SERPL CALCULATED.3IONS-SCNC: 10 MMOL/L (ref 5–18)
AST SERPL W P-5'-P-CCNC: 26 U/L (ref 0–40)
BILIRUB SERPL-MCNC: 0.5 MG/DL (ref 0–1)
BUN SERPL-MCNC: 14 MG/DL (ref 8–22)
CALCIUM SERPL-MCNC: 10.3 MG/DL (ref 8.5–10.5)
CHLORIDE BLD-SCNC: 105 MMOL/L (ref 98–107)
CO2 SERPL-SCNC: 26 MMOL/L (ref 22–31)
CREAT SERPL-MCNC: 1.34 MG/DL (ref 0.7–1.3)
GFR SERPL CREATININE-BSD FRML MDRD: 54 ML/MIN/1.73M2
GLUCOSE BLD-MCNC: 123 MG/DL (ref 70–125)
HBA1C MFR BLD: 5.9 % (ref 0–5.6)
POTASSIUM BLD-SCNC: 4.6 MMOL/L (ref 3.5–5)
PROT SERPL-MCNC: 7 G/DL (ref 6–8)
SODIUM SERPL-SCNC: 141 MMOL/L (ref 136–145)

## 2021-11-19 PROCEDURE — 83036 HEMOGLOBIN GLYCOSYLATED A1C: CPT | Performed by: NURSE PRACTITIONER

## 2021-11-19 PROCEDURE — 99214 OFFICE O/P EST MOD 30 MIN: CPT | Performed by: NURSE PRACTITIONER

## 2021-11-19 PROCEDURE — 36415 COLL VENOUS BLD VENIPUNCTURE: CPT | Performed by: NURSE PRACTITIONER

## 2021-11-19 PROCEDURE — 80053 COMPREHEN METABOLIC PANEL: CPT | Performed by: NURSE PRACTITIONER

## 2021-11-19 NOTE — PATIENT INSTRUCTIONS
Updating labs today and we'll adjust medicine based on results.     Omeprazole sent to [facility] pharmacy. Remember, it may take a few days to kick in.

## 2021-11-19 NOTE — PROGRESS NOTES
Chief Complaint   Patient presents with     Recheck Medication     pt is fasting.        HPI: Patient presents today for diabetic check.  At the last visit we started glipizide.  No hypoglycemic events.  Polydipsia or polyuria.    Last A1c increased from 7.7% to 8%.    Answers for HPI/ROS submitted by the patient on 11/19/2021  Frequency of checking blood sugars:: a few times a month  What time of day are you checking your blood sugars : before meals  Have you had any blood sugars above 200?: No  Have you had any blood sugars below 70?: No  Hypoglycemia symptoms:: none  Diabetic concerns:: none  Paraesthesia present:: none of these symptoms  How many servings of fruits and vegetables do you eat daily?: 2-3  On average, how many sweetened beverages do you drink each day (Examples: soda, juice, sweet tea, etc.  Do NOT count diet or artificially sweetened beverages)?: 0  How many minutes a day do you exercise enough to make your heart beat faster?: 9 or less  How many days a week do you exercise enough to make your heart beat faster?: 3 or less  How many days per week do you miss taking your medication?: 0    Patient has a history of reflux.  Has gone a few years without any symptoms but has started to flareup.  Resolves with over-the-counter Tums, but would like to restart omeprazole.  No diaphoresis, nausea, LUO, chest pain with exertion, no lower extremity swelling    Continues to follow with urology.  PSA done every 6 months following history of prostate cancer    ROS:Review of Systems - negative except for what's listed in the HPI    SH: The Patient's  reports that he has never smoked. He has never used smokeless tobacco. He reports current alcohol use of about 3.0 standard drinks of alcohol per week. He reports that he does not use drugs.      FH: The Patient's family history includes Diabetes in his mother; Lung Cancer in his father; No Known Problems in his brother, brother, brother, sister, sister, and sister;  Stomach Cancer in his brother.     Meds:    Current Outpatient Medications   Medication     aspirin 81 MG EC tablet     fenofibrate (TRIGLIDE/LOFIBRA) 160 MG tablet     glipiZIDE (GLUCOTROL XL) 5 MG 24 hr tablet     lisinopril-hydrochlorothiazide (ZESTORETIC) 10-12.5 MG tablet     metFORMIN (GLUCOPHAGE) 1000 MG tablet     Multiple Vitamins-Minerals (MULTIVITAL PO)     omeprazole (PRILOSEC) 20 MG DR capsule     simvastatin (ZOCOR) 80 MG tablet     tadalafil (CIALIS) 10 MG tablet     No current facility-administered medications for this visit.       O:  /68   Pulse 50   Wt 91 kg (200 lb 11.2 oz)   SpO2 99%   BMI 31.67 kg/m      Physical Examination:   General appearance - alert, well appearing, and in no distress  Mental status - alert, oriented to person, place, and time  Lymphatics - no palpable lymphadenopathy  Chest - clear to auscultation, no wheezes, rales or rhonchi, symmetric air entry  Heart - normal rate and regular rhythm, S1 and S2 normal, no murmurs noted  Abdomen - soft, nontender, nondistended, no masses or organomegaly  Neurological - alert, oriented, normal speech, no focal findings or movement disorder noted, neck supple without rigidity, cranial nerves II through XII intact, motor and sensory grossly normal bilaterally, normal muscle tone, no tremors, strength 5/5  Extremities - peripheral pulses normal, no peripheral edema  Skin - normal coloration and turgor.      A/P:       ICD-10-CM    1. Type 2 diabetes mellitus with stage 3a chronic kidney disease, without long-term current use of insulin (H)  E11.22 Hemoglobin A1c    N18.31 Comprehensive metabolic panel (BMP + Alb, Alk Phos, ALT, AST, Total. Bili, TP)     Hemoglobin A1c     Comprehensive metabolic panel (BMP + Alb, Alk Phos, ALT, AST, Total. Bili, TP)   2. Gastroesophageal reflux disease with esophagitis without hemorrhage  K21.00 omeprazole (PRILOSEC) 20 MG DR capsule   3. Malignant tumor of prostate (H)  C61      Improvement in  A1c noted.  Continue same Metformin and glipizide.  Refill of omeprazole sent to the pharmacy.  Discussed weight loss and foods to avoid to try to help with reflux symptoms.  Continue follow-up with urology.  Reviewed outside urology note x1, PSA x1    Type 2 diabetes mellitus with stage 3a chronic kidney disease, without long-term current use of insulin (H)  - Hemoglobin A1c  - Comprehensive metabolic panel (BMP + Alb, Alk Phos, ALT, AST, Total. Bili, TP)  - Hemoglobin A1c  - Comprehensive metabolic panel (BMP + Alb, Alk Phos, ALT, AST, Total. Bili, TP)    Gastroesophageal reflux disease with esophagitis without hemorrhage  - omeprazole (PRILOSEC) 20 MG DR capsule  Dispense: 90 capsule; Refill: 3    Malignant tumor of prostate (H)        Van Schulz, CNP      This note has been dictated using voice recognition software. Any grammatical or context distortions are unintentional and inherent to the software.

## 2021-12-22 ENCOUNTER — TRANSFERRED RECORDS (OUTPATIENT)
Dept: HEALTH INFORMATION MANAGEMENT | Facility: CLINIC | Age: 66
End: 2021-12-22
Payer: MEDICARE

## 2022-01-18 ENCOUNTER — MYC MEDICAL ADVICE (OUTPATIENT)
Dept: FAMILY MEDICINE | Facility: CLINIC | Age: 67
End: 2022-01-18
Payer: MEDICARE

## 2022-01-18 DIAGNOSIS — E78.00 PURE HYPERCHOLESTEROLEMIA: ICD-10-CM

## 2022-01-21 RX ORDER — SIMVASTATIN 80 MG
TABLET ORAL
Qty: 90 TABLET | Refills: 3 | Status: SHIPPED | OUTPATIENT
Start: 2022-01-21 | End: 2022-10-17

## 2022-02-05 ENCOUNTER — HEALTH MAINTENANCE LETTER (OUTPATIENT)
Age: 67
End: 2022-02-05

## 2022-02-21 ENCOUNTER — OFFICE VISIT (OUTPATIENT)
Dept: INTERNAL MEDICINE | Facility: CLINIC | Age: 67
End: 2022-02-21
Payer: MEDICARE

## 2022-02-21 VITALS
HEART RATE: 76 BPM | SYSTOLIC BLOOD PRESSURE: 118 MMHG | BODY MASS INDEX: 31.71 KG/M2 | DIASTOLIC BLOOD PRESSURE: 60 MMHG | HEIGHT: 67 IN | WEIGHT: 202 LBS | OXYGEN SATURATION: 98 %

## 2022-02-21 DIAGNOSIS — R21 RASH: Primary | ICD-10-CM

## 2022-02-21 DIAGNOSIS — E11.9 TYPE 2 DIABETES MELLITUS WITHOUT COMPLICATION, WITHOUT LONG-TERM CURRENT USE OF INSULIN (H): ICD-10-CM

## 2022-02-21 PROCEDURE — 99214 OFFICE O/P EST MOD 30 MIN: CPT | Performed by: INTERNAL MEDICINE

## 2022-02-21 RX ORDER — TRIAMCINOLONE ACETONIDE 5 MG/G
CREAM TOPICAL 2 TIMES DAILY
Qty: 15 G | Refills: 1 | Status: SHIPPED | OUTPATIENT
Start: 2022-02-21 | End: 2022-04-12

## 2022-02-21 RX ORDER — TRIAMCINOLONE ACETONIDE 5 MG/G
CREAM TOPICAL 2 TIMES DAILY
Qty: 15 G | Refills: 1 | Status: SHIPPED | OUTPATIENT
Start: 2022-02-21 | End: 2022-02-21

## 2022-02-21 NOTE — PATIENT INSTRUCTIONS
You have a delayed-type hypersensitivity rash of your forearm with id reaction of your left wrist.  This is likely from topical exposure on your skin to a chemical from your fireplace.  Avoid skin contact with the fireplace.    This rash is similar to a poison ivy rash.  It should clear up over the next week with steroid cream.  Use the steroid cream in areas of rash twice a day for up to 2 weeks.    You appear due for a diabetic follow-up appointment.  I would recommend follow-up appointment with your primary care physician.

## 2022-02-21 NOTE — PROGRESS NOTES
[facility] clinic Follow Up Note    Blu Allen   66 year old male    Date of Visit: 2/21/2022    Chief Complaint   Patient presents with     Derm Problem     Rash on both arms, right is worse , started last W or TH, woke up with swelling under left eye     Subjective  Blu is a 66-year-old male with history of hypertension diabetes, controlled.    Patient developed a blistering itchy rash on his right forearm 5 days ago.  Over the past 2 days it has increased to his left wrist and his right upper arm.  Does not include his back or chest or legs or face.  No oral lesions.  No wheezing or throat symptoms.    Patient was working with his arm up into the [employer] insert 3 days prior to the rash.  He does remember getting some chemical on his arm and scraping his arm in the location where the rash started on his right forearm.    He denies any other new exposure that he can think of.  Has not had a rash like this before.    No fever or significant pain just some mild intermittent itching.    PMHx:    Past Medical History:   Diagnosis Date     Cancer (H) 04/2017    BCC on back     Diabetes mellitus (H)      GERD (gastroesophageal reflux disease)      High cholesterol      Hypertension      PSHx:    Past Surgical History:   Procedure Laterality Date     EXCISE GANGLION WRIST       OTHER SURGICAL HISTORY      vastectomy     PILONIDAL CYST / SINUS EXCISION       [place] LAP,PROSTATECTOMY,RADICAL,W/NERVE SPARE,INCL ROBOTIC Bilateral 08/28/2018    Procedure: ROBOTIC ASSISTED RADICAL RETROPUBIC PROSTATECTOMY BILATERAL PELVIC LYMPH NODE DISSECTION LYSIS OF ADHESIONS;  Surgeon: Abhishek Phillips MD;  Location: [facility] [place];  Service: Urology     RELEASE TRIGGER FINGER       Immunizations:   Immunization History   Administered Date(s) Administered     COVID-19,PF,Moderna 03/02/2021, 03/30/2021, 10/27/2021     Flu, Unspecified 09/27/2019, 09/09/2021     Influenza (H1N1) 02/02/2010     Influenza Vaccine IM > 6  "months Valent IIV4 (Alfuria,Fluzone) 09/27/2019     Influenza, Quad, High Dose, Pf, 65yr+ (Fluzone HD) 08/17/2020     Influenza,INJ,MDCK,PF,Quad >4yrs 10/26/2018     Pneumo Conj 13-V (2010&after) 03/21/2008, 01/05/2021     Pneumococcal 23 valent 08/29/2008     TD (ADULT, 7+) 03/02/2005     Td,adult,historic,unspecified 08/29/2008     Tdap (Adacel,Boostrix) 08/29/2008, 07/15/2016     Zoster vaccine recombinant adjuvanted (SHINGRIX) 04/20/2018, 07/25/2018       ROS A comprehensive review of systems was performed and was otherwise negative    Medications, allergies, and problem list were reviewed and updated    Exam  /60 (BP Location: Right arm, Patient Position: Sitting, Cuff Size: Adult Regular)   Pulse 76   Ht 1.695 m (5' 6.75\")   Wt 91.6 kg (202 lb)   SpO2 98%   BMI 31.88 kg/m    Lungs are clear.  Heart is regular without murmur.  His face is normal.  No conjunctivitis.    Skin on his right forearm shows a group of vesicles with surrounding erythema on his right forearm, but it is in a nondermatomal distribution centered around the area on his right forearm where he states he was up in the fireplace 3 days prior.  There is some small blisters and erythematous papules with satellite type lesions around that with a small area of blistering erythema on the right upper arm.  There is also area of blistering rash on his left wrist ventral surface.  Rashes similar to a poison ivy type rash.  There is no secondary cellulitis in the right forearm.    Assessment/Plan  1. Rash  Blistering rash in the right forearm with some satellite lesions and a small amount of blistering on the left wrist which I suspect is an id reaction.    It is in a nondermatomal distribution I do not suspect shingles.  There is no secondary infection.    I suspect a topical delayed type hypersensitivity rash from topical exposure to chemicals in the fireplace insert where he had his hand up into approximately 3 days before the " rash.    Patient was told he has been given a strong steroid cream and to not use on his face.  He should not use the steroid cream long-term.  The rash should be better in 1 week and essentially resolved in 2 weeks.    Patient was told there may be some slight worsening of the id reaction over the next 1 to 2 days but then it should be improving.  If there is severe worsening, lesions on the face, fever or worsening arm pain he should seek medical attention right away.  - triamcinolone (ARISTOCORT HP) 0.5 % external cream; Apply topically 2 times daily  Dispense: 15 g; Refill: 1    2. Type 2 diabetes mellitus without complication, without long-term current use of insulin (H)  Continue current treatment.  He was reminded to make an appointment with his primary care provider.    Hypertension controlled on Zestoretic.      Return in about 4 weeks (around 3/21/2022) for Follow up.   Patient Instructions   You have a delayed-type hypersensitivity rash of your forearm with id reaction of your left wrist.  This is likely from topical exposure on your skin to a chemical from your fireplace.  Avoid skin contact with the fireplace.    This rash is similar to a poison ivy rash.  It should clear up over the next week with steroid cream.  Use the steroid cream in areas of rash twice a day for up to 2 weeks.    You appear due for a diabetic follow-up appointment.  I would recommend follow-up appointment with your primary care physician.    Kyle Ramirez MD, MD        Current Outpatient Medications   Medication Sig Dispense Refill     aspirin 81 MG EC tablet [ASPIRIN 81 MG EC TABLET] Take 81 mg by mouth every evening.        fenofibrate (TRIGLIDE/LOFIBRA) 160 MG tablet TAKE ONE TABLET BY MOUTH ONE TIME DAILY  90 tablet 1     glipiZIDE (GLUCOTROL XL) 5 MG 24 hr tablet Take 1 tablet (5 mg) by mouth daily 90 tablet 1     lisinopril-hydrochlorothiazide (ZESTORETIC) 10-12.5 MG tablet Take 1 tablet by mouth once daily 90 tablet 3      metFORMIN (GLUCOPHAGE) 1000 MG tablet TAKE 1 TABLET BY MOUTH TWICE DAILY WITH MEALS 180 tablet 1     Multiple Vitamins-Minerals (MULTIVITAL PO)        omeprazole (PRILOSEC) 20 MG DR capsule Take 1 capsule (20 mg) by mouth daily 90 capsule 3     simvastatin (ZOCOR) 80 MG tablet [SIMVASTATIN (ZOCOR) 80 MG TABLET] TAKE 1 TABLET BY MOUTH EVERYDAY AT BEDTIME 90 tablet 3     tadalafil (CIALIS) 10 MG tablet Take 10 mg by mouth daily as needed       triamcinolone (ARISTOCORT HP) 0.5 % external cream Apply topically 2 times daily 15 g 1     No Known Allergies  Social History     Tobacco Use     Smoking status: Never Smoker     Smokeless tobacco: Never Used   Substance Use Topics     Alcohol use: Yes     Alcohol/week: 3.0 standard drinks     Types: 3 Standard drinks or equivalent per week     Drug use: No

## 2022-03-07 ENCOUNTER — TRANSFERRED RECORDS (OUTPATIENT)
Dept: HEALTH INFORMATION MANAGEMENT | Facility: CLINIC | Age: 67
End: 2022-03-07
Payer: MEDICARE

## 2022-04-12 ENCOUNTER — OFFICE VISIT (OUTPATIENT)
Dept: FAMILY MEDICINE | Facility: CLINIC | Age: 67
End: 2022-04-12
Payer: MEDICARE

## 2022-04-12 VITALS
BODY MASS INDEX: 32.14 KG/M2 | HEART RATE: 53 BPM | SYSTOLIC BLOOD PRESSURE: 118 MMHG | WEIGHT: 203.7 LBS | OXYGEN SATURATION: 98 % | DIASTOLIC BLOOD PRESSURE: 76 MMHG

## 2022-04-12 DIAGNOSIS — L72.3 SEBACEOUS CYST: ICD-10-CM

## 2022-04-12 DIAGNOSIS — C61 MALIGNANT TUMOR OF PROSTATE (H): ICD-10-CM

## 2022-04-12 DIAGNOSIS — E78.00 PRIMARY HYPERCHOLESTEROLEMIA: ICD-10-CM

## 2022-04-12 DIAGNOSIS — N18.31 TYPE 2 DIABETES MELLITUS WITH STAGE 3A CHRONIC KIDNEY DISEASE, WITHOUT LONG-TERM CURRENT USE OF INSULIN (H): Primary | ICD-10-CM

## 2022-04-12 DIAGNOSIS — N18.31 STAGE 3A CHRONIC KIDNEY DISEASE (H): ICD-10-CM

## 2022-04-12 DIAGNOSIS — E11.22 TYPE 2 DIABETES MELLITUS WITH STAGE 3A CHRONIC KIDNEY DISEASE, WITHOUT LONG-TERM CURRENT USE OF INSULIN (H): Primary | ICD-10-CM

## 2022-04-12 LAB
ALBUMIN SERPL-MCNC: 4.2 G/DL (ref 3.5–5)
ALP SERPL-CCNC: 35 U/L (ref 45–120)
ALT SERPL W P-5'-P-CCNC: 37 U/L (ref 0–45)
ANION GAP SERPL CALCULATED.3IONS-SCNC: 11 MMOL/L (ref 5–18)
AST SERPL W P-5'-P-CCNC: 27 U/L (ref 0–40)
BILIRUB SERPL-MCNC: 0.6 MG/DL (ref 0–1)
BUN SERPL-MCNC: 14 MG/DL (ref 8–22)
CALCIUM SERPL-MCNC: 10.1 MG/DL (ref 8.5–10.5)
CHLORIDE BLD-SCNC: 104 MMOL/L (ref 98–107)
CHOLEST SERPL-MCNC: 143 MG/DL
CO2 SERPL-SCNC: 26 MMOL/L (ref 22–31)
CREAT SERPL-MCNC: 1.44 MG/DL (ref 0.7–1.3)
FASTING STATUS PATIENT QL REPORTED: YES
GFR SERPL CREATININE-BSD FRML MDRD: 53 ML/MIN/1.73M2
GLUCOSE BLD-MCNC: 138 MG/DL (ref 70–125)
HBA1C MFR BLD: 6.3 % (ref 0–5.6)
HDLC SERPL-MCNC: 50 MG/DL
HGB BLD-MCNC: 14.1 G/DL (ref 13.3–17.7)
LDLC SERPL CALC-MCNC: 73 MG/DL
POTASSIUM BLD-SCNC: 4.8 MMOL/L (ref 3.5–5)
PROT SERPL-MCNC: 7.5 G/DL (ref 6–8)
SODIUM SERPL-SCNC: 141 MMOL/L (ref 136–145)
TRIGL SERPL-MCNC: 101 MG/DL

## 2022-04-12 PROCEDURE — 80053 COMPREHEN METABOLIC PANEL: CPT | Performed by: NURSE PRACTITIONER

## 2022-04-12 PROCEDURE — 99214 OFFICE O/P EST MOD 30 MIN: CPT | Performed by: NURSE PRACTITIONER

## 2022-04-12 PROCEDURE — 36415 COLL VENOUS BLD VENIPUNCTURE: CPT | Performed by: NURSE PRACTITIONER

## 2022-04-12 PROCEDURE — 85018 HEMOGLOBIN: CPT | Performed by: NURSE PRACTITIONER

## 2022-04-12 PROCEDURE — 80061 LIPID PANEL: CPT | Performed by: NURSE PRACTITIONER

## 2022-04-12 PROCEDURE — 83036 HEMOGLOBIN GLYCOSYLATED A1C: CPT | Performed by: NURSE PRACTITIONER

## 2022-04-12 RX ORDER — GLIPIZIDE 5 MG/1
5 TABLET, FILM COATED, EXTENDED RELEASE ORAL DAILY
Qty: 90 TABLET | Refills: 1 | Status: SHIPPED | OUTPATIENT
Start: 2022-04-12 | End: 2022-10-11

## 2022-04-12 ASSESSMENT — ACTIVITIES OF DAILY LIVING (ADL): CURRENT_FUNCTION: NO ASSISTANCE NEEDED

## 2022-04-12 NOTE — PROGRESS NOTES
Assessment & Plan     ICD-10-CM    1. Type 2 diabetes mellitus with stage 3a chronic kidney disease, without long-term current use of insulin (H)  E11.22 Comprehensive metabolic panel (BMP + Alb, Alk Phos, ALT, AST, Total. Bili, TP)    N18.31 Hemoglobin A1c     metFORMIN (GLUCOPHAGE) 1000 MG tablet     glipiZIDE (GLUCOTROL XL) 5 MG 24 hr tablet     Comprehensive metabolic panel (BMP + Alb, Alk Phos, ALT, AST, Total. Bili, TP)     Hemoglobin A1c   2. Sebaceous cyst  L72.3 Adult General Surg Referral   3. Stage 3a chronic kidney disease (H)  N18.31 Hemoglobin     Hemoglobin   4. Primary hypercholesterolemia  E78.00 Lipid panel reflex to direct LDL Fasting     Lipid panel reflex to direct LDL Fasting   5. Malignant tumor of prostate (H)  C61      Update diabetic labs.  Recheck renal function.  Continue same metformin and glipizide.  Reviewed with the patient different avenues for addressing the knee discomfort.  No red flags on exam.  He would like to start exercising on his bike and if still bothering him, let me know and we can do an x-ray/some physical therapy.  Continue follow-up with urology for management of prostate cancer history.  Continue same antihypertensives.  Given recurrent cyst, general surgery referral placed.    Subjective     HPI     Back lump.    Removed by family medicine and then Dr. Bañuelos. Started coming back over a year ago. No big pain.  No drainage. Wife noticed a little bit of redness.     T2DM  Last a1c improved from 8.0 to 5.9..  Has had a couple episodes where he felt low.  Not checking sugars lately.  Did test once with a low episode but cant remember specifics about it.       Prostate CA  Still checking PSA every 6 months through urology.    Right knee pain  Started after kneeling down. Pain on the lateral knee.  Has happened a couple years ago. No numbness or tingling.  Pain 8/10 when it flares.  Feels like a burning sensation. No swelling. No clicking or catching. Stairs are ok.  No  instability.  This all started flaring up about a month ago.          Review of Systems - negative except for what's listed in the HPI      Objective    /76   Pulse 53   Wt 92.4 kg (203 lb 11.2 oz)   SpO2 98%   BMI 32.14 kg/m    Physical Exam   General appearance - alert, well appearing, and in no distress  Mental status - alert, oriented to person, place, and time  Lymphatics - no palpable lymphadenopathy  Chest - clear to auscultation, no wheezes, rales or rhonchi, symmetric air entry  Heart - normal rate and regular rhythm, S1 and S2 normal, no murmurs noted  Abdomen - soft, nontender, nondistended, no masses or organomegaly  Neurological - alert, oriented, normal speech, no focal findings or movement disorder noted, neck supple without rigidity, cranial nerves II through XII intact, motor and sensory grossly normal bilaterally, normal muscle tone, no tremors, strength 5/5  Musculoskeletal -gait steady.  Patella firm and intact on the right knee.  No crepitus with flexion or extension.  Negative anterior/posterior drawer test.  No pain or instability with varus/valgus pressure  Extremities - peripheral pulses normal, no peripheral edema  Skin -mobile lump over the thoracic spine measuring approximately 1 cm in diameter.  No erythema.  Feels consistent with a cyst    Van Schulz, CNP    This note has been dictated using voice recognition software. Any grammatical or context distortions are unintentional and inherent to the software.

## 2022-04-12 NOTE — PATIENT INSTRUCTIONS
Updating diabetic labs today.    The knee pain could be multiple things.  Like we discussed, if this does start to become more bothersome especially with increasing activity, let me know and we can start investigating.    Given that this cyst keeps returning, it is time to meet with general surgery again to try to get it fully excised.  Referral placed.

## 2022-05-02 DIAGNOSIS — E78.00 PURE HYPERCHOLESTEROLEMIA: ICD-10-CM

## 2022-05-05 RX ORDER — FENOFIBRATE 160 MG/1
TABLET ORAL
Qty: 90 TABLET | Refills: 3 | Status: SHIPPED | OUTPATIENT
Start: 2022-05-05 | End: 2022-10-10

## 2022-05-05 NOTE — TELEPHONE ENCOUNTER
"Last Written Prescription Date:  10/19/21  Last Fill Quantity: 90,  # refills: 1   Last office visit provider:  4/12/22     Requested Prescriptions   Pending Prescriptions Disp Refills     fenofibrate (TRIGLIDE/LOFIBRA) 160 MG tablet [Pharmacy Med Name: Fenofibrate Oral Tablet 160 MG] 90 tablet 0     Sig: TAKE ONE TABLET BY MOUTH ONE TIME DAILY       Fibrates Passed - 5/5/2022 11:35 AM        Passed - Lipid panel on file in past 12 months     Recent Labs   Lab Test 04/12/22  1027   CHOL 143   TRIG 101   HDL 50   LDL 73               Passed - No abnormal creatine kinase in past 12 months     No lab results found.             Passed - Recent (12 mo) or future (30 days) visit within the authorizing provider's specialty     Patient has had an office visit with the authorizing provider or a provider within the authorizing providers department within the previous 12 mos or has a future within next 30 days. See \"Patient Info\" tab in inbasket, or \"Choose Columns\" in Meds & Orders section of the refill encounter.              Passed - Medication is active on med list        Passed - Patient is age 18 or older             Luke Hardwick RN 05/05/22 11:35 AM    "

## 2022-05-09 ENCOUNTER — OFFICE VISIT (OUTPATIENT)
Dept: SURGERY | Facility: CLINIC | Age: 67
End: 2022-05-09
Attending: NURSE PRACTITIONER
Payer: MEDICARE

## 2022-05-09 VITALS — WEIGHT: 203.4 LBS | HEIGHT: 67 IN | BODY MASS INDEX: 31.92 KG/M2

## 2022-05-09 DIAGNOSIS — L72.3 SEBACEOUS CYST: ICD-10-CM

## 2022-05-09 PROCEDURE — 99203 OFFICE O/P NEW LOW 30 MIN: CPT | Performed by: SURGERY

## 2022-05-09 NOTE — LETTER
5/9/2022         RE: Blu Allen  [address]  [facility] [address]        Dear Colleague,    Thank you for referring your patient, Blu Allen, to the [facility] [place]. Please see a copy of my visit note below.    HPI: Blu Allen is a 66 year old male referred to see me by Van Schluz for a lump in the mid back region.  He notes  a several month history of the lump and actually had a cutaneous malignancy removed 5 years ago in the same exact location.  He denies any nausea, vomiting, fevers, chills or any other symptoms at present.       Allergies:Patient has no known allergies.    Past Medical History:   Diagnosis Date     Cancer (H) 04/2017    BCC on back     Diabetes mellitus (H)      GERD (gastroesophageal reflux disease)      High cholesterol      Hypertension        Past Surgical History:   Procedure Laterality Date     EXCISE GANGLION WRIST       OTHER SURGICAL HISTORY      vastectomy     PILONIDAL CYST / SINUS EXCISION       [place] LAP,PROSTATECTOMY,RADICAL,W/NERVE SPARE,INCL ROBOTIC Bilateral 08/28/2018    Procedure: ROBOTIC ASSISTED RADICAL RETROPUBIC PROSTATECTOMY BILATERAL PELVIC LYMPH NODE DISSECTION LYSIS OF ADHESIONS;  Surgeon: Abhishek Phillips MD;  Location: [facility] [place];  Service: Urology     RELEASE TRIGGER FINGER         CURRENT MEDS:    Current Outpatient Medications:      aspirin 81 MG EC tablet, [ASPIRIN 81 MG EC TABLET] Take 81 mg by mouth every evening. , Disp: , Rfl:      fenofibrate (TRIGLIDE/LOFIBRA) 160 MG tablet, TAKE ONE TABLET BY MOUTH ONE TIME DAILY, Disp: 90 tablet, Rfl: 3     glipiZIDE (GLUCOTROL XL) 5 MG 24 hr tablet, Take 1 tablet (5 mg) by mouth in the morning., Disp: 90 tablet, Rfl: 1     lisinopril-hydrochlorothiazide (ZESTORETIC) 10-12.5 MG tablet, Take 1 tablet by mouth once daily, Disp: 90 tablet, Rfl: 3     metFORMIN (GLUCOPHAGE) 1000 MG tablet, TAKE 1 TABLET BY MOUTH TWICE DAILY WITH MEALS, Disp:  "180 tablet, Rfl: 1     Multiple Vitamins-Minerals (MULTIVITAL PO), , Disp: , Rfl:      omeprazole (PRILOSEC) 20 MG DR capsule, Take 1 capsule (20 mg) by mouth daily, Disp: 90 capsule, Rfl: 3     simvastatin (ZOCOR) 80 MG tablet, [SIMVASTATIN (ZOCOR) 80 MG TABLET] TAKE 1 TABLET BY MOUTH EVERYDAY AT BEDTIME, Disp: 90 tablet, Rfl: 3     tadalafil (CIALIS) 10 MG tablet, Take 10 mg by mouth daily as needed, Disp: , Rfl:     Family History   Problem Relation Age of Onset     Lung Cancer Father      Diabetes Mother      No Known Problems Sister      No Known Problems Sister      No Known Problems Sister      Stomach Cancer Brother      No Known Problems Brother      No Known Problems Brother      No Known Problems Brother         reports that he has never smoked. He has never used smokeless tobacco. He reports current alcohol use of about 3.0 standard drinks of alcohol per week. He reports that he does not use drugs.    Review of Systems:  The 12 system review is within normal limits except for as mentioned above.  General ROS: No complaints or constitutional symptoms  Ophthalmic ROS: No complaints of visual changes  Skin: As per HPI  Endocrine: No complaints or symptoms  Hematologic/Lymphatic: No symptoms or complaints  Psychiatric: No symptoms or complaints  Respiratory ROS: no cough, shortness of breath, or wheezing  Cardiovascular ROS: no chest pain or dyspnea on exertion  Gastrointestinal ROS: No complaints of pain or other symptoms  Genito-Urinary ROS: no dysuria, trouble voiding, or hematuria  Musculoskeletal ROS: no joint or muscle pain  Neurological ROS: no TIA or stroke symptoms      EXAM:  Ht 1.695 m (5' 6.75\")   Wt 92.3 kg (203 lb 6.4 oz)   BMI 32.10 kg/m    GENERAL: Well developed male, No acute distress, pleasant and conversant   EYES: Pupils equal, round and reactive, no scleral icterus  ABDOMEN: Soft, non tender, no masses  SKIN: Pink, warm and dry-back-1 cm soft cystic lesion consistent with epidermal " inclusion cyst no surrounding changes concerning for malignancy  NEURO:No focal deficits, ambulatory  MUSCULOSKELETAL:No obvious deformities, no swelling, normal appearing      LABS:  Lab Results   Component Value Date    WBC 7.1 01/05/2021    HGB 14.1 04/12/2022    HCT 44.4 01/05/2021    MCV 96 01/05/2021     01/05/2021     INR/Prothrombin Time  @LABRCNTIP(NA,K,CL,co2,bun,creatinine,labglom,glucose,calcium)@  Lab Results   Component Value Date    ALT 37 04/12/2022    AST 27 04/12/2022    ALKPHOS 35 (L) 04/12/2022           Assessment/Plan:   Blu Allen is a 66 year old male with signs and symptoms consistent with a likely inclusion cyst around the area of previous cutaneous malignancy excision.  I have explained the pathophysiology of cysts in detail as well as the surgical versus non-operative management strategies.      The risks of surgery were discussed in detail which include, but are not limited to, bleeding, infection, blood clots, stroke, heart attack and death.  Additionally, the risks of non operative management were discussed which include, but are not limited to,enlargin of the mass, infection and pain.      He understands everything which was discussed and has consented to proceed with an excision of the mass.  We will schedule surgery accordingly.       Rj Bañuelos DO [facility]  [phone]  [facility] Department of Surgery      Again, thank you for allowing me to participate in the care of your patient.        Sincerely,        Rj Bañuelos DO

## 2022-05-09 NOTE — PROGRESS NOTES
HPI: Blu Allen is a 66 year old male referred to see me by Van Schulz for a lump in the mid back region.  He notes  a several month history of the lump and actually had a cutaneous malignancy removed 5 years ago in the same exact location.  He denies any nausea, vomiting, fevers, chills or any other symptoms at present.       Allergies:Patient has no known allergies.    Past Medical History:   Diagnosis Date     Cancer (H) 04/2017    BCC on back     Diabetes mellitus (H)      GERD (gastroesophageal reflux disease)      High cholesterol      Hypertension        Past Surgical History:   Procedure Laterality Date     EXCISE GANGLION WRIST       OTHER SURGICAL HISTORY      vastectomy     PILONIDAL CYST / SINUS EXCISION       [place] LAP,PROSTATECTOMY,RADICAL,W/NERVE SPARE,INCL ROBOTIC Bilateral 08/28/2018    Procedure: ROBOTIC ASSISTED RADICAL RETROPUBIC PROSTATECTOMY BILATERAL PELVIC LYMPH NODE DISSECTION LYSIS OF ADHESIONS;  Surgeon: Abhishek Phillips MD;  Location: [facility];  Service: Urology     RELEASE TRIGGER FINGER         CURRENT MEDS:    Current Outpatient Medications:      aspirin 81 MG EC tablet, [ASPIRIN 81 MG EC TABLET] Take 81 mg by mouth every evening. , Disp: , Rfl:      fenofibrate (TRIGLIDE/LOFIBRA) 160 MG tablet, TAKE ONE TABLET BY MOUTH ONE TIME DAILY, Disp: 90 tablet, Rfl: 3     glipiZIDE (GLUCOTROL XL) 5 MG 24 hr tablet, Take 1 tablet (5 mg) by mouth in the morning., Disp: 90 tablet, Rfl: 1     lisinopril-hydrochlorothiazide (ZESTORETIC) 10-12.5 MG tablet, Take 1 tablet by mouth once daily, Disp: 90 tablet, Rfl: 3     metFORMIN (GLUCOPHAGE) 1000 MG tablet, TAKE 1 TABLET BY MOUTH TWICE DAILY WITH MEALS, Disp: 180 tablet, Rfl: 1     Multiple Vitamins-Minerals (MULTIVITAL PO), , Disp: , Rfl:      omeprazole (PRILOSEC) 20 MG DR capsule, Take 1 capsule (20 mg) by mouth daily, Disp: 90 capsule, Rfl: 3     simvastatin (ZOCOR) 80 MG tablet, [SIMVASTATIN (ZOCOR) 80 MG TABLET] TAKE 1 TABLET  "BY MOUTH EVERYDAY AT BEDTIME, Disp: 90 tablet, Rfl: 3     tadalafil (CIALIS) 10 MG tablet, Take 10 mg by mouth daily as needed, Disp: , Rfl:     Family History   Problem Relation Age of Onset     Lung Cancer Father      Diabetes Mother      No Known Problems Sister      No Known Problems Sister      No Known Problems Sister      Stomach Cancer Brother      No Known Problems Brother      No Known Problems Brother      No Known Problems Brother         reports that he has never smoked. He has never used smokeless tobacco. He reports current alcohol use of about 3.0 standard drinks of alcohol per week. He reports that he does not use drugs.    Review of Systems:  The 12 system review is within normal limits except for as mentioned above.  General ROS: No complaints or constitutional symptoms  Ophthalmic ROS: No complaints of visual changes  Skin: As per HPI  Endocrine: No complaints or symptoms  Hematologic/Lymphatic: No symptoms or complaints  Psychiatric: No symptoms or complaints  Respiratory ROS: no cough, shortness of breath, or wheezing  Cardiovascular ROS: no chest pain or dyspnea on exertion  Gastrointestinal ROS: No complaints of pain or other symptoms  Genito-Urinary ROS: no dysuria, trouble voiding, or hematuria  Musculoskeletal ROS: no joint or muscle pain  Neurological ROS: no TIA or stroke symptoms      EXAM:  Ht 1.695 m (5' 6.75\")   Wt 92.3 kg (203 lb 6.4 oz)   BMI 32.10 kg/m    GENERAL: Well developed male, No acute distress, pleasant and conversant   EYES: Pupils equal, round and reactive, no scleral icterus  ABDOMEN: Soft, non tender, no masses  SKIN: Pink, warm and dry-back-1 cm soft cystic lesion consistent with epidermal inclusion cyst no surrounding changes concerning for malignancy  NEURO:No focal deficits, ambulatory  MUSCULOSKELETAL:No obvious deformities, no swelling, normal appearing      LABS:  Lab Results   Component Value Date    WBC 7.1 01/05/2021    HGB 14.1 04/12/2022    HCT 44.4 " 01/05/2021    MCV 96 01/05/2021     01/05/2021     INR/Prothrombin Time  @LABRCNTIP(NA,K,CL,co2,bun,creatinine,labglom,glucose,calcium)@  Lab Results   Component Value Date    ALT 37 04/12/2022    AST 27 04/12/2022    ALKPHOS 35 (L) 04/12/2022           Assessment/Plan:   Blu Allen is a 66 year old male with signs and symptoms consistent with a likely inclusion cyst around the area of previous cutaneous malignancy excision.  I have explained the pathophysiology of cysts in detail as well as the surgical versus non-operative management strategies.      The risks of surgery were discussed in detail which include, but are not limited to, bleeding, infection, blood clots, stroke, heart attack and death.  Additionally, the risks of non operative management were discussed which include, but are not limited to,enlargin of the mass, infection and pain.      He understands everything which was discussed and has consented to proceed with an excision of the mass.  We will schedule surgery accordingly.       Rj Bañuelos, DO [facility]  [phone]  [facility] Department of Surgery

## 2022-05-28 DIAGNOSIS — Z11.59 ENCOUNTER FOR SCREENING FOR OTHER VIRAL DISEASES: Primary | ICD-10-CM

## 2022-06-03 ENCOUNTER — LAB (OUTPATIENT)
Dept: LAB | Facility: CLINIC | Age: 67
End: 2022-06-03
Payer: MEDICARE

## 2022-06-03 DIAGNOSIS — Z11.59 ENCOUNTER FOR SCREENING FOR OTHER VIRAL DISEASES: ICD-10-CM

## 2022-06-03 LAB — SARS-COV-2 RNA RESP QL NAA+PROBE: NEGATIVE

## 2022-06-03 PROCEDURE — U0005 INFEC AGEN DETEC AMPLI PROBE: HCPCS

## 2022-06-03 PROCEDURE — U0003 INFECTIOUS AGENT DETECTION BY NUCLEIC ACID (DNA OR RNA); SEVERE ACUTE RESPIRATORY SYNDROME CORONAVIRUS 2 (SARS-COV-2) (CORONAVIRUS DISEASE [COVID-19]), AMPLIFIED PROBE TECHNIQUE, MAKING USE OF HIGH THROUGHPUT TECHNOLOGIES AS DESCRIBED BY CMS-2020-01-R: HCPCS

## 2022-06-06 ENCOUNTER — ANESTHESIA EVENT (OUTPATIENT)
Dept: SURGERY | Facility: AMBULATORY SURGERY CENTER | Age: 67
End: 2022-06-06
Payer: MEDICARE

## 2022-06-07 ENCOUNTER — ANESTHESIA (OUTPATIENT)
Dept: SURGERY | Facility: AMBULATORY SURGERY CENTER | Age: 67
End: 2022-06-07
Payer: MEDICARE

## 2022-06-07 ENCOUNTER — HOSPITAL ENCOUNTER (OUTPATIENT)
Facility: AMBULATORY SURGERY CENTER | Age: 67
Discharge: HOME | End: 2022-06-07
Attending: SURGERY
Payer: MEDICARE

## 2022-06-07 VITALS
OXYGEN SATURATION: 93 % | HEIGHT: 67 IN | BODY MASS INDEX: 31.39 KG/M2 | RESPIRATION RATE: 16 BRPM | WEIGHT: 200 LBS | TEMPERATURE: 97.3 F | SYSTOLIC BLOOD PRESSURE: 164 MMHG | HEART RATE: 45 BPM | DIASTOLIC BLOOD PRESSURE: 88 MMHG

## 2022-06-07 DIAGNOSIS — L72.3 SEBACEOUS CYST: ICD-10-CM

## 2022-06-07 LAB
GLUCOSE BLDC GLUCOMTR-MCNC: 139 MG/DL (ref 70–99)
GLUCOSE POCT: 134 MG/DL (ref 70–99)

## 2022-06-07 PROCEDURE — 11401 EXC TR-EXT B9+MARG 0.6-1 CM: CPT | Performed by: SURGERY

## 2022-06-07 RX ORDER — DEXAMETHASONE SODIUM PHOSPHATE 4 MG/ML
INJECTION, SOLUTION INTRA-ARTICULAR; INTRALESIONAL; INTRAMUSCULAR; INTRAVENOUS; SOFT TISSUE PRN
Status: DISCONTINUED | OUTPATIENT
Start: 2022-06-07 | End: 2022-06-07

## 2022-06-07 RX ORDER — FENTANYL CITRATE 0.05 MG/ML
25 INJECTION, SOLUTION INTRAMUSCULAR; INTRAVENOUS EVERY 5 MIN PRN
Status: DISCONTINUED | OUTPATIENT
Start: 2022-06-07 | End: 2022-06-08 | Stop reason: HOSPADM

## 2022-06-07 RX ORDER — CEFAZOLIN SODIUM 2 G/100ML
2 INJECTION, SOLUTION INTRAVENOUS SEE ADMIN INSTRUCTIONS
Status: DISCONTINUED | OUTPATIENT
Start: 2022-06-07 | End: 2022-06-08 | Stop reason: HOSPADM

## 2022-06-07 RX ORDER — ONDANSETRON 4 MG/1
4 TABLET, ORALLY DISINTEGRATING ORAL EVERY 30 MIN PRN
Status: DISCONTINUED | OUTPATIENT
Start: 2022-06-07 | End: 2022-06-08 | Stop reason: HOSPADM

## 2022-06-07 RX ORDER — SODIUM CHLORIDE, SODIUM LACTATE, POTASSIUM CHLORIDE, CALCIUM CHLORIDE 600; 310; 30; 20 MG/100ML; MG/100ML; MG/100ML; MG/100ML
INJECTION, SOLUTION INTRAVENOUS CONTINUOUS
Status: DISCONTINUED | OUTPATIENT
Start: 2022-06-07 | End: 2022-06-08 | Stop reason: HOSPADM

## 2022-06-07 RX ORDER — ONDANSETRON 2 MG/ML
INJECTION INTRAMUSCULAR; INTRAVENOUS PRN
Status: DISCONTINUED | OUTPATIENT
Start: 2022-06-07 | End: 2022-06-07

## 2022-06-07 RX ORDER — BUPIVACAINE HYDROCHLORIDE AND EPINEPHRINE 2.5; 5 MG/ML; UG/ML
INJECTION, SOLUTION INFILTRATION; PERINEURAL PRN
Status: DISCONTINUED | OUTPATIENT
Start: 2022-06-07 | End: 2022-06-07 | Stop reason: HOSPADM

## 2022-06-07 RX ORDER — PROPOFOL 10 MG/ML
INJECTION, EMULSION INTRAVENOUS PRN
Status: DISCONTINUED | OUTPATIENT
Start: 2022-06-07 | End: 2022-06-07

## 2022-06-07 RX ORDER — GLYCOPYRROLATE 0.2 MG/ML
INJECTION, SOLUTION INTRAMUSCULAR; INTRAVENOUS PRN
Status: DISCONTINUED | OUTPATIENT
Start: 2022-06-07 | End: 2022-06-07

## 2022-06-07 RX ORDER — LIDOCAINE HYDROCHLORIDE 20 MG/ML
INJECTION, SOLUTION INFILTRATION; PERINEURAL PRN
Status: DISCONTINUED | OUTPATIENT
Start: 2022-06-07 | End: 2022-06-07

## 2022-06-07 RX ORDER — PROPOFOL 10 MG/ML
INJECTION, EMULSION INTRAVENOUS CONTINUOUS PRN
Status: DISCONTINUED | OUTPATIENT
Start: 2022-06-07 | End: 2022-06-07

## 2022-06-07 RX ORDER — CEFAZOLIN SODIUM 2 G/100ML
2 INJECTION, SOLUTION INTRAVENOUS
Status: COMPLETED | OUTPATIENT
Start: 2022-06-07 | End: 2022-06-07

## 2022-06-07 RX ORDER — FENTANYL CITRATE 50 UG/ML
INJECTION, SOLUTION INTRAMUSCULAR; INTRAVENOUS PRN
Status: DISCONTINUED | OUTPATIENT
Start: 2022-06-07 | End: 2022-06-07

## 2022-06-07 RX ORDER — MEPERIDINE HYDROCHLORIDE 25 MG/ML
12.5 INJECTION INTRAMUSCULAR; INTRAVENOUS; SUBCUTANEOUS
Status: DISCONTINUED | OUTPATIENT
Start: 2022-06-07 | End: 2022-06-08 | Stop reason: HOSPADM

## 2022-06-07 RX ORDER — OXYCODONE HYDROCHLORIDE 5 MG/1
5 TABLET ORAL EVERY 4 HOURS PRN
Status: DISCONTINUED | OUTPATIENT
Start: 2022-06-07 | End: 2022-06-08 | Stop reason: HOSPADM

## 2022-06-07 RX ORDER — FENTANYL CITRATE 0.05 MG/ML
25 INJECTION, SOLUTION INTRAMUSCULAR; INTRAVENOUS
Status: DISCONTINUED | OUTPATIENT
Start: 2022-06-07 | End: 2022-06-08 | Stop reason: HOSPADM

## 2022-06-07 RX ORDER — KETOROLAC TROMETHAMINE 30 MG/ML
INJECTION, SOLUTION INTRAMUSCULAR; INTRAVENOUS PRN
Status: DISCONTINUED | OUTPATIENT
Start: 2022-06-07 | End: 2022-06-07

## 2022-06-07 RX ORDER — ONDANSETRON 2 MG/ML
4 INJECTION INTRAMUSCULAR; INTRAVENOUS EVERY 30 MIN PRN
Status: DISCONTINUED | OUTPATIENT
Start: 2022-06-07 | End: 2022-06-08 | Stop reason: HOSPADM

## 2022-06-07 RX ORDER — HYDROMORPHONE HCL IN WATER/PF 6 MG/30 ML
0.2 PATIENT CONTROLLED ANALGESIA SYRINGE INTRAVENOUS EVERY 5 MIN PRN
Status: DISCONTINUED | OUTPATIENT
Start: 2022-06-07 | End: 2022-06-08 | Stop reason: HOSPADM

## 2022-06-07 RX ORDER — LIDOCAINE 40 MG/G
CREAM TOPICAL
Status: DISCONTINUED | OUTPATIENT
Start: 2022-06-07 | End: 2022-06-08 | Stop reason: HOSPADM

## 2022-06-07 RX ADMIN — GLYCOPYRROLATE 0.2 MG: 0.2 INJECTION, SOLUTION INTRAMUSCULAR; INTRAVENOUS at 12:21

## 2022-06-07 RX ADMIN — FENTANYL CITRATE 50 MCG: 50 INJECTION, SOLUTION INTRAMUSCULAR; INTRAVENOUS at 12:14

## 2022-06-07 RX ADMIN — SODIUM CHLORIDE, SODIUM LACTATE, POTASSIUM CHLORIDE, CALCIUM CHLORIDE: 600; 310; 30; 20 INJECTION, SOLUTION INTRAVENOUS at 12:50

## 2022-06-07 RX ADMIN — SODIUM CHLORIDE, SODIUM LACTATE, POTASSIUM CHLORIDE, CALCIUM CHLORIDE: 600; 310; 30; 20 INJECTION, SOLUTION INTRAVENOUS at 12:08

## 2022-06-07 RX ADMIN — DEXAMETHASONE SODIUM PHOSPHATE 10 MG: 4 INJECTION, SOLUTION INTRA-ARTICULAR; INTRALESIONAL; INTRAMUSCULAR; INTRAVENOUS; SOFT TISSUE at 12:17

## 2022-06-07 RX ADMIN — ONDANSETRON 4 MG: 2 INJECTION INTRAMUSCULAR; INTRAVENOUS at 12:17

## 2022-06-07 RX ADMIN — PROPOFOL 180 MCG/KG/MIN: 10 INJECTION, EMULSION INTRAVENOUS at 12:17

## 2022-06-07 RX ADMIN — KETOROLAC TROMETHAMINE 15 MG: 30 INJECTION, SOLUTION INTRAMUSCULAR; INTRAVENOUS at 12:44

## 2022-06-07 RX ADMIN — CEFAZOLIN SODIUM 2 G: 2 INJECTION, SOLUTION INTRAVENOUS at 12:14

## 2022-06-07 RX ADMIN — PROPOFOL 50 MG: 10 INJECTION, EMULSION INTRAVENOUS at 12:17

## 2022-06-07 RX ADMIN — LIDOCAINE HYDROCHLORIDE 2 ML: 20 INJECTION, SOLUTION INFILTRATION; PERINEURAL at 12:14

## 2022-06-07 NOTE — PROGRESS NOTES
Patient properly using IS at regular intervals.  Oxygen remains above 90% on room air while in phase II.  Patient denies any dizziness or feeling light-headed.  Instructed patient to continue to use the IS at home.  Ambulated in [place] to bathroom without difficulty.  Minerva Reveles RN on 6/7/2022 at 3:05 PM

## 2022-06-07 NOTE — ANESTHESIA POSTPROCEDURE EVALUATION
Patient: Blu Allen    Procedure: Procedure(s):  EXCISION, MASS, TORSO- back       Anesthesia Type:  MAC    Note:  Disposition: Outpatient   Postop Pain Control: Uneventful            Sign Out: Well controlled pain   PONV: No   Neuro/Psych: Uneventful            Sign Out: Acceptable/Baseline neuro status   Airway/Respiratory: Uneventful            Sign Out: Acceptable/Baseline resp. status   CV/Hemodynamics: Uneventful            Sign Out: Acceptable CV status; No obvious hypovolemia; No obvious fluid overload   Other NRE: NONE   DID A NON-ROUTINE EVENT OCCUR? No           Last vitals:  Vitals Value Taken Time   /84 06/07/22 1401   Temp 97.6  F (36.4  C) 06/07/22 1257   Pulse 45 06/07/22 1401   Resp 16 06/07/22 1400   SpO2 98 % 06/07/22 1401       Electronically Signed By: Blu Ponce MD  June 7, 2022  3:13 PM

## 2022-06-07 NOTE — ANESTHESIA PREPROCEDURE EVALUATION
Anesthesia Pre-Procedure Evaluation    Patient: Blu Allen   MRN: [id number] : 1955        Procedure : Procedure(s):  EXCISION, MASS, TORSO- back          Past Medical History:   Diagnosis Date     Cancer (H) 2017    BCC on back     Diabetes mellitus (H)      GERD (gastroesophageal reflux disease)      High cholesterol      Hypertension       Past Surgical History:   Procedure Laterality Date     EXCISE GANGLION WRIST       OTHER SURGICAL HISTORY      vastectomy     PILONIDAL CYST / SINUS EXCISION       [place] LAP,PROSTATECTOMY,RADICAL,W/NERVE SPARE,INCL ROBOTIC Bilateral 2018    Procedure: ROBOTIC ASSISTED RADICAL RETROPUBIC PROSTATECTOMY BILATERAL PELVIC LYMPH NODE DISSECTION LYSIS OF ADHESIONS;  Surgeon: Abhishek Phillips MD;  Location: [facility] [place];  Service: Urology     RELEASE TRIGGER FINGER        No Known Allergies   Social History     Tobacco Use     Smoking status: Never Smoker     Smokeless tobacco: Never Used   Substance Use Topics     Alcohol use: Yes     Alcohol/week: 3.0 standard drinks     Types: 3 Standard drinks or equivalent per week     Comment: 2 drinks/ month      Wt Readings from Last 1 Encounters:   22 90.7 kg (200 lb)        Anesthesia Evaluation            ROS/MED HX  ENT/Pulmonary:  - neg pulmonary ROS     Neurologic:  - neg neurologic ROS     Cardiovascular:     (+) hypertension-----    METS/Exercise Tolerance:     Hematologic:  - neg hematologic  ROS     Musculoskeletal:  - neg musculoskeletal ROS     GI/Hepatic:     (+) GERD, Asymptomatic on medication,     Renal/Genitourinary:     (+) renal disease (stage 3), type: CRI,     Endo:     (+) type II DM, Last HgA1c: 6.3, Obesity,     Psychiatric/Substance Use:  - neg psychiatric ROS     Infectious Disease:  - neg infectious disease ROS     Malignancy:   (+) Malignancy, History of Prostate.    Other:  - neg other ROS          Physical Exam    Airway  airway exam normal      Mallampati: II       Respiratory  Devices and Support         Dental  no notable dental history         Cardiovascular   cardiovascular exam normal       Rhythm and rate: regular and normal     Pulmonary   pulmonary exam normal        breath sounds clear to auscultation           OUTSIDE LABS:  CBC:   Lab Results   Component Value Date    WBC 7.1 01/05/2021    WBC 6.1 07/09/2019    HGB 14.1 04/12/2022    HGB 14.9 01/05/2021    HCT 44.4 01/05/2021    HCT 41.7 07/09/2019     01/05/2021     07/09/2019     BMP:   Lab Results   Component Value Date     04/12/2022     11/19/2021    POTASSIUM 4.8 04/12/2022    POTASSIUM 4.6 11/19/2021    CHLORIDE 104 04/12/2022    CHLORIDE 105 11/19/2021    CO2 26 04/12/2022    CO2 26 11/19/2021    BUN 14 04/12/2022    BUN 14 11/19/2021    CR 1.44 (H) 04/12/2022    CR 1.34 (H) 11/19/2021     (A) 06/07/2022     (H) 04/12/2022     COAGS: No results found for: PTT, INR, FIBR  POC: No results found for: BGM, HCG, HCGS  HEPATIC:   Lab Results   Component Value Date    ALBUMIN 4.2 04/12/2022    PROTTOTAL 7.5 04/12/2022    ALT 37 04/12/2022    AST 27 04/12/2022    ALKPHOS 35 (L) 04/12/2022    BILITOTAL 0.6 04/12/2022     OTHER:   Lab Results   Component Value Date    A1C 6.3 (H) 04/12/2022    JR 10.1 04/12/2022       Anesthesia Plan    ASA Status:  2      Anesthesia Type: MAC.   Induction: Propofol.   Maintenance: TIVA.        Consents    Anesthesia Plan(s) and associated risks, benefits, and realistic alternatives discussed. Questions answered and patient/representative(s) expressed understanding.    - Discussed:     - Discussed with:  Patient      - Extended Intubation/Ventilatory Support Discussed: No.      - Patient is DNR/DNI Status: No    Use of blood products discussed: No .     Postoperative Care    Pain management: IV analgesics.   PONV prophylaxis: Ondansetron (or other 5HT-3), Dexamethasone or Solumedrol     Comments:    Other Comments: The patient understands and accepts the  risks of MAC anesthesia including (but not limited to) nausea, vomiting, dizziness, and chipped teeth. I also discussed the possibility of conversion to GAETT/GALMA anesthesia which include hoarse voice, sore throat, and pinched lip or chipped teeth.  Versed/fent  propofol ggt  Decadron/zoan            Blu Ponce MD

## 2022-06-07 NOTE — DISCHARGE INSTRUCTIONS
If you have any questions or concerns regarding your procedure, please contact Dr. Bañuelos, his office number is [phone].      You received a dose of IV Toradol at 12:45pm. Please do not take any additional Ibuprofen products (Aleve/Advil/Motrin/Naproxen/Celebrex) until after 6:45pm

## 2022-06-07 NOTE — ANESTHESIA CARE TRANSFER NOTE
Patient: Blu Allen    Procedure: Procedure(s):  EXCISION, MASS, TORSO- back       Diagnosis: Sebaceous cyst [L72.3]  Diagnosis Additional Information: No value filed.    Anesthesia Type:   MAC     Note:      Level of Consciousness: drowsy  Oxygen Supplementation: face mask  Level of Supplemental Oxygen (L/min / FiO2): 8  Independent Airway: airway patency satisfactory and stable  Dentition: dentition unchanged  Vital Signs Stable: post-procedure vital signs reviewed and stable  Report to RN Given: handoff report given  Patient transferred to: PACU    Handoff Report: Identifed the Patient, Identified the Reponsible Provider, Reviewed the pertinent medical history, Discussed the surgical course, Reviewed Intra-OP anesthesia mangement and issues during anesthesia, Set expectations for post-procedure period and Allowed opportunity for questions and acknowledgement of understanding      Vitals:  Vitals Value Taken Time   /71 06/07/22 1255   Temp     Pulse 56 06/07/22 1255   Resp     SpO2 97 % 06/07/22 1255   Vitals shown include unvalidated device data.    Electronically Signed By: CJ Pink CRNA  June 7, 2022  12:59 PM

## 2022-06-07 NOTE — H&P
HPI  66 year old year old male who presents for excision of back cyst    Allergies:Patient has no known allergies.    Past Medical History:   Diagnosis Date     Cancer (H) 04/01/2017    BCC on back     Diabetes mellitus (H)      GERD (gastroesophageal reflux disease)      High cholesterol      Hypertension        Past Surgical History:   Procedure Laterality Date     EXCISE GANGLION WRIST       OTHER SURGICAL HISTORY      vastectomy     PILONIDAL CYST / SINUS EXCISION       [place] LAP,PROSTATECTOMY,RADICAL,W/NERVE SPARE,INCL ROBOTIC Bilateral 08/28/2018    Procedure: ROBOTIC ASSISTED RADICAL RETROPUBIC PROSTATECTOMY BILATERAL PELVIC LYMPH NODE DISSECTION LYSIS OF ADHESIONS;  Surgeon: Abhishek Phillips MD;  Location: [facility];  Service: Urology     RELEASE TRIGGER FINGER           CURRENT MEDS:    Current Outpatient Medications:      aspirin 81 MG EC tablet, [ASPIRIN 81 MG EC TABLET] Take 81 mg by mouth every evening. , Disp: , Rfl:      fenofibrate (TRIGLIDE/LOFIBRA) 160 MG tablet, TAKE ONE TABLET BY MOUTH ONE TIME DAILY, Disp: 90 tablet, Rfl: 3     glipiZIDE (GLUCOTROL XL) 5 MG 24 hr tablet, Take 1 tablet (5 mg) by mouth in the morning., Disp: 90 tablet, Rfl: 1     lisinopril-hydrochlorothiazide (ZESTORETIC) 10-12.5 MG tablet, Take 1 tablet by mouth once daily, Disp: 90 tablet, Rfl: 3     metFORMIN (GLUCOPHAGE) 1000 MG tablet, TAKE 1 TABLET BY MOUTH TWICE DAILY WITH MEALS, Disp: 180 tablet, Rfl: 1     Multiple Vitamins-Minerals (MULTIVITAL PO), , Disp: , Rfl:      omeprazole (PRILOSEC) 20 MG DR capsule, Take 1 capsule (20 mg) by mouth daily, Disp: 90 capsule, Rfl: 3     simvastatin (ZOCOR) 80 MG tablet, [SIMVASTATIN (ZOCOR) 80 MG TABLET] TAKE 1 TABLET BY MOUTH EVERYDAY AT BEDTIME, Disp: 90 tablet, Rfl: 3     tadalafil (CIALIS) 10 MG tablet, Take 10 mg by mouth daily as needed, Disp: , Rfl:     Current Facility-Administered Medications:      ceFAZolin (ANCEF) intermittent infusion 2 g in 100 mL dextrose  "PRE-MIX, 2 g, Intravenous, Pre-Op/Pre-procedure x 1 dose, Rj Bañuelos, DO     ceFAZolin (ANCEF) intermittent infusion 2 g in 100 mL dextrose PRE-MIX, 2 g, Intravenous, See Admin Instructions, Rj Bañuelos,      lactated ringers infusion, , Intravenous, Continuous, Ariadna Marley MD     lidocaine (LMX4) kit, , Topical, Q1H PRN, Ariadna Marley MD     lidocaine 1 % 0.1-1 mL, 0.1-1 mL, Other, Q1H PRN, Ariadna Marley MD     sodium chloride (PF) 0.9% PF flush 3 mL, 3 mL, Intracatheter, Q8H, Ariadna Marley MD     sodium chloride (PF) 0.9% PF flush 3 mL, 3 mL, Intracatheter, q1 min prn, Ariadna Marley MD    FAMHX-reviewed     reports that he has never smoked. He has never used smokeless tobacco. He reports current alcohol use of about 3.0 standard drinks of alcohol per week. He reports that he does not use drugs.    Review of Systems:  The 12 point review of systems  is within normal limits except for as mentioned above in the HPI.  General ROS: No complaints or constitutional symptoms  Ophthalmic ROS: No complaints of visual changes  Skin: No complaints or symptoms   Endocrine: No complaints or symptoms  Hematologic/Lymphatic: No symptoms or complaints  Psychiatric: No symptoms or complaints  Respiratory ROS: no cough, shortness of breath, or wheezing  Cardiovascular ROS: no chest pain or dyspnea on exertion  Gastrointestinal ROS: As per HPI  Genito-Urinary ROS: no dysuria, trouble voiding, or hematuria  Musculoskeletal ROS: no joint or muscle pain  Neurological ROS: no TIA or stroke symptoms      EXAM:  /79   Pulse 53   Temp 96.8  F (36  C) (Temporal)   Resp 16   Ht 1.702 m (5' 7\")   Wt 90.7 kg (200 lb)   SpO2 98%   BMI 31.32 kg/m    GENERAL: Well developed male, No acute distress, pleasant and conversant   EYES: Pupils equal, round and reactive, no scleral icterus  CARDIAC: Regular rate and rhythm, no obvious murmurs noted  CHEST/LUNG: Clear to ascultation bilaterally, No ronchi, No " wheezes  ABDOMEN: Soft  SKIN: Pink, warm and dry, no obvious rashes or lesions   NEURO:No focal deficits, ambulatory  MUSCULOSKELETAL:No obvious deformities, no swelling, normal appearing      LABS:  Lab Results   Component Value Date    WBC 7.1 01/05/2021    HGB 14.1 04/12/2022    HCT 44.4 01/05/2021    MCV 96 01/05/2021     01/05/2021     INR/Prothrombin Time  No results for input(s): NA, CO2, BUN, CREATININE, GLUCOSE in the last 168 hours.    Invalid input(s): K, CL, LABGLOM, CALCIUM  Lab Results   Component Value Date    ALT 37 04/12/2022    AST 27 04/12/2022    ALKPHOS 35 (L) 04/12/2022           Assessment/Plan:   Blu Allen is a 66 year old male with a back cyst  Plan for excision of the back cyst      Flaquito Bañuelos D.O. FACS  [phone]

## 2022-06-07 NOTE — OP NOTE
Name:  Blu Allen  PCP:  Van Schulz  Procedure Date:  6/7/2022      Procedure(s):  EXCISION, MASS, TORSO- back    Pre-Procedure Diagnosis:  Sebaceous cyst [L72.3]     Post-Procedure Diagnosis:    Epidermal inclusion cyst    Surgeon(s):  Rj Bañuelos DO    Circulator: Addie Cisse RN  Scrub Person: Tita Cornelius; Shantelle Holloway    Anesthesia Type:  Local MAc      Findings:  1 cm epidermal inclusion cyst    Operative Report:    Patient was taken the operating room placed in a right lateral decubitus position.  The back was prepped and draped sterile fashion.  I injected local anesthetic over the cystic lesion and made a transverse incision approximately 1 cm.  I then sharply dissected out a 1 cm epidermal inclusion cyst.  This was eventually sent off the field as specimen.  The wound was irrigated and closed with 0 Vicryl suture and 4-0 Monocryl suture.  The wound was cleaned and covered with Steri-Strips and dry sterile dressing.  The patient was brought sedation sent to the PACU to undergo recovery.    Estimated Blood Loss:   5cc    Specimens:    ID Type Source Tests Collected by Time Destination   1 : Back Mass Cyst Back, Lower SURGICAL PATHOLOGY EXAM Rj Bañuelos DO 6/7/2022 12:40 PM           Drains:        Complications:    None    Rj Bañuelos DO

## 2022-06-27 ENCOUNTER — TRANSFERRED RECORDS (OUTPATIENT)
Dept: HEALTH INFORMATION MANAGEMENT | Facility: CLINIC | Age: 67
End: 2022-06-27

## 2022-06-30 ENCOUNTER — TRANSFERRED RECORDS (OUTPATIENT)
Dept: HEALTH INFORMATION MANAGEMENT | Facility: CLINIC | Age: 67
End: 2022-06-30

## 2022-07-11 ENCOUNTER — TRANSFERRED RECORDS (OUTPATIENT)
Dept: HEALTH INFORMATION MANAGEMENT | Facility: CLINIC | Age: 67
End: 2022-07-11

## 2022-08-02 ENCOUNTER — TELEPHONE (OUTPATIENT)
Dept: SURGERY | Facility: CLINIC | Age: 67
End: 2022-08-02

## 2022-08-02 NOTE — TELEPHONE ENCOUNTER
Blu called today with concerns of pus like drainage from his incision site. He is s/p  Recurrent epidermal inclusion cyst excision on 6/7/22. He states he noticed a yellowish and blood tinged drainage and increased tenderness and redness about 1 week ago. I offered an appointment for a wound check, but unfortunately he tested positive for COVID yesterday.    I will have him send a picture of the incision site and any drainage and review this with Dr. Bañuelos for further recommendations. Pt is pleased with this plan.

## 2022-08-05 NOTE — TELEPHONE ENCOUNTER
I spoke with Blu after reviewing the picture of his incision. There is redness and irritation on the skin around the incision in the exact shape of the band aid. Discussed that it looks like he is having a reaction to the bandage adhesive and recommended he remove it, clean the area with water, and keep open. He may also change to using regular gauze with a different type of tape as well. The drainage is actually more clear/yellowish and thin in nature. Discussed that it appears to be a seroma caused increased pressure and opening of the incision. He is not having any pain. Denies fever/chills. I advised he continue to monitor and call me back with an update in the next couple days to make sure it is improving. If not, we will schedule him in clinic when his quarantine period for COVID is over.

## 2022-08-29 NOTE — TELEPHONE ENCOUNTER
I have reviewed the notes, assessments and agree with residents note.   Refill Approved    Rx renewed per Medication Renewal Policy. Medication was last renewed on 2/24/19.    Allie Warner, [facility] Triage/Med Refill 5/20/2019     Requested Prescriptions   Pending Prescriptions Disp Refills     lisinopril-hydrochlorothiazide (PRINZIDE,ZESTORETIC) 10-12.5 mg per tablet [Pharmacy Med Name: LISINOPRIL-HCTZ 10-12.5 MG TAB] 90 tablet 1     Sig: TAKE 1 TABLET BY MOUTH EVERY DAY       Diuretics/Combination Diuretics Refill Protocol  Passed - 5/19/2019  7:39 AM        Passed - Visit with PCP or prescribing provider visit in past 12 months     Last office visit with prescriber/PCP: 3/26/2019 Jacobo Correa MD OR same dept: 3/26/2019 Jacobo Correa MD OR same specialty: 3/26/2019 Jacobo Correa MD  Last physical: 8/7/2018 Last MTM visit: Visit date not found   Next visit within 3 mo: Visit date not found  Next physical within 3 mo: Visit date not found  Prescriber OR PCP: Jacobo Correa MD  Last diagnosis associated with med order: 1. Benign Essential Hypertension  - lisinopril-hydrochlorothiazide (PRINZIDE,ZESTORETIC) 10-12.5 mg per tablet [Pharmacy Med Name: LISINOPRIL-HCTZ 10-12.5 MG TAB]; TAKE 1 TABLET BY MOUTH EVERY DAY  Dispense: 90 tablet; Refill: 1    2. Essential Hypercholesterolemia  - fenofibrate (TRIGLIDE) 160 MG tablet [Pharmacy Med Name: FENOFIBRATE 160 MG TABLET]; TAKE 1 TABLET BY MOUTH EVERY DAY  Dispense: 90 tablet; Refill: 1    If protocol passes may refill for 12 months if within 3 months of last provider visit (or a total of 15 months).             Passed - Serum Potassium in past 12 months      Lab Results   Component Value Date    Potassium 4.1 08/28/2018             Passed - Serum Sodium in past 12 months      Lab Results   Component Value Date    Sodium 139 08/28/2018             Passed - Blood pressure on file in past 12 months     BP Readings from Last 1 Encounters:   03/26/19 110/64             Passed - Serum Creatinine in past 12  months      Creatinine   Date Value Ref Range Status   08/28/2018 1.50 (H) 0.70 - 1.30 mg/dL Final             fenofibrate (TRIGLIDE) 160 MG tablet [Pharmacy Med Name: FENOFIBRATE 160 MG TABLET] 90 tablet 1     Sig: TAKE 1 TABLET BY MOUTH EVERY DAY       Fenofibrate Refill Protocol Failed - 5/19/2019  7:39 AM        Failed - Renal status in last 6 months     Creatinine   Date Value Ref Range Status   08/28/2018 1.50 (H) 0.70 - 1.30 mg/dL Final             Failed - Fasting lipid cascade in last 12 months     Cholesterol   Date Value Ref Range Status   03/08/2017 148 <=199 mg/dL Final     Triglycerides   Date Value Ref Range Status   03/08/2017 175 (H) <=149 mg/dL Final     HDL Cholesterol   Date Value Ref Range Status   03/08/2017 42 >=40 mg/dL Final     LDL Calculated   Date Value Ref Range Status   03/08/2017 71 <=129 mg/dL Final     Patient Fasting > 8hrs?   Date Value Ref Range Status   03/08/2017 Yes  Final             Failed - AST or ALT in last 12 months     AST   Date Value Ref Range Status   03/08/2017 31 0 - 40 U/L Final     ALT   Date Value Ref Range Status   03/08/2017 43 0 - 45 U/L Final               Passed - PCP or prescribing provider visit in past 12 months       Last office visit with prescriber/PCP: 3/26/2019 Jacobo Correa MD OR same dept: 3/26/2019 Jacobo Correa MD OR same specialty: 3/26/2019 Jacobo Correa MD  Last physical: 8/7/2018 Last MTM visit: Visit date not found   Next visit within 3 mo: Visit date not found  Next physical within 3 mo: Visit date not found  Prescriber OR PCP: Jacobo Correa MD  Last diagnosis associated with med order: 1. Benign Essential Hypertension  - lisinopril-hydrochlorothiazide (PRINZIDE,ZESTORETIC) 10-12.5 mg per tablet [Pharmacy Med Name: LISINOPRIL-HCTZ 10-12.5 MG TAB]; TAKE 1 TABLET BY MOUTH EVERY DAY  Dispense: 90 tablet; Refill: 1    2. Essential Hypercholesterolemia  - fenofibrate (TRIGLIDE) 160 MG tablet [Pharmacy Med Name:  FENOFIBRATE 160 MG TABLET]; TAKE 1 TABLET BY MOUTH EVERY DAY  Dispense: 90 tablet; Refill: 1    If protocol passes may refill for 12 months if within 3 months of last provider visit (or a total of 15 months).

## 2022-09-25 ENCOUNTER — HEALTH MAINTENANCE LETTER (OUTPATIENT)
Age: 67
End: 2022-09-25

## 2022-10-10 DIAGNOSIS — E11.22 TYPE 2 DIABETES MELLITUS WITH STAGE 3A CHRONIC KIDNEY DISEASE, WITHOUT LONG-TERM CURRENT USE OF INSULIN (H): ICD-10-CM

## 2022-10-10 DIAGNOSIS — E78.00 PURE HYPERCHOLESTEROLEMIA: ICD-10-CM

## 2022-10-10 DIAGNOSIS — N18.31 TYPE 2 DIABETES MELLITUS WITH STAGE 3A CHRONIC KIDNEY DISEASE, WITHOUT LONG-TERM CURRENT USE OF INSULIN (H): ICD-10-CM

## 2022-10-10 RX ORDER — FENOFIBRATE 160 MG/1
160 TABLET ORAL DAILY
Qty: 90 TABLET | Refills: 1 | Status: SHIPPED | OUTPATIENT
Start: 2022-10-10 | End: 2023-04-17

## 2022-10-10 NOTE — TELEPHONE ENCOUNTER
Reason for Call:  Medication or medication refill:    Do you use a [facility]?  Name of the pharmacy and phone number for the current request:    Pharmacy pended      Name of the medication requested: Fenofibrate, metformin, and glipizide     Other request: Need fenofibrate send to [facility] pharmacy and the other two send to [facility] pharmacy. Pended both pharmacy. Pt has follow-up for diabetes check on 11/9/22.    Can we leave a detailed message on this number? YES    Phone number patient can be reached at: Cell number on file:    Telephone Information:   Mobile [phone]       Best Time: anytime    Call taken on 10/10/2022 at 1:56 PM by Chuy Posadas

## 2022-10-11 RX ORDER — GLIPIZIDE 5 MG/1
5 TABLET, FILM COATED, EXTENDED RELEASE ORAL DAILY
Qty: 90 TABLET | Refills: 0 | Status: SHIPPED | OUTPATIENT
Start: 2022-10-11 | End: 2023-01-18

## 2022-10-11 NOTE — TELEPHONE ENCOUNTER
"Routing refill request to provider for review/approval because:  Labs out of range:  a1c cr  Due to be seen    Last Written Prescription Date:  4/12/22  Last Fill Quantity: 180,  # refills: 1   Last office visit provider:  4/12/22     Requested Prescriptions   Pending Prescriptions Disp Refills     fenofibrate (TRIGLIDE/LOFIBRA) 160 MG tablet 90 tablet 3     Sig: Take 1 tablet (160 mg) by mouth daily       Fibrates Passed - 10/10/2022  1:59 PM        Passed - Lipid panel on file in past 12 months     Recent Labs   Lab Test 04/12/22  1027   CHOL 143   TRIG 101   HDL 50   LDL 73               Passed - No abnormal creatine kinase in past 12 months     No lab results found.             Passed - Recent (12 mo) or future (30 days) visit within the authorizing provider's specialty     Patient has had an office visit with the authorizing provider or a provider within the authorizing providers department within the previous 12 mos or has a future within next 30 days. See \"Patient Info\" tab in inbasket, or \"Choose Columns\" in Meds & Orders section of the refill encounter.              Passed - Medication is active on med list        Passed - Patient is age 18 or older           glipiZIDE (GLUCOTROL XL) 5 MG 24 hr tablet 90 tablet 1     Sig: Take 1 tablet (5 mg) by mouth daily       Sulfonylurea Agents Failed - 10/10/2022  1:59 PM        Failed - Patient has documented A1c within the specified period of time.     If HgbA1C is 8 or greater, it needs to be on file within the past 3 months.  If less than 8, must be on file within the past 6 months.     Recent Labs   Lab Test 04/12/22  1027   A1C 6.3*             Failed - Patient has a recent creatinine (normal) within the past 12 mos.     Recent Labs   Lab Test 04/12/22  1027   CR 1.44*       Ok to refill medication if creatinine is low          Failed - Recent (6 mo) or future (30 days) visit within the authorizing provider's specialty     Patient had office visit in the last 6 " "months or has a visit in the next 30 days with authorizing provider or within the authorizing provider's specialty.  See \"Patient Info\" tab in inbasket, or \"Choose Columns\" in Meds & Orders section of the refill encounter.            Passed - Medication is active on med list        Passed - Patient is age 18 or older           metFORMIN (GLUCOPHAGE) 1000 MG tablet 180 tablet 1     Sig: TAKE 1 TABLET BY MOUTH TWICE DAILY WITH MEALS       Biguanide Agents Failed - 10/10/2022  1:59 PM        Failed - Patient has documented A1c within the specified period of time.     If HgbA1C is 8 or greater, it needs to be on file within the past 3 months.  If less than 8, must be on file within the past 6 months.     Recent Labs   Lab Test 04/12/22  1027   A1C 6.3*             Failed - Recent (6 mo) or future (30 days) visit within the authorizing provider's specialty     Patient had office visit in the last 6 months or has a visit in the next 30 days with authorizing provider or within the authorizing provider's specialty.  See \"Patient Info\" tab in inbasket, or \"Choose Columns\" in Meds & Orders section of the refill encounter.            Passed - Patient is age 10 or older        Passed - Patient's CR is NOT>1.4 OR Patient's EGFR is NOT<45 within past 12 mos.     Recent Labs   Lab Test 04/12/22  1027 07/20/21  0824 01/05/21  0926   GFRESTIMATED 53*   < > 51*   GFRESTBLACK  --   --  >60    < > = values in this interval not displayed.       Recent Labs   Lab Test 04/12/22  1027   CR 1.44*             Passed - Patient does NOT have a diagnosis of CHF.        Passed - Medication is active on med list             Allie Warner, RN 10/10/22 7:46 PM  "

## 2022-10-17 ENCOUNTER — MYC MEDICAL ADVICE (OUTPATIENT)
Dept: FAMILY MEDICINE | Facility: CLINIC | Age: 67
End: 2022-10-17

## 2022-10-17 DIAGNOSIS — E78.00 PURE HYPERCHOLESTEROLEMIA: ICD-10-CM

## 2022-10-17 DIAGNOSIS — E11.22 TYPE 2 DIABETES MELLITUS WITH STAGE 3A CHRONIC KIDNEY DISEASE, WITHOUT LONG-TERM CURRENT USE OF INSULIN (H): ICD-10-CM

## 2022-10-17 DIAGNOSIS — I10 ESSENTIAL HYPERTENSION, BENIGN: ICD-10-CM

## 2022-10-17 DIAGNOSIS — K21.00 GASTROESOPHAGEAL REFLUX DISEASE WITH ESOPHAGITIS WITHOUT HEMORRHAGE: ICD-10-CM

## 2022-10-17 DIAGNOSIS — N18.31 TYPE 2 DIABETES MELLITUS WITH STAGE 3A CHRONIC KIDNEY DISEASE, WITHOUT LONG-TERM CURRENT USE OF INSULIN (H): ICD-10-CM

## 2022-10-17 RX ORDER — SIMVASTATIN 80 MG
TABLET ORAL
Qty: 90 TABLET | Refills: 1 | Status: SHIPPED | OUTPATIENT
Start: 2022-10-17 | End: 2023-04-17

## 2022-10-17 NOTE — TELEPHONE ENCOUNTER
"Routing refill request to provider for review/approval because:  Labs out of range:  Cr  bp    Last Written Prescription Date:  10/17/21  Last Fill Quantity: 90,  # refills: 3   Last office visit provider:  4/12/22     Requested Prescriptions   Pending Prescriptions Disp Refills     omeprazole (PRILOSEC) 20 MG DR capsule 90 capsule 3     Sig: Take 1 capsule (20 mg) by mouth daily       PPI Protocol Passed - 10/17/2022 11:37 AM        Passed - Not on Clopidogrel (unless Pantoprazole ordered)        Passed - No diagnosis of osteoporosis on record        Passed - Recent (12 mo) or future (30 days) visit within the authorizing provider's specialty     Patient has had an office visit with the authorizing provider or a provider within the authorizing providers department within the previous 12 mos or has a future within next 30 days. See \"Patient Info\" tab in inbasket, or \"Choose Columns\" in Meds & Orders section of the refill encounter.              Passed - Medication is active on med list        Passed - Patient is age 18 or older           lisinopril-hydrochlorothiazide (ZESTORETIC) 10-12.5 MG tablet 90 tablet 3     Sig: Take 1 tablet by mouth once daily       Diuretics (Including Combos) Protocol Failed - 10/17/2022 11:37 AM        Failed - Blood pressure under 140/90 in past 12 months     BP Readings from Last 3 Encounters:   06/07/22 (!) 164/88   04/12/22 118/76   02/21/22 118/60                 Failed - Normal serum creatinine on file in past 12 months     Recent Labs   Lab Test 04/12/22  1027   CR 1.44*              Passed - Recent (12 mo) or future (30 days) visit within the authorizing provider's specialty     Patient has had an office visit with the authorizing provider or a provider within the authorizing providers department within the previous 12 mos or has a future within next 30 days. See \"Patient Info\" tab in inbasket, or \"Choose Columns\" in Meds & Orders section of the refill encounter.              " "Passed - Medication is active on med list        Passed - Patient is age 18 or older        Passed - Normal serum potassium on file in past 12 months     Recent Labs   Lab Test 04/12/22  1027   POTASSIUM 4.8                    Passed - Normal serum sodium on file in past 12 months     Recent Labs   Lab Test 04/12/22  1027                ACE Inhibitors (Including Combos) Protocol Failed - 10/17/2022 11:37 AM        Failed - Blood pressure under 140/90 in past 12 months     BP Readings from Last 3 Encounters:   06/07/22 (!) 164/88   04/12/22 118/76   02/21/22 118/60                 Failed - Normal serum creatinine on file in past 12 months     Recent Labs   Lab Test 04/12/22  1027   CR 1.44*       Ok to refill medication if creatinine is low          Passed - Recent (12 mo) or future (30 days) visit within the authorizing provider's specialty     Patient has had an office visit with the authorizing provider or a provider within the authorizing providers department within the previous 12 mos or has a future within next 30 days. See \"Patient Info\" tab in inbasket, or \"Choose Columns\" in Meds & Orders section of the refill encounter.              Passed - Medication is active on med list        Passed - Patient is age 18 or older        Passed - Normal serum potassium on file in past 12 months     Recent Labs   Lab Test 04/12/22  1027   POTASSIUM 4.8                simvastatin (ZOCOR) 80 MG tablet 90 tablet 3     Sig: [SIMVASTATIN (ZOCOR) 80 MG TABLET] TAKE 1 TABLET BY MOUTH EVERYDAY AT BEDTIME       Statins Protocol Passed - 10/17/2022 11:37 AM        Passed - LDL on file in past 12 months     Recent Labs   Lab Test 04/12/22  1027   LDL 73             Passed - No abnormal creatine kinase in past 12 months     No lab results found.             Passed - Recent (12 mo) or future (30 days) visit within the authorizing provider's specialty     Patient has had an office visit with the authorizing provider or a provider " "within the authorizing providers department within the previous 12 mos or has a future within next 30 days. See \"Patient Info\" tab in inbasket, or \"Choose Columns\" in Meds & Orders section of the refill encounter.              Passed - Medication is active on med list        Passed - Patient is age 18 or older             Allie Warner RN 10/17/22 3:02 PM  "

## 2022-10-19 RX ORDER — LISINOPRIL/HYDROCHLOROTHIAZIDE 10-12.5 MG
TABLET ORAL
Qty: 90 TABLET | Refills: 3 | Status: SHIPPED | OUTPATIENT
Start: 2022-10-19 | End: 2023-05-11

## 2022-11-09 ENCOUNTER — OFFICE VISIT (OUTPATIENT)
Dept: FAMILY MEDICINE | Facility: CLINIC | Age: 67
End: 2022-11-09
Payer: MEDICARE

## 2022-11-09 VITALS
WEIGHT: 196 LBS | OXYGEN SATURATION: 97 % | BODY MASS INDEX: 30.76 KG/M2 | SYSTOLIC BLOOD PRESSURE: 130 MMHG | HEIGHT: 67 IN | RESPIRATION RATE: 14 BRPM | DIASTOLIC BLOOD PRESSURE: 74 MMHG | HEART RATE: 66 BPM | TEMPERATURE: 97.5 F

## 2022-11-09 DIAGNOSIS — N18.31 STAGE 3A CHRONIC KIDNEY DISEASE (H): ICD-10-CM

## 2022-11-09 DIAGNOSIS — E11.9 TYPE 2 DIABETES MELLITUS WITHOUT COMPLICATION, WITHOUT LONG-TERM CURRENT USE OF INSULIN (H): Primary | ICD-10-CM

## 2022-11-09 DIAGNOSIS — C61 MALIGNANT TUMOR OF PROSTATE (H): ICD-10-CM

## 2022-11-09 DIAGNOSIS — D48.5 NEOPLASM OF UNCERTAIN BEHAVIOR OF SKIN: ICD-10-CM

## 2022-11-09 DIAGNOSIS — G25.0 ESSENTIAL TREMOR: ICD-10-CM

## 2022-11-09 DIAGNOSIS — I10 BENIGN ESSENTIAL HYPERTENSION: ICD-10-CM

## 2022-11-09 DIAGNOSIS — E78.00 PRIMARY HYPERCHOLESTEROLEMIA: ICD-10-CM

## 2022-11-09 LAB
ALBUMIN SERPL BCG-MCNC: 4.5 G/DL (ref 3.5–5.2)
ALP SERPL-CCNC: 37 U/L (ref 40–129)
ALT SERPL W P-5'-P-CCNC: 28 U/L (ref 10–50)
ANION GAP SERPL CALCULATED.3IONS-SCNC: 14 MMOL/L (ref 7–15)
AST SERPL W P-5'-P-CCNC: 43 U/L (ref 10–50)
BILIRUB SERPL-MCNC: 0.4 MG/DL
BUN SERPL-MCNC: 15.1 MG/DL (ref 8–23)
CALCIUM SERPL-MCNC: 10.2 MG/DL (ref 8.8–10.2)
CHLORIDE SERPL-SCNC: 105 MMOL/L (ref 98–107)
CREAT SERPL-MCNC: 1.19 MG/DL (ref 0.67–1.17)
CREAT UR-MCNC: 99.1 MG/DL
DEPRECATED HCO3 PLAS-SCNC: 22 MMOL/L (ref 22–29)
GFR SERPL CREATININE-BSD FRML MDRD: 67 ML/MIN/1.73M2
GLUCOSE SERPL-MCNC: 109 MG/DL (ref 70–99)
HBA1C MFR BLD: 6.3 % (ref 0–5.6)
MICROALBUMIN UR-MCNC: 14.1 MG/L
MICROALBUMIN/CREAT UR: 14.23 MG/G CR (ref 0–17)
POTASSIUM SERPL-SCNC: 4.9 MMOL/L (ref 3.4–5.3)
PROT SERPL-MCNC: 7.4 G/DL (ref 6.4–8.3)
SODIUM SERPL-SCNC: 141 MMOL/L (ref 136–145)

## 2022-11-09 PROCEDURE — 17000 DESTRUCT PREMALG LESION: CPT | Performed by: NURSE PRACTITIONER

## 2022-11-09 PROCEDURE — 99214 OFFICE O/P EST MOD 30 MIN: CPT | Mod: 25 | Performed by: NURSE PRACTITIONER

## 2022-11-09 PROCEDURE — 82043 UR ALBUMIN QUANTITATIVE: CPT | Performed by: NURSE PRACTITIONER

## 2022-11-09 PROCEDURE — 83036 HEMOGLOBIN GLYCOSYLATED A1C: CPT | Performed by: NURSE PRACTITIONER

## 2022-11-09 PROCEDURE — 80053 COMPREHEN METABOLIC PANEL: CPT | Performed by: NURSE PRACTITIONER

## 2022-11-09 PROCEDURE — 36415 COLL VENOUS BLD VENIPUNCTURE: CPT | Performed by: NURSE PRACTITIONER

## 2022-11-09 RX ORDER — PROPRANOLOL HCL 60 MG
60 CAPSULE, EXTENDED RELEASE 24HR ORAL DAILY
Qty: 30 CAPSULE | Refills: 0 | Status: SHIPPED | OUTPATIENT
Start: 2022-11-09 | End: 2022-11-12

## 2022-11-09 NOTE — PATIENT INSTRUCTIONS
We can try the propranolol to manage the essential tremors.  Watch out for potential side effects like lightheadedness, dizziness, and exercise intolerance.  If tolerating the dose can always be increased but if this is not well-tolerated you can just go ahead and discontinue it.  There are other treatment options available for tremor management, but those would have to come from a neurologist like [facility].    We froze a spot on the chest off.  If it grows back we can always get you in here to numb it up and shave it off to see what exactly it is.  If it blisters, just leave it alone.  You can put a Band-Aid over it for any drainage.    Updating lab work today.

## 2022-11-09 NOTE — PROGRESS NOTES
Assessment & Plan     ICD-10-CM    1. Type 2 diabetes mellitus without complication, without long-term current use of insulin (H)  E11.9 HEMOGLOBIN A1C      2. Stage 3a chronic kidney disease (H)  N18.31 Albumin Random Urine Quantitative with Creat Ratio      3. Primary hypercholesterolemia  E78.00 Comprehensive metabolic panel (BMP + Alb, Alk Phos, ALT, AST, Total. Bili, TP)      4. Benign essential hypertension  I10 Comprehensive metabolic panel (BMP + Alb, Alk Phos, ALT, AST, Total. Bili, TP)      5. Malignant tumor of prostate (H)  C61       6. Neoplasm of uncertain behavior of skin  D48.5 DESTRUCT BENIGN LESION, UP TO 14      7. Essential tremor  G25.0 propranolol ER (INDERAL LA) 60 MG 24 hr capsule        Prostate cancer history managed by urology.  Starting next fall will be taken over by me once he has the 5-year tucker.  Update diabetic labs.  Lesion on the chest identified.  Discussed different treatment options.  He would like to do cryotherapy.  After risks and benefits were discussed, cryotherapy applied X 5 seconds X3 times.  Well-tolerated and postprocedure cares discussed with the patient.  Discussed management of his essential tremors.  Propranolol that was previously recommended by neurology sent off.  Reviewed potential side effects especially given his borderline heart rate.  If too symptomatic, go ahead and discontinue and follow-up in neurology for other options.    Subjective     HPI     Answers for HPI/ROS submitted by the patient on 11/4/2022  Frequency of checking blood sugars:: not at all  Diabetic concerns:: none  Paraesthesia present:: none of these symptoms  Have you had a diabetic eye exam within the last year?: No  How many servings of fruits and vegetables do you eat daily?: 2-3  On average, how many sweetened beverages do you drink each day (Examples: soda, juice, sweet tea, etc.  Do NOT count diet or artificially sweetened beverages)?: 0  How many minutes a day do you exercise enough  "to make your heart beat faster?: 20 to 29  How many days a week do you exercise enough to make your heart beat faster?: 3 or less  How many days per week do you miss taking your medication?: 0    Diabetes  Currently managed with metformin 1000 mg twice daily and glipizide 5 mg daily. Eye exam is coming up later this month.      Lab Results   Component Value Date    A1C 6.3 04/12/2022    A1C 5.9 11/19/2021    A1C 8.0 07/20/2021    A1C 7.7 01/05/2021    A1C 6.2 07/07/2020         Hypertension/hypercholesterolemia  Continues with fenofibrate and simvastatin.  No diffuse myalgias.  Blood pressure shows acceptable control with current lisinopril hydrochlorothiazide    CKD 3  Recheck today    Prostate cancer  Managed by [facility].    Skin growth  Noted on the center of his chest.  Slightly raised.  Little irritated.  No bleeding.    Review of Systems - negative except for what's listed in the HPI      Objective    /74   Pulse 66   Temp 97.5  F (36.4  C) (Oral)   Resp 14   Ht 1.702 m (5' 7\")   Wt 88.9 kg (196 lb)   SpO2 97%   BMI 30.70 kg/m    Physical Exam   General appearance - alert, well appearing, and in no distress  Mental status - alert, oriented to person, place, and time  Eyes -PERRLA  Neck - no carotid bruit  Lymphatics - no palpable lymphadenopathy  Chest - clear to auscultation, no wheezes, rales or rhonchi, symmetric air entry  Heart - normal rate and regular rhythm, S1 and S2 normal, no murmurs noted  Abdomen - soft, nontender, nondistended, no masses or organomegaly  Neurological - alert, oriented, normal speech, no focal findings or movement disorder noted, neck supple without rigidity, cranial nerves II through XII intact, motor and sensory grossly normal bilaterally, normal muscle tone, no tremors, strength 5/5  Extremities - peripheral pulses normal, no peripheral edema  Skin -in the middle of the sternum there is a slightly raised lesion measuring approximately 7 mm in diameter.  " Flesh-colored.  Has a clustered appearance to it.  No blisters or wheals.  No surrounding erythema.  No peeling.  Irregular border.    Van Schulz, CNP    This note has been dictated using voice recognition software. Any grammatical or context distortions are unintentional and inherent to the software.

## 2022-11-10 ENCOUNTER — MYC MEDICAL ADVICE (OUTPATIENT)
Dept: FAMILY MEDICINE | Facility: CLINIC | Age: 67
End: 2022-11-10

## 2022-11-10 DIAGNOSIS — I10 BENIGN ESSENTIAL HYPERTENSION: Primary | ICD-10-CM

## 2022-11-10 DIAGNOSIS — G25.0 ESSENTIAL TREMOR: ICD-10-CM

## 2022-11-10 RX ORDER — PROPRANOLOL HYDROCHLORIDE 20 MG/1
20 TABLET ORAL 3 TIMES DAILY
Qty: 90 TABLET | Refills: 0 | Status: SHIPPED | OUTPATIENT
Start: 2022-11-10 | End: 2023-05-11

## 2022-11-12 RX ORDER — PROPRANOLOL HCL 60 MG
60 CAPSULE, EXTENDED RELEASE 24HR ORAL DAILY
Qty: 30 CAPSULE | Refills: 0 | Status: SHIPPED | OUTPATIENT
Start: 2022-11-12 | End: 2023-05-11

## 2022-11-29 ENCOUNTER — TRANSFERRED RECORDS (OUTPATIENT)
Dept: HEALTH INFORMATION MANAGEMENT | Facility: CLINIC | Age: 67
End: 2022-11-29

## 2022-11-29 LAB — RETINOPATHY: NEGATIVE

## 2023-01-09 ENCOUNTER — TRANSFERRED RECORDS (OUTPATIENT)
Dept: HEALTH INFORMATION MANAGEMENT | Facility: CLINIC | Age: 68
End: 2023-01-09

## 2023-01-10 ENCOUNTER — MYC MEDICAL ADVICE (OUTPATIENT)
Dept: FAMILY MEDICINE | Facility: CLINIC | Age: 68
End: 2023-01-10

## 2023-01-10 DIAGNOSIS — G25.0 ESSENTIAL TREMOR: Primary | ICD-10-CM

## 2023-01-11 RX ORDER — PROPRANOLOL HCL 60 MG
60 CAPSULE, EXTENDED RELEASE 24HR ORAL DAILY
Qty: 90 CAPSULE | Refills: 2 | Status: SHIPPED | OUTPATIENT
Start: 2023-01-11 | End: 2023-05-11

## 2023-01-11 NOTE — TELEPHONE ENCOUNTER
"Last Written Prescription Date:  11/12/2022  Last Fill Quantity: 30,  # refills: 0   Last office visit provider:  11/9/2022     Requested Prescriptions   Pending Prescriptions Disp Refills     propranolol ER (INDERAL LA) 60 MG 24 hr capsule       Sig: Take by mouth daily       Beta-Blockers Protocol Passed - 1/11/2023 11:48 AM        Passed - Blood pressure under 140/90 in past 12 months     BP Readings from Last 3 Encounters:   11/09/22 130/74   06/07/22 (!) 164/88   04/12/22 118/76                 Passed - Patient is age 6 or older        Passed - Recent (12 mo) or future (30 days) visit within the authorizing provider's specialty     Patient has had an office visit with the authorizing provider or a provider within the authorizing providers department within the previous 12 mos or has a future within next 30 days. See \"Patient Info\" tab in inbasket, or \"Choose Columns\" in Meds & Orders section of the refill encounter.              Passed - Medication is active on med list             Raissa Lee RN 01/11/23 3:20 PM  "

## 2023-01-11 NOTE — TELEPHONE ENCOUNTER
[employer] message sent, medication pending, routed to refill team.       Estefany Mcnamara RN on 1/11/2023 at 11:47 AM

## 2023-01-17 DIAGNOSIS — E11.22 TYPE 2 DIABETES MELLITUS WITH STAGE 3A CHRONIC KIDNEY DISEASE, WITHOUT LONG-TERM CURRENT USE OF INSULIN (H): ICD-10-CM

## 2023-01-17 DIAGNOSIS — N18.31 TYPE 2 DIABETES MELLITUS WITH STAGE 3A CHRONIC KIDNEY DISEASE, WITHOUT LONG-TERM CURRENT USE OF INSULIN (H): ICD-10-CM

## 2023-01-18 RX ORDER — GLIPIZIDE 5 MG/1
TABLET, FILM COATED, EXTENDED RELEASE ORAL
Qty: 90 TABLET | Refills: 0 | Status: SHIPPED | OUTPATIENT
Start: 2023-01-18 | End: 2023-04-17

## 2023-01-18 NOTE — TELEPHONE ENCOUNTER
"Routing refill request to provider for review/approval because:  Labs out of range:  cr    Last Written Prescription Date:  10/11/22  Last Fill Quantity: 90,  # refills: 0   Last office visit provider:  11/9/22     Requested Prescriptions   Pending Prescriptions Disp Refills     metFORMIN (GLUCOPHAGE) 1000 MG tablet [Pharmacy Med Name: metFORMIN HCl Oral Tablet 1000 MG] 180 tablet 0     Sig: TAKE 1 TABLET BY MOUTH TWICE DAILY WITH MEALS       Biguanide Agents Passed - 1/17/2023 10:20 AM        Passed - Patient is age 10 or older        Passed - Patient has documented A1c within the specified period of time.     If HgbA1C is 8 or greater, it needs to be on file within the past 3 months.  If less than 8, must be on file within the past 6 months.     Recent Labs   Lab Test 11/09/22  1437   A1C 6.3*             Passed - Patient's CR is NOT>1.4 OR Patient's EGFR is NOT<45 within past 12 mos.     Recent Labs   Lab Test 11/09/22  1437 07/20/21  0824 01/05/21  0926   GFRESTIMATED 67   < > 51*   GFRESTBLACK  --   --  >60    < > = values in this interval not displayed.       Recent Labs   Lab Test 11/09/22  1437   CR 1.19*             Passed - Patient does NOT have a diagnosis of CHF.        Passed - Medication is active on med list        Passed - Recent (6 mo) or future (30 days) visit within the authorizing provider's specialty     Patient had office visit in the last 6 months or has a visit in the next 30 days with authorizing provider or within the authorizing provider's specialty.  See \"Patient Info\" tab in inbasket, or \"Choose Columns\" in Meds & Orders section of the refill encounter.               glipiZIDE (GLUCOTROL XL) 5 MG 24 hr tablet [Pharmacy Med Name: glipiZIDE ER Oral Tablet Extended Release 24 Hour 5 MG] 90 tablet 0     Sig: TAKE ONE TABLET BY MOUTH ONE TIME DAILY       Sulfonylurea Agents Failed - 1/17/2023 10:20 AM        Failed - Patient has a recent creatinine (normal) within the past 12 mos.     Recent " "Labs   Lab Test 11/09/22  1437   CR 1.19*       Ok to refill medication if creatinine is low          Passed - Patient has documented A1c within the specified period of time.     If HgbA1C is 8 or greater, it needs to be on file within the past 3 months.  If less than 8, must be on file within the past 6 months.     Recent Labs   Lab Test 11/09/22  1437   A1C 6.3*             Passed - Medication is active on med list        Passed - Patient is age 18 or older        Passed - Recent (6 mo) or future (30 days) visit within the authorizing provider's specialty     Patient had office visit in the last 6 months or has a visit in the next 30 days with authorizing provider or within the authorizing provider's specialty.  See \"Patient Info\" tab in inbasket, or \"Choose Columns\" in Meds & Orders section of the refill encounter.                 Allie Warner RN 01/18/23 1:09 PM  "

## 2023-03-13 ENCOUNTER — TRANSFERRED RECORDS (OUTPATIENT)
Dept: HEALTH INFORMATION MANAGEMENT | Facility: CLINIC | Age: 68
End: 2023-03-13
Payer: MEDICARE

## 2023-04-16 DIAGNOSIS — K21.00 GASTROESOPHAGEAL REFLUX DISEASE WITH ESOPHAGITIS WITHOUT HEMORRHAGE: ICD-10-CM

## 2023-04-16 DIAGNOSIS — N18.31 TYPE 2 DIABETES MELLITUS WITH STAGE 3A CHRONIC KIDNEY DISEASE, WITHOUT LONG-TERM CURRENT USE OF INSULIN (H): ICD-10-CM

## 2023-04-16 DIAGNOSIS — E11.22 TYPE 2 DIABETES MELLITUS WITH STAGE 3A CHRONIC KIDNEY DISEASE, WITHOUT LONG-TERM CURRENT USE OF INSULIN (H): ICD-10-CM

## 2023-04-16 DIAGNOSIS — E78.00 PURE HYPERCHOLESTEROLEMIA: ICD-10-CM

## 2023-04-16 NOTE — TELEPHONE ENCOUNTER
"Routing refill request to provider for review/approval because:  Labs not current:  Lipid panel    Last Written Prescription Date:  10/10/2022  Last Fill Quantity: 90,  # refills: 1   Last office visit provider:  11/9/2022     Requested Prescriptions   Pending Prescriptions Disp Refills     fenofibrate (TRIGLIDE/LOFIBRA) 160 MG tablet [Pharmacy Med Name: Fenofibrate Oral Tablet 160 MG] 90 tablet 0     Sig: TAKE ONE TABLET BY MOUTH ONE TIME DAILY       Fibrates Failed - 4/16/2023 12:12 PM        Failed - Lipid panel on file in past 12 months     Recent Labs   Lab Test 04/12/22  1027   CHOL 143   TRIG 101   HDL 50   LDL 73               Passed - No abnormal creatine kinase in past 12 months     No lab results found.             Passed - Recent (12 mo) or future (30 days) visit within the authorizing provider's specialty     Patient has had an office visit with the authorizing provider or a provider within the authorizing providers department within the previous 12 mos or has a future within next 30 days. See \"Patient Info\" tab in inbasket, or \"Choose Columns\" in Meds & Orders section of the refill encounter.              Passed - Medication is active on med list        Passed - Patient is age 18 or older             Elvia Bell RN 04/16/23 4:27 PM  "

## 2023-04-16 NOTE — TELEPHONE ENCOUNTER
"Zocor  Routing refill request to provider for review/approval because:  Labs not current:  LDL    Last Written Prescription Date:  10/17/2022  Last Fill Quantity: 90,  # refills: 1   Last office visit provider:  11/9/2022     Requested Prescriptions   Pending Prescriptions Disp Refills     simvastatin (ZOCOR) 80 MG tablet [Pharmacy Med Name: Simvastatin Oral Tablet 80 MG] 90 tablet 0     Sig: TAKE ONE TABLET BY MOUTH AT BEDTIME       Statins Protocol Failed - 4/16/2023 12:17 PM        Failed - LDL on file in past 12 months     Recent Labs   Lab Test 04/12/22  1027   LDL 73             Passed - No abnormal creatine kinase in past 12 months     No lab results found.             Passed - Recent (12 mo) or future (30 days) visit within the authorizing provider's specialty     Patient has had an office visit with the authorizing provider or a provider within the authorizing providers department within the previous 12 mos or has a future within next 30 days. See \"Patient Info\" tab in inDelaGetet, or \"Choose Columns\" in Meds & Orders section of the refill encounter.              Passed - Medication is active on med list        Passed - Patient is age 18 or older           omeprazole (PRILOSEC) 20 MG DR capsule [Pharmacy Med Name: Omeprazole Oral Capsule Delayed Release 20 MG] 90 capsule 0     Sig: TAKE ONE CAPSULE BY MOUTH ONE TIME DAILY       PPI Protocol Passed - 4/16/2023 12:17 PM        Passed - Not on Clopidogrel (unless Pantoprazole ordered)        Passed - No diagnosis of osteoporosis on record        Passed - Recent (12 mo) or future (30 days) visit within the authorizing provider's specialty     Patient has had an office visit with the authorizing provider or a provider within the authorizing providers department within the previous 12 mos or has a future within next 30 days. See \"Patient Info\" tab in inDelaGetet, or \"Choose Columns\" in Meds & Orders section of the refill encounter.              Passed - Medication is " "active on med list        Passed - Patient is age 18 or older             Elvia Bell RN 04/16/23 4:29 PM      Prilosec  Last Written Prescription Date:  10/17/2022  Last Fill Quantity: 90,  # refills: 1   Last office visit provider:  11/9/2022     Requested Prescriptions   Pending Prescriptions Disp Refills     simvastatin (ZOCOR) 80 MG tablet [Pharmacy Med Name: Simvastatin Oral Tablet 80 MG] 90 tablet 0     Sig: TAKE ONE TABLET BY MOUTH AT BEDTIME       Statins Protocol Failed - 4/16/2023 12:17 PM        Failed - LDL on file in past 12 months     Recent Labs   Lab Test 04/12/22  1027   LDL 73             Passed - No abnormal creatine kinase in past 12 months     No lab results found.             Passed - Recent (12 mo) or future (30 days) visit within the authorizing provider's specialty     Patient has had an office visit with the authorizing provider or a provider within the authorizing providers department within the previous 12 mos or has a future within next 30 days. See \"Patient Info\" tab in inZukiet, or \"Choose Columns\" in Meds & Orders section of the refill encounter.              Passed - Medication is active on med list        Passed - Patient is age 18 or older           omeprazole (PRILOSEC) 20 MG DR capsule [Pharmacy Med Name: Omeprazole Oral Capsule Delayed Release 20 MG] 90 capsule 0     Sig: TAKE ONE CAPSULE BY MOUTH ONE TIME DAILY       PPI Protocol Passed - 4/16/2023 12:17 PM        Passed - Not on Clopidogrel (unless Pantoprazole ordered)        Passed - No diagnosis of osteoporosis on record        Passed - Recent (12 mo) or future (30 days) visit within the authorizing provider's specialty     Patient has had an office visit with the authorizing provider or a provider within the authorizing providers department within the previous 12 mos or has a future within next 30 days. See \"Patient Info\" tab in inZukiet, or \"Choose Columns\" in Meds & Orders section of the refill encounter.       "        Passed - Medication is active on med list        Passed - Patient is age 18 or older             Elvia Bell, KENZIE 04/16/23 4:29 PM

## 2023-04-16 NOTE — TELEPHONE ENCOUNTER
"Routing refill request to provider for review/approval because:  Labs out of range:  Creatinine GFR    Last Written Prescription Date:  1/18/2023  Last Fill Quantity: 90,  # refills: 0   Last office visit provider:  11/9/2022     Requested Prescriptions   Pending Prescriptions Disp Refills     glipiZIDE (GLUCOTROL XL) 5 MG 24 hr tablet [Pharmacy Med Name: glipiZIDE ER Oral Tablet Extended Release 24 Hour 5 MG] 90 tablet 0     Sig: TAKE ONE TABLET BY MOUTH ONE TIME DAILY       Sulfonylurea Agents Failed - 4/16/2023  4:32 PM        Failed - Patient has a recent creatinine (normal) within the past 12 mos.     Recent Labs   Lab Test 11/09/22  1437   CR 1.19*       Ok to refill medication if creatinine is low          Passed - Patient has documented A1c within the specified period of time.     If HgbA1C is 8 or greater, it needs to be on file within the past 3 months.  If less than 8, must be on file within the past 6 months.     Recent Labs   Lab Test 11/09/22  1437   A1C 6.3*             Passed - Medication is active on med list        Passed - Patient is age 18 or older        Passed - Recent (6 mo) or future (30 days) visit within the authorizing provider's specialty     Patient had office visit in the last 6 months or has a visit in the next 30 days with authorizing provider or within the authorizing provider's specialty.  See \"Patient Info\" tab in inbasket, or \"Choose Columns\" in Meds & Orders section of the refill encounter.               metFORMIN (GLUCOPHAGE) 1000 MG tablet [Pharmacy Med Name: metFORMIN HCl Oral Tablet 1000 MG] 180 tablet 0     Sig: TAKE 1 TABLET BY MOUTH TWICE DAILY WITH MEALS       Biguanide Agents Passed - 4/16/2023  4:32 PM        Passed - Patient is age 10 or older        Passed - Patient has documented A1c within the specified period of time.     If HgbA1C is 8 or greater, it needs to be on file within the past 3 months.  If less than 8, must be on file within the past 6 months.     Recent " "Labs   Lab Test 11/09/22  1437   A1C 6.3*             Passed - Patient's CR is NOT>1.4 OR Patient's EGFR is NOT<45 within past 12 mos.     Recent Labs   Lab Test 11/09/22  1437 07/20/21  0824 01/05/21  0926   GFRESTIMATED 67   < > 51*   GFRESTBLACK  --   --  >60    < > = values in this interval not displayed.       Recent Labs   Lab Test 11/09/22  1437   CR 1.19*             Passed - Patient does NOT have a diagnosis of CHF.        Passed - Medication is active on med list        Passed - Recent (6 mo) or future (30 days) visit within the authorizing provider's specialty     Patient had office visit in the last 6 months or has a visit in the next 30 days with authorizing provider or within the authorizing provider's specialty.  See \"Patient Info\" tab in inbasket, or \"Choose Columns\" in Meds & Orders section of the refill encounter.                 Addie Lockett RN 04/16/23 4:33 PM  "

## 2023-04-17 RX ORDER — SIMVASTATIN 80 MG
TABLET ORAL
Qty: 90 TABLET | Refills: 0 | Status: SHIPPED | OUTPATIENT
Start: 2023-04-17 | End: 2023-05-11

## 2023-04-17 RX ORDER — FENOFIBRATE 160 MG/1
TABLET ORAL
Qty: 90 TABLET | Refills: 0 | Status: SHIPPED | OUTPATIENT
Start: 2023-04-17 | End: 2023-05-11

## 2023-04-17 RX ORDER — GLIPIZIDE 5 MG/1
TABLET, FILM COATED, EXTENDED RELEASE ORAL
Qty: 90 TABLET | Refills: 0 | Status: SHIPPED | OUTPATIENT
Start: 2023-04-17 | End: 2023-05-11

## 2023-05-04 ASSESSMENT — ENCOUNTER SYMPTOMS
FREQUENCY: 0
ARTHRALGIAS: 0
ABDOMINAL PAIN: 0
WEAKNESS: 0
FEVER: 0
EYE PAIN: 0
CHILLS: 0
HEMATURIA: 0
PALPITATIONS: 0
COUGH: 0
NERVOUS/ANXIOUS: 0
SHORTNESS OF BREATH: 0
HEMATOCHEZIA: 0
MYALGIAS: 0
DIARRHEA: 0
NAUSEA: 0
JOINT SWELLING: 0
DIZZINESS: 0
HEADACHES: 0
SORE THROAT: 0
CONSTIPATION: 0
DYSURIA: 0
PARESTHESIAS: 0
HEARTBURN: 0

## 2023-05-04 ASSESSMENT — ACTIVITIES OF DAILY LIVING (ADL): CURRENT_FUNCTION: NO ASSISTANCE NEEDED

## 2023-05-11 ENCOUNTER — OFFICE VISIT (OUTPATIENT)
Dept: FAMILY MEDICINE | Facility: CLINIC | Age: 68
End: 2023-05-11
Attending: NURSE PRACTITIONER
Payer: MEDICARE

## 2023-05-11 VITALS
WEIGHT: 200.4 LBS | HEIGHT: 68 IN | TEMPERATURE: 98.3 F | RESPIRATION RATE: 16 BRPM | HEART RATE: 55 BPM | SYSTOLIC BLOOD PRESSURE: 120 MMHG | OXYGEN SATURATION: 98 % | BODY MASS INDEX: 30.37 KG/M2 | DIASTOLIC BLOOD PRESSURE: 74 MMHG

## 2023-05-11 DIAGNOSIS — E11.22 TYPE 2 DIABETES MELLITUS WITH STAGE 3A CHRONIC KIDNEY DISEASE, WITHOUT LONG-TERM CURRENT USE OF INSULIN (H): ICD-10-CM

## 2023-05-11 DIAGNOSIS — C44.91 BASAL CELL CARCINOMA (BCC), UNSPECIFIED SITE: ICD-10-CM

## 2023-05-11 DIAGNOSIS — N18.31 STAGE 3A CHRONIC KIDNEY DISEASE (H): ICD-10-CM

## 2023-05-11 DIAGNOSIS — I10 ESSENTIAL HYPERTENSION, BENIGN: ICD-10-CM

## 2023-05-11 DIAGNOSIS — G25.0 ESSENTIAL TREMOR: ICD-10-CM

## 2023-05-11 DIAGNOSIS — C61 MALIGNANT TUMOR OF PROSTATE (H): ICD-10-CM

## 2023-05-11 DIAGNOSIS — Z00.00 ENCOUNTER FOR MEDICARE ANNUAL WELLNESS EXAM: Primary | ICD-10-CM

## 2023-05-11 DIAGNOSIS — Z85.828 HISTORY OF BASAL CELL CARCINOMA: ICD-10-CM

## 2023-05-11 DIAGNOSIS — Z12.5 SCREENING FOR PROSTATE CANCER: ICD-10-CM

## 2023-05-11 DIAGNOSIS — K21.00 GASTROESOPHAGEAL REFLUX DISEASE WITH ESOPHAGITIS WITHOUT HEMORRHAGE: ICD-10-CM

## 2023-05-11 DIAGNOSIS — E78.00 PURE HYPERCHOLESTEROLEMIA: ICD-10-CM

## 2023-05-11 DIAGNOSIS — N18.31 TYPE 2 DIABETES MELLITUS WITH STAGE 3A CHRONIC KIDNEY DISEASE, WITHOUT LONG-TERM CURRENT USE OF INSULIN (H): ICD-10-CM

## 2023-05-11 LAB
ALBUMIN SERPL BCG-MCNC: 4.7 G/DL (ref 3.5–5.2)
ALP SERPL-CCNC: 36 U/L (ref 40–129)
ALT SERPL W P-5'-P-CCNC: 43 U/L (ref 10–50)
ANION GAP SERPL CALCULATED.3IONS-SCNC: 12 MMOL/L (ref 7–15)
AST SERPL W P-5'-P-CCNC: 50 U/L (ref 10–50)
BILIRUB SERPL-MCNC: 0.4 MG/DL
BUN SERPL-MCNC: 13 MG/DL (ref 8–23)
CALCIUM SERPL-MCNC: 10 MG/DL (ref 8.8–10.2)
CHLORIDE SERPL-SCNC: 104 MMOL/L (ref 98–107)
CHOLEST SERPL-MCNC: 134 MG/DL
CREAT SERPL-MCNC: 1.34 MG/DL (ref 0.67–1.17)
DEPRECATED HCO3 PLAS-SCNC: 26 MMOL/L (ref 22–29)
GFR SERPL CREATININE-BSD FRML MDRD: 58 ML/MIN/1.73M2
GLUCOSE SERPL-MCNC: 153 MG/DL (ref 70–99)
HBA1C MFR BLD: 6.4 % (ref 0–5.6)
HDLC SERPL-MCNC: 51 MG/DL
LDLC SERPL CALC-MCNC: 58 MG/DL
NONHDLC SERPL-MCNC: 83 MG/DL
POTASSIUM SERPL-SCNC: 5 MMOL/L (ref 3.4–5.3)
PROT SERPL-MCNC: 7.5 G/DL (ref 6.4–8.3)
PSA SERPL DL<=0.01 NG/ML-MCNC: <0.01 NG/ML (ref 0–4.5)
SODIUM SERPL-SCNC: 142 MMOL/L (ref 136–145)
TRIGL SERPL-MCNC: 126 MG/DL

## 2023-05-11 PROCEDURE — 80061 LIPID PANEL: CPT | Performed by: NURSE PRACTITIONER

## 2023-05-11 PROCEDURE — G0439 PPPS, SUBSEQ VISIT: HCPCS | Performed by: NURSE PRACTITIONER

## 2023-05-11 PROCEDURE — 83036 HEMOGLOBIN GLYCOSYLATED A1C: CPT | Performed by: NURSE PRACTITIONER

## 2023-05-11 PROCEDURE — 99214 OFFICE O/P EST MOD 30 MIN: CPT | Mod: 25 | Performed by: NURSE PRACTITIONER

## 2023-05-11 PROCEDURE — 80053 COMPREHEN METABOLIC PANEL: CPT | Performed by: NURSE PRACTITIONER

## 2023-05-11 PROCEDURE — G0103 PSA SCREENING: HCPCS | Performed by: NURSE PRACTITIONER

## 2023-05-11 PROCEDURE — 36415 COLL VENOUS BLD VENIPUNCTURE: CPT | Performed by: NURSE PRACTITIONER

## 2023-05-11 RX ORDER — GLIPIZIDE 5 MG/1
5 TABLET, FILM COATED, EXTENDED RELEASE ORAL DAILY
Qty: 90 TABLET | Refills: 1 | Status: SHIPPED | OUTPATIENT
Start: 2023-05-11 | End: 2023-11-14

## 2023-05-11 RX ORDER — PROPRANOLOL HCL 60 MG
60 CAPSULE, EXTENDED RELEASE 24HR ORAL DAILY
Qty: 90 CAPSULE | Refills: 3 | Status: SHIPPED | OUTPATIENT
Start: 2023-05-11 | End: 2024-06-25

## 2023-05-11 RX ORDER — FENOFIBRATE 160 MG/1
160 TABLET ORAL DAILY
Qty: 90 TABLET | Refills: 3 | Status: SHIPPED | OUTPATIENT
Start: 2023-05-11 | End: 2023-11-14

## 2023-05-11 RX ORDER — SIMVASTATIN 80 MG
TABLET ORAL
Qty: 90 TABLET | Refills: 3 | Status: SHIPPED | OUTPATIENT
Start: 2023-05-11 | End: 2024-06-25

## 2023-05-11 RX ORDER — LISINOPRIL/HYDROCHLOROTHIAZIDE 10-12.5 MG
TABLET ORAL
Qty: 90 TABLET | Refills: 3 | Status: SHIPPED | OUTPATIENT
Start: 2023-05-11 | End: 2024-06-06

## 2023-05-11 ASSESSMENT — ENCOUNTER SYMPTOMS
CHILLS: 0
FEVER: 0
WEAKNESS: 0
FREQUENCY: 0
NAUSEA: 0
JOINT SWELLING: 0
DIZZINESS: 0
PARESTHESIAS: 0
NERVOUS/ANXIOUS: 0
SORE THROAT: 0
HEADACHES: 0
COUGH: 0
SHORTNESS OF BREATH: 0
HEMATURIA: 0
ARTHRALGIAS: 0
DYSURIA: 0
PALPITATIONS: 0
MYALGIAS: 0
HEARTBURN: 0
HEMATOCHEZIA: 0
ABDOMINAL PAIN: 0
DIARRHEA: 0
CONSTIPATION: 0
EYE PAIN: 0

## 2023-05-11 ASSESSMENT — PAIN SCALES - GENERAL: PAINLEVEL: NO PAIN (0)

## 2023-05-11 ASSESSMENT — ACTIVITIES OF DAILY LIVING (ADL): CURRENT_FUNCTION: NO ASSISTANCE NEEDED

## 2023-05-11 NOTE — PATIENT INSTRUCTIONS
Updating labs today including that PSA    If towards the second half/end of summer you are still having some knee issues, follow-up with orthopedics.    Since you have had some skin history, a skin check every 2-5 years would not be a terrible idea.  Contact information for dermatology consultants is in your paperwork.      Patient Education   Personalized Prevention Plan  You are due for the preventive services outlined below.  Your care team is available to assist you in scheduling these services.  If you have already completed any of these items, please share that information with your care team to update in your medical record.  Health Maintenance Due   Topic Date Due    Urine Test  Never done    Annual Wellness Visit  01/05/2022    Eye Exam  07/16/2022    Cholesterol Lab  04/12/2023    ANNUAL REVIEW OF HM ORDERS  04/12/2023    A1C Lab  05/09/2023    Hemoglobin  04/12/2023

## 2023-05-11 NOTE — PROGRESS NOTES
"SUBJECTIVE:   Blu is a 67 year old who presents for Preventive Visit.      5/11/2023     9:02 AM   Additional Questions   Roomed by Marcy Gutiérrez CMA   Patient has been advised of split billing requirements and indicates understanding: Yes  Are you in the first 12 months of your Medicare coverage?  No    Last colonoscopy recommended submucosal dissection which will happen at [facility] in July.      Not checking sugars.  Going to [place].    Lab Results   Component Value Date    A1C 6.3 11/09/2022    A1C 6.3 04/12/2022    A1C 5.9 11/19/2021    A1C 8.0 07/20/2021    A1C 7.7 01/05/2021         Healthy Habits:     In general, how would you rate your overall health?  Excellent    Frequency of exercise:  4-5 days/week    Duration of exercise:  45-60 minutes    Do you usually eat at least 4 servings of fruit and vegetables a day, include whole grains    & fiber and avoid regularly eating high fat or \"junk\" foods?  Yes    Taking medications regularly:  Yes    Medication side effects:  None    Ability to successfully perform activities of daily living:  No assistance needed    Home Safety:  No safety concerns identified    Hearing Impairment:  No hearing concerns    In the past 6 months, have you been bothered by leaking of urine?  No    In general, how would you rate your overall mental or emotional health?  Excellent      PHQ-2 Total Score: 0    Additional concerns today:  No      Have you ever done Advance Care Planning? (For example, a Health Directive, POLST, or a discussion with a medical provider or your loved ones about your wishes): No, advance care planning information given to patient to review.  Advanced care planning was discussed at today's visit.       Fall risk  Fallen 2 or more times in the past year?: No  Any fall with injury in the past year?: No    Cognitive Screening   1) Repeat 3 items (Leader, Season, Table)    2) Clock draw: ABNORMAL Time listed  3) 3 item recall: Recalls 2 objects   Results: " ABNORMAL clock, 1-2 items recalled: PROBABLE COGNITIVE IMPAIRMENT, **INFORM PROVIDER**    Mini-CogTM Copyright ALEXIA Clayton. Licensed by the author for use in [facility]; reprinted with permission (chris@[facility]). All rights reserved.      Reviewed and updated as needed this visit by clinical staff   Tobacco  Allergies  Meds              Reviewed and updated as needed this visit by Provider                 Social History     Tobacco Use     Smoking status: Never     Smokeless tobacco: Never   Vaping Use     Vaping status: Not on file   Substance Use Topics     Alcohol use: Yes     Alcohol/week: 3.0 standard drinks of alcohol     Types: 3 Standard drinks or equivalent per week     Comment: 2 drinks/ month           5/4/2023    10:49 AM   Alcohol Use   Prescreen: >3 drinks/day or >7 drinks/week? No     Do you have a current opioid prescription? No  Do you use any other controlled substances or medications that are not prescribed by a provider? None      Current providers sharing in care for this patient include:   Patient Care Team:  Van Schulz NP as PCP - General  Van Schulz NP as Assigned PCP  Rj Bañuelos DO as Assigned Surgical Provider    The following health maintenance items are reviewed in Epic and correct as of today:  Health Maintenance   Topic Date Due     URINALYSIS  Never done     MEDICARE ANNUAL WELLNESS VISIT  01/05/2022     EYE EXAM  07/16/2022     LIPID  04/12/2023     ANNUAL REVIEW OF HM ORDERS  04/12/2023     A1C  05/09/2023     HEMOGLOBIN  04/12/2023     BMP  11/09/2023     MICROALBUMIN  11/09/2023     DIABETIC FOOT EXAM  11/09/2023     COLORECTAL CANCER SCREENING  03/13/2024     FALL RISK ASSESSMENT  05/11/2024     Pneumococcal Vaccine: 65+ Years (3 - PPSV23 if available, else PCV20) 01/05/2026     DTAP/TDAP/TD IMMUNIZATION (4 - Td or Tdap) 07/15/2026     ADVANCE CARE PLANNING  07/20/2026     PHQ-2 (once per calendar year)  Completed     INFLUENZA VACCINE  Completed  "    ZOSTER IMMUNIZATION  Completed     COVID-19 Vaccine  Completed     HEPATITIS C SCREENING  Addressed     IPV IMMUNIZATION  Aged Out     MENINGITIS IMMUNIZATION  Aged Out     AORTIC ANEURYSM SCREENING (SYSTEM ASSIGNED)  Discontinued     Lab work is in process      Review of Systems   Constitutional: Negative for chills and fever.   HENT: Negative for congestion, ear pain, hearing loss and sore throat.    Eyes: Negative for pain and visual disturbance.   Respiratory: Negative for cough and shortness of breath.    Cardiovascular: Negative for chest pain, palpitations and peripheral edema.   Gastrointestinal: Negative for abdominal pain, constipation, diarrhea, heartburn, hematochezia and nausea.   Genitourinary: Negative for dysuria, frequency, genital sores, hematuria, impotence, penile discharge and urgency.   Musculoskeletal: Negative for arthralgias, joint swelling and myalgias.   Skin: Negative for rash.   Neurological: Negative for dizziness, weakness, headaches and paresthesias.   Psychiatric/Behavioral: Negative for mood changes. The patient is not nervous/anxious.        OBJECTIVE:   /74 (BP Location: Left arm, Patient Position: Sitting, Cuff Size: Adult Regular)   Pulse 55   Temp 98.3  F (36.8  C) (Oral)   Resp 16   Ht 1.721 m (5' 7.75\")   Wt 90.9 kg (200 lb 6.4 oz)   SpO2 98%   BMI 30.70 kg/m   Estimated body mass index is 30.7 kg/m  as calculated from the following:    Height as of this encounter: 1.721 m (5' 7.75\").    Weight as of this encounter: 90.9 kg (200 lb 6.4 oz).  Physical Exam  GENERAL: healthy, alert and no distress  EYES: Eyes grossly normal to inspection, PERRL and conjunctivae and sclerae normal  HENT: ear canals and TM's normal, nose and mouth without ulcers or lesions  NECK: no adenopathy, no asymmetry, masses, or scars and thyroid normal to palpation  RESP: lungs clear to auscultation - no rales, rhonchi or wheezes  CV: regular rate and rhythm, normal S1 S2, no S3 or S4, no " murmur, click or rub, no peripheral edema and peripheral pulses strong  ABDOMEN: soft, nontender, no hepatosplenomegaly, no masses and bowel sounds normal  MS: no gross musculoskeletal defects noted, no edema  SKIN: no suspicious lesions or rashes  NEURO: Normal strength and tone, mentation intact and speech normal  PSYCH: mentation appears normal, affect normal/bright    Diagnostic Test Results:  Labs reviewed in Epic    ASSESSMENT / PLAN:       ICD-10-CM    1. Malignant tumor of prostate (H)  C61       2. Pure hypercholesterolemia  E78.00 Lipid panel reflex to direct LDL Non-fasting     fenofibrate (TRIGLIDE/LOFIBRA) 160 MG tablet     simvastatin (ZOCOR) 80 MG tablet      3. Type 2 diabetes mellitus with stage 3a chronic kidney disease, without long-term current use of insulin (H)  E11.22 HEMOGLOBIN A1C    N18.31 glipiZIDE (GLUCOTROL XL) 5 MG 24 hr tablet     metFORMIN (GLUCOPHAGE) 1000 MG tablet     Comprehensive metabolic panel (BMP + Alb, Alk Phos, ALT, AST, Total. Bili, TP)      4. Stage 3a chronic kidney disease (H)  N18.31 Comprehensive metabolic panel (BMP + Alb, Alk Phos, ALT, AST, Total. Bili, TP)     CANCELED: UA Macroscopic with reflex to Microscopic and Culture      5. Gastroesophageal reflux disease with esophagitis without hemorrhage  K21.00 omeprazole (PRILOSEC) 20 MG DR capsule      6. Essential tremor  G25.0 propranolol ER (INDERAL LA) 60 MG 24 hr capsule      7. Essential hypertension, benign  I10 lisinopril-hydrochlorothiazide (ZESTORETIC) 10-12.5 MG tablet      8. Basal cell carcinoma (BCC), unspecified site  C44.91       9. Screening for prostate cancer  Z12.5 PSA, screen      10. History of basal cell carcinoma  Z85.828 Adult Dermatology Referral        Doing well.  Update diabetic labs.  In August he will be 5 years out from prostate cancer treatment and will revert back to once a year PSAs.  Continue same antihypertensives and diabetic medications.  Dermatology referral for general skin  "check given history of basal cell carcinoma.  NADINE signed to get eye exam report that was performed 6 months ago.      COUNSELING:  Reviewed preventive health counseling, as reflected in patient instructions      BMI:   Estimated body mass index is 30.7 kg/m  as calculated from the following:    Height as of this encounter: 1.721 m (5' 7.75\").    Weight as of this encounter: 90.9 kg (200 lb 6.4 oz).   Weight management plan: Discussed healthy diet and exercise guidelines      He reports that he has never smoked. He has never used smokeless tobacco.      Appropriate preventive services were discussed with this patient, including applicable screening as appropriate for cardiovascular disease, diabetes, osteopenia/osteoporosis, and glaucoma.  As appropriate for age/gender, discussed screening for colorectal cancer, prostate cancer, breast cancer, and cervical cancer. Checklist reviewing preventive services available has been given to the patient.    Reviewed patients plan of care and provided an AVS. The Basic Care Plan (routine screening as documented in Health Maintenance) for Blu meets the Care Plan requirement. This Care Plan has been established and reviewed with the Patient.      Van Schulz NP  [facility]    Identified Health Risks:    I have reviewed Opioid Use Disorder and Substance Use Disorder risk factors and made any needed referrals.     "

## 2023-07-06 ENCOUNTER — OFFICE VISIT (OUTPATIENT)
Dept: FAMILY MEDICINE | Facility: CLINIC | Age: 68
End: 2023-07-06
Payer: MEDICARE

## 2023-07-06 VITALS
TEMPERATURE: 97.7 F | RESPIRATION RATE: 18 BRPM | OXYGEN SATURATION: 98 % | HEIGHT: 67 IN | BODY MASS INDEX: 30.92 KG/M2 | HEART RATE: 56 BPM | SYSTOLIC BLOOD PRESSURE: 122 MMHG | WEIGHT: 197 LBS | DIASTOLIC BLOOD PRESSURE: 62 MMHG

## 2023-07-06 DIAGNOSIS — E66.811 CLASS 1 OBESITY DUE TO EXCESS CALORIES WITH SERIOUS COMORBIDITY AND BODY MASS INDEX (BMI) OF 31.0 TO 31.9 IN ADULT: ICD-10-CM

## 2023-07-06 DIAGNOSIS — Z86.0100 HISTORY OF COLONIC POLYPS: ICD-10-CM

## 2023-07-06 DIAGNOSIS — E66.09 CLASS 1 OBESITY DUE TO EXCESS CALORIES WITH SERIOUS COMORBIDITY AND BODY MASS INDEX (BMI) OF 31.0 TO 31.9 IN ADULT: ICD-10-CM

## 2023-07-06 DIAGNOSIS — E11.9 TYPE 2 DIABETES MELLITUS WITHOUT COMPLICATION, WITHOUT LONG-TERM CURRENT USE OF INSULIN (H): ICD-10-CM

## 2023-07-06 DIAGNOSIS — N18.31 STAGE 3A CHRONIC KIDNEY DISEASE (H): ICD-10-CM

## 2023-07-06 DIAGNOSIS — I10 BENIGN ESSENTIAL HYPERTENSION: ICD-10-CM

## 2023-07-06 DIAGNOSIS — Z01.818 PREOP GENERAL PHYSICAL EXAM: Primary | ICD-10-CM

## 2023-07-06 PROCEDURE — 99214 OFFICE O/P EST MOD 30 MIN: CPT | Performed by: NURSE PRACTITIONER

## 2023-07-06 ASSESSMENT — PAIN SCALES - GENERAL: PAINLEVEL: NO PAIN (0)

## 2023-07-06 NOTE — PATIENT INSTRUCTIONS
I will get your paperwork faxed to your surgeon.    Follow your surgeon's directions regarding eating and drinking prior to surgery.     Medications you can take the morning of surgery include any inhalers and: propranolol and omeprazole.  If you struggle with taking medicine on an empty stomach you can also skip this until you get back home    For your diabetic medications: hold your glipizide during your prep.    Your surgeon will manage any complications after your procedure.

## 2023-07-06 NOTE — PROGRESS NOTES
[facility]  [address]  [place] [facility] [address]  Phone: [phone]  Fax: [phone]  Primary Provider: Rory Schulz  Pre-op Performing Provider: RORY SCHULZ    PREOPERATIVE EVALUATION:  Today's date: 7/6/2023    Blu Allen is a 67 year old male who presents for a preoperative evaluation.      7/6/2023     9:06 AM   Additional Questions   Roomed by Stephanie HALE CMA     Surgical Information:  Surgery/Procedure: Removal of colon polyp  Surgery Location: [facility]  Surgeon: Dr Moises Choudhury  Surgery Date: 7/12  Time of Surgery: 9:00  Where patient plans to recover: At home with family  Fax number for surgical facility: [phone]    Assessment & Plan     The proposed surgical procedure is considered LOW risk.    Preop general physical exam  Medically optimized for surgery    History of colonic polyps  Planned submucosal resection    Stage 3a chronic kidney disease (H)  Stable    Benign essential hypertension  Well controlled    Type 2 diabetes mellitus without complication, without long-term current use of insulin (H)  Controlled    Class 1 obesity due to excess calories with serious comorbidity and body mass index (BMI) of 31.0 to 31.9 in adult  Increased risk of obesity related hypoventilation      Hold glipizide during prep.  Okay to take omeprazole and propranolol morning of procedure with small sip of water.  If too bloated, hold until back home.    RECOMMENDATION:  APPROVAL GIVEN to proceed with proposed procedure, without further diagnostic evaluation.    Reviewed colonoscopy x1, pathology report x1    Subjective       HPI related to upcoming procedure: Patient has known colon polyp that requires deeper submucosal extraction.  This will be done at the hospital.        7/6/2023     9:05 AM   Preop Questions   1. Have you ever had a heart attack or stroke? No   2. Have you ever had surgery on your heart or  blood vessels, such as a stent placement, a coronary artery bypass, or surgery on an artery in your head, neck, heart, or legs? No   3. Do you have chest pain with activity? No   4. Do you have a history of  heart failure? No   5. Do you currently have a cold, bronchitis or symptoms of other infection? No   6. Do you have a cough, shortness of breath, or wheezing? No   7. Do you or anyone in your family have previous history of blood clots? No   8. Do you or does anyone in your family have a serious bleeding problem such as prolonged bleeding following surgeries or cuts? No   9. Have you ever had problems with anemia or been told to take iron pills? No   10. Have you had any abnormal blood loss such as black, tarry or bloody stools? No   11. Have you ever had a blood transfusion? No   12. Are you willing to have a blood transfusion if it is medically needed before, during, or after your surgery? Yes   13. Have you or any of your relatives ever had problems with anesthesia? No   14. Do you have sleep apnea, excessive snoring or daytime drowsiness? No   15. Do you have any artifical heart valves or other implanted medical devices like a pacemaker, defibrillator, or continuous glucose monitor? No   16. Do you have artificial joints? No   17. Are you allergic to latex? No       Health Care Directive:  Patient does not have a Health Care Directive or Living Will: Previously reviewed    Preoperative Review of :   reviewed - no record of controlled substances prescribed.    Status of Chronic Conditions:  See problem list for active medical problems.  Problems all longstanding and stable, except as noted/documented.  See ROS for pertinent symptoms related to these conditions.      Review of Systems  CONSTITUTIONAL: NEGATIVE for fever, chills, change in weight  INTEGUMENTARY/SKIN: NEGATIVE for worrisome rashes, moles or lesions  EYES: NEGATIVE for vision changes or irritation  ENT/MOUTH: NEGATIVE for ear, mouth and  throat problems  RESP: NEGATIVE for significant cough or SOB  CV: NEGATIVE for chest pain, palpitations or peripheral edema  GI: NEGATIVE for nausea, abdominal pain, heartburn, or change in bowel habits  : NEGATIVE for frequency, dysuria, or hematuria  MUSCULOSKELETAL: NEGATIVE for significant arthralgias or myalgia  NEURO: NEGATIVE for weakness, dizziness or paresthesias  ENDOCRINE: NEGATIVE for temperature intolerance, skin/hair changes  HEME: NEGATIVE for bleeding problems  PSYCHIATRIC: NEGATIVE for changes in mood or affect    Patient Active Problem List    Diagnosis Date Noted     Sebaceous cyst 05/09/2022     Priority: Medium     Added automatically from request for surgery [id number]       Chronic kidney disease, stage 3 (H) 07/20/2021     Priority: Medium     Class 1 obesity due to excess calories with serious comorbidity and body mass index (BMI) of 31.0 to 31.9 in adult 07/20/2021     Priority: Medium     Stress incontinence 12/13/2018     Priority: Medium     Malignant tumor of prostate (H) 07/27/2018     Priority: Medium     ED (erectile dysfunction) 04/17/2018     Priority: Medium     Type 2 diabetes mellitus (H)      Priority: Medium     Created by Conversion      Formatting of this note might be different from the original.  Created by Conversion       Basal cell carcinoma      Priority: Medium     Back skin lesion 04/06/2016     Priority: Medium     Primary hypercholesterolemia      Priority: Medium     Created by Conversion      Formatting of this note might be different from the original.  Created by Conversion       Benign essential hypertension      Priority: Medium     Created by Conversion      Formatting of this note might be different from the original.  Created by Conversion        Past Medical History:   Diagnosis Date     Cancer (H) 04/01/2017    BCC on back     Diabetes mellitus (H)      GERD (gastroesophageal reflux disease)      High cholesterol      Hypertension      Past Surgical  History:   Procedure Laterality Date     COLONOSCOPY  12/2021     EXCISE GANGLION WRIST       EXCISE MASS TRUNK N/A 06/07/2022    Procedure: EXCISION, MASS, TORSO- back;  Surgeon: Rj Bañuelos DO;  Location: [place] [facility]     OTHER SURGICAL HISTORY      vastectomy     PILONIDAL CYST / SINUS EXCISION       WY LAP,PROSTATECTOMY,RADICAL,W/NERVE SPARE,INCL ROBOTIC Bilateral 08/28/2018    Procedure: ROBOTIC ASSISTED RADICAL RETROPUBIC PROSTATECTOMY BILATERAL PELVIC LYMPH NODE DISSECTION LYSIS OF ADHESIONS;  Surgeon: Abhishek Phillips MD;  Location: [facility] [place];  Service: Urology     RELEASE TRIGGER FINGER       Current Outpatient Medications   Medication Sig Dispense Refill     aspirin 81 MG EC tablet [ASPIRIN 81 MG EC TABLET] Take 81 mg by mouth every evening.        fenofibrate (TRIGLIDE/LOFIBRA) 160 MG tablet Take 1 tablet (160 mg) by mouth daily 90 tablet 3     glipiZIDE (GLUCOTROL XL) 5 MG 24 hr tablet Take 1 tablet (5 mg) by mouth daily 90 tablet 1     lisinopril-hydrochlorothiazide (ZESTORETIC) 10-12.5 MG tablet Take 1 tablet by mouth once daily 90 tablet 3     metFORMIN (GLUCOPHAGE) 1000 MG tablet Take 1 tablet (1,000 mg) by mouth 2 times daily (with meals) 180 tablet 1     Multiple Vitamins-Minerals (MULTIVITAL PO)        omeprazole (PRILOSEC) 20 MG DR capsule Take 1 capsule (20 mg) by mouth daily 90 capsule 3     propranolol ER (INDERAL LA) 60 MG 24 hr capsule Take 1 capsule (60 mg) by mouth daily 90 capsule 3     simvastatin (ZOCOR) 80 MG tablet TAKE ONE TABLET BY MOUTH AT BEDTIME 90 tablet 3       No Known Allergies     Social History     Tobacco Use     Smoking status: Never     Passive exposure: Never     Smokeless tobacco: Never   Substance Use Topics     Alcohol use: Yes     Alcohol/week: 3.0 standard drinks of alcohol     Types: 3 Standard drinks or equivalent per week     Comment: 2 drinks/ month     Family History   Problem Relation Age of Onset     Lung Cancer Father      Diabetes  "Mother      Hypertension Mother      Hyperlipidemia Mother      No Known Problems Sister      No Known Problems Sister      No Known Problems Sister      Stomach Cancer Brother      No Known Problems Brother      No Known Problems Brother      No Known Problems Brother      History   Drug Use No         Objective     /62 (BP Location: Right arm, Patient Position: Sitting, Cuff Size: Adult Regular)   Pulse 56   Temp 97.7  F (36.5  C) (Oral)   Resp 18   Ht 1.689 m (5' 6.5\")   Wt 89.4 kg (197 lb)   SpO2 98%   BMI 31.32 kg/m      Physical Exam    GENERAL APPEARANCE: healthy, alert and no distress     EYES: EOMI,  PERRL     HENT: ear canals and TM's normal and nose and mouth without ulcers or lesions     NECK: no adenopathy, no asymmetry, masses, or scars and thyroid normal to palpation     RESP: lungs clear to auscultation - no rales, rhonchi or wheezes     CV: regular rates and rhythm, normal S1 S2, no S3 or S4 and no murmur, click or rub     ABDOMEN:  soft, nontender, no HSM or masses and bowel sounds normal     MS: extremities normal- no gross deformities noted, no evidence of inflammation in joints, FROM in all extremities.     SKIN: no suspicious lesions or rashes     NEURO: Normal strength and tone, sensory exam grossly normal, mentation intact and speech normal     PSYCH: mentation appears normal. and affect normal/bright     LYMPHATICS: No cervical adenopathy    Recent Labs   Lab Test 05/11/23  0953 11/09/22  1437 04/12/22  1027   HGB  --   --  14.1    141 141   POTASSIUM 5.0 4.9 4.8   CR 1.34* 1.19* 1.44*   A1C 6.4* 6.3* 6.3*        Diagnostics:  Last Comprehensive Metabolic Panel:  Sodium   Date Value Ref Range Status   05/11/2023 142 136 - 145 mmol/L Final     Potassium   Date Value Ref Range Status   05/11/2023 5.0 3.4 - 5.3 mmol/L Final   04/12/2022 4.8 3.5 - 5.0 mmol/L Final     Chloride   Date Value Ref Range Status   05/11/2023 104 98 - 107 mmol/L Final   04/12/2022 104 98 - 107 mmol/L " Final     Carbon Dioxide (CO2)   Date Value Ref Range Status   05/11/2023 26 22 - 29 mmol/L Final   04/12/2022 26 22 - 31 mmol/L Final     Anion Gap   Date Value Ref Range Status   05/11/2023 12 7 - 15 mmol/L Final   04/12/2022 11 5 - 18 mmol/L Final     Glucose   Date Value Ref Range Status   05/11/2023 153 (H) 70 - 99 mg/dL Final   04/12/2022 138 (H) 70 - 125 mg/dL Final     GLUCOSE BY METER POCT   Date Value Ref Range Status   06/07/2022 139 (A) 70 - 99 mg/dL Final     Comment:     Lot: [id number] Exp: 08/24/22     Urea Nitrogen   Date Value Ref Range Status   05/11/2023 13.0 8.0 - 23.0 mg/dL Final   04/12/2022 14 8 - 22 mg/dL Final     Creatinine   Date Value Ref Range Status   05/11/2023 1.34 (H) 0.67 - 1.17 mg/dL Final     GFR Estimate   Date Value Ref Range Status   05/11/2023 58 (L) >60 mL/min/1.73m2 Final     Comment:     eGFR calculated using 2021 CKD-EPI equation.   01/05/2021 51 (L) >60 mL/min/1.73m2 Final     Calcium   Date Value Ref Range Status   05/11/2023 10.0 8.8 - 10.2 mg/dL Final     Bilirubin Total   Date Value Ref Range Status   05/11/2023 0.4 <=1.2 mg/dL Final     Alkaline Phosphatase   Date Value Ref Range Status   05/11/2023 36 (L) 40 - 129 U/L Final     ALT   Date Value Ref Range Status   05/11/2023 43 10 - 50 U/L Final     AST   Date Value Ref Range Status   05/11/2023 50 10 - 50 U/L Final     Hemoglobin A1C   Date Value Ref Range Status   05/11/2023 6.4 (H) 0.0 - 5.6 % Final     Comment:     Normal <5.7%   Prediabetes 5.7-6.4%    Diabetes 6.5% or higher     Note: Adopted from ADA consensus guidelines.               No EKG required, no history of coronary heart disease, significant arrhythmia, peripheral arterial disease or other structural heart disease.    Revised Cardiac Risk Index (RCRI):  The patient has the following serious cardiovascular risks for perioperative complications:   - No serious cardiac risks = 0 points     RCRI Interpretation: 0 points: Class I (very low risk - 0.4%  complication rate)       Signed Electronically by: Van Schulz NP  Copy of this evaluation report is provided to requesting physician.

## 2023-08-03 ENCOUNTER — TRANSFERRED RECORDS (OUTPATIENT)
Dept: HEALTH INFORMATION MANAGEMENT | Facility: CLINIC | Age: 68
End: 2023-08-03
Payer: MEDICARE

## 2023-08-07 ENCOUNTER — TELEPHONE (OUTPATIENT)
Dept: FAMILY MEDICINE | Facility: CLINIC | Age: 68
End: 2023-08-07

## 2023-08-07 ENCOUNTER — OFFICE VISIT (OUTPATIENT)
Dept: FAMILY MEDICINE | Facility: CLINIC | Age: 68
End: 2023-08-07
Payer: MEDICARE

## 2023-08-07 VITALS
OXYGEN SATURATION: 98 % | RESPIRATION RATE: 14 BRPM | HEIGHT: 67 IN | SYSTOLIC BLOOD PRESSURE: 124 MMHG | DIASTOLIC BLOOD PRESSURE: 68 MMHG | BODY MASS INDEX: 30.92 KG/M2 | HEART RATE: 71 BPM | TEMPERATURE: 98.2 F | WEIGHT: 197 LBS

## 2023-08-07 DIAGNOSIS — C43.9 MELANOMA OF SKIN (H): ICD-10-CM

## 2023-08-07 DIAGNOSIS — Z01.818 PREOP GENERAL PHYSICAL EXAM: Primary | ICD-10-CM

## 2023-08-07 DIAGNOSIS — C61 MALIGNANT TUMOR OF PROSTATE (H): ICD-10-CM

## 2023-08-07 DIAGNOSIS — G25.0 ESSENTIAL TREMOR: ICD-10-CM

## 2023-08-07 DIAGNOSIS — I10 BENIGN ESSENTIAL HYPERTENSION: ICD-10-CM

## 2023-08-07 DIAGNOSIS — E11.9 TYPE 2 DIABETES MELLITUS WITHOUT COMPLICATION, WITHOUT LONG-TERM CURRENT USE OF INSULIN (H): ICD-10-CM

## 2023-08-07 DIAGNOSIS — N18.31 STAGE 3A CHRONIC KIDNEY DISEASE (H): ICD-10-CM

## 2023-08-07 DIAGNOSIS — E78.00 PRIMARY HYPERCHOLESTEROLEMIA: ICD-10-CM

## 2023-08-07 LAB
ANION GAP SERPL CALCULATED.3IONS-SCNC: 13 MMOL/L (ref 7–15)
BUN SERPL-MCNC: 18.2 MG/DL (ref 8–23)
CALCIUM SERPL-MCNC: 9.6 MG/DL (ref 8.8–10.2)
CHLORIDE SERPL-SCNC: 103 MMOL/L (ref 98–107)
CREAT SERPL-MCNC: 1.26 MG/DL (ref 0.67–1.17)
DEPRECATED HCO3 PLAS-SCNC: 24 MMOL/L (ref 22–29)
ERYTHROCYTE [DISTWIDTH] IN BLOOD BY AUTOMATED COUNT: 11.8 % (ref 10–15)
GFR SERPL CREATININE-BSD FRML MDRD: 62 ML/MIN/1.73M2
GLUCOSE SERPL-MCNC: 320 MG/DL (ref 70–99)
HCT VFR BLD AUTO: 38.1 % (ref 40–53)
HGB BLD-MCNC: 13.1 G/DL (ref 13.3–17.7)
MCH RBC QN AUTO: 31.9 PG (ref 26.5–33)
MCHC RBC AUTO-ENTMCNC: 34.4 G/DL (ref 31.5–36.5)
MCV RBC AUTO: 93 FL (ref 78–100)
PLATELET # BLD AUTO: 270 10E3/UL (ref 150–450)
POTASSIUM SERPL-SCNC: 4.6 MMOL/L (ref 3.4–5.3)
RBC # BLD AUTO: 4.11 10E6/UL (ref 4.4–5.9)
SODIUM SERPL-SCNC: 140 MMOL/L (ref 136–145)
WBC # BLD AUTO: 6.6 10E3/UL (ref 4–11)

## 2023-08-07 PROCEDURE — 36415 COLL VENOUS BLD VENIPUNCTURE: CPT | Performed by: PHYSICIAN ASSISTANT

## 2023-08-07 PROCEDURE — 80048 BASIC METABOLIC PNL TOTAL CA: CPT | Performed by: PHYSICIAN ASSISTANT

## 2023-08-07 PROCEDURE — 85027 COMPLETE CBC AUTOMATED: CPT | Performed by: PHYSICIAN ASSISTANT

## 2023-08-07 PROCEDURE — 99214 OFFICE O/P EST MOD 30 MIN: CPT | Performed by: PHYSICIAN ASSISTANT

## 2023-08-07 PROCEDURE — 93005 ELECTROCARDIOGRAM TRACING: CPT | Performed by: PHYSICIAN ASSISTANT

## 2023-08-07 PROCEDURE — 93005 ELECTROCARDIOGRAM TRACING: CPT | Mod: 76 | Performed by: NURSE PRACTITIONER

## 2023-08-07 ASSESSMENT — ENCOUNTER SYMPTOMS
SHORTNESS OF BREATH: 0
ABDOMINAL PAIN: 0
HEADACHES: 0
JOINT SWELLING: 0
HEMATURIA: 0
FEVER: 0
DIZZINESS: 0
EYE PAIN: 0
ARTHRALGIAS: 0
DIARRHEA: 0
FREQUENCY: 0
PALPITATIONS: 0
HEARTBURN: 0
SORE THROAT: 0
CHILLS: 0
NAUSEA: 0
COUGH: 0
NERVOUS/ANXIOUS: 0
WEAKNESS: 0
MYALGIAS: 0
CONSTIPATION: 0
DYSURIA: 0
PARESTHESIAS: 0
HEMATOCHEZIA: 0

## 2023-08-07 NOTE — TELEPHONE ENCOUNTER
Pt seen for preop on 8/7/23 with Scooter for procedure that is on Wednesday Aug 9th. Pt needs EKG. Ok to come in for MA visit to get EKG with Monday 8/7/23 or Tuesday Aug 8.    Pepper [address] MA on 8/7/2023 at 2:41 PM

## 2023-08-07 NOTE — PROGRESS NOTES
[facility]  [address]  [place] [facility] [address]  Phone: [phone]  Fax: [phone]  Primary Provider: Van Schulz  Pre-op Performing Provider: RAFA ROCKWELL    PREOPERATIVE EVALUATION:  Today's date: 8/7/2023    Blu Allen is a 67 year old male who presents for a preoperative evaluation.      8/7/2023     2:11 PM   Additional Questions   Roomed by Pepper Way   Accompanied by none       Surgical Information:  Surgery/Procedure: removal of melanoma, R side of chest  Surgery Location: [facility]  Surgeon: Quinten Mckeon MD   Surgery Date: 8/9/23  Time of Surgery: 1030am  Where patient plans to recover: At home with family  Fax number for surgical facility: [phone]    Assessment & Plan     The proposed surgical procedure is considered LOW risk.    Preop general physical exam  Melanoma of skin (H)  Blu will be undergoing tissue removal and lymph node resection to his left abdomen/chest due to melanoma on 8/9/2023.  He denies any chest pain, shortness of breath, lightheaded or dizziness.  We will update CBC and a complete metabolic panel today  - CBC with platelets; Future  - Basic metabolic panel; Future  - CBC with platelets  - Basic metabolic panel    Type 2 diabetes mellitus without complication, without long-term current use of insulin (H)  Last A1c as of 5/23 was stable at 6.4.  We will check a complete metabolic panel today he is to continue to watch diet    Stage 3a chronic kidney disease (H)  Kidney function has remained stable.  Blood pressure is well-controlled.  He is to avoid nephrotoxic medications.  Will check kidney function today    Primary hypercholesterolemia  Last lipid panel as of 5/23 was stable with a low LDL of 58, triglyceride of 126, and HDL 51.  He is to continue with cholesterol lowering medication.  Continue watch diet and stay active    Benign essential  hypertension  Blood pressure well-controlled at 124/68 today.  Continue lisinopril/hydrochlorothiazide.    Essential tremor  Stable at this time.  He is currently on propranolol.    Malignant tumor of prostate (H)  Status post total prostatectomy.  Last PSA as of 5/23 was undetectable.     - No identified additional risk factors other than previously addressed    Antiplatelet or Anticoagulation Medication Instructions:   - Patient is on no antiplatelet or anticoagulation medications.    Additional Medication Instructions:  Hold all NSAIDs, herbal supplements, and multivitamins starting today.    RECOMMENDATION:  APPROVAL GIVEN to proceed with proposed procedure, without further diagnostic evaluation.    Primary care note reviewed x 1, labs reviewed x 4    Subjective     HPI related to upcoming procedure: Blu is a pleasant 67-year-old male who presents to the clinic today for a preop physical.  He will be undergoing tissue removal to his left abdomen/chest and a lymph node resection.  Approximately 3 weeks ago he underwent removal of a lesion to his left abdomen/chest region which came back as melanoma.  They are going to go back in and remove more tissue and resect the lymph node.  Past medical history significant for hypertension, hyperlipidemia, type 2 diabetes, chronic kidney disease, GERD, prostate cancer, and essential tremor.  He denies any chest pain, shortness of breath, lightheaded or dizziness.  He is otherwise feeling well today.        8/7/2023     2:01 PM   Preop Questions   1. Have you ever had a heart attack or stroke? No   2. Have you ever had surgery on your heart or blood vessels, such as a stent placement, a coronary artery bypass, or surgery on an artery in your head, neck, heart, or legs? No   3. Do you have chest pain with activity? No   4. Do you have a history of  heart failure? No   5. Do you currently have a cold, bronchitis or symptoms of other infection? No   6. Do you have a cough,  shortness of breath, or wheezing? No   7. Do you or anyone in your family have previous history of blood clots? No   8. Do you or does anyone in your family have a serious bleeding problem such as prolonged bleeding following surgeries or cuts? No   9. Have you ever had problems with anemia or been told to take iron pills? No   10. Have you had any abnormal blood loss such as black, tarry or bloody stools? No   11. Have you ever had a blood transfusion? No   12. Are you willing to have a blood transfusion if it is medically needed before, during, or after your surgery? Yes   13. Have you or any of your relatives ever had problems with anesthesia? No   14. Do you have sleep apnea, excessive snoring or daytime drowsiness? No   15. Do you have any artifical heart valves or other implanted medical devices like a pacemaker, defibrillator, or continuous glucose monitor? No   16. Do you have artificial joints? No   17. Are you allergic to latex? No       Health Care Directive:  Patient does not have a Health Care Directive or Living Will: Discussed advance care planning with patient; however, patient declined at this time.      Review of Systems   Constitutional:  Negative for chills and fever.   HENT:  Negative for congestion, ear pain, hearing loss and sore throat.    Eyes:  Negative for pain and visual disturbance.   Respiratory:  Negative for cough and shortness of breath.    Cardiovascular:  Negative for chest pain, palpitations and peripheral edema.   Gastrointestinal:  Negative for abdominal pain, constipation, diarrhea, heartburn, hematochezia and nausea.   Genitourinary:  Negative for dysuria, frequency, genital sores, hematuria, impotence, penile discharge and urgency.   Musculoskeletal:  Negative for arthralgias, joint swelling and myalgias.   Skin:  Negative for rash.   Neurological:  Negative for dizziness, weakness, headaches and paresthesias.   Psychiatric/Behavioral:  Negative for mood changes. The patient  is not nervous/anxious.          Patient Active Problem List    Diagnosis Date Noted    Sebaceous cyst 05/09/2022     Priority: Medium     Added automatically from request for surgery [id number]      Chronic kidney disease, stage 3 (H) 07/20/2021     Priority: Medium    Class 1 obesity due to excess calories with serious comorbidity and body mass index (BMI) of 31.0 to 31.9 in adult 07/20/2021     Priority: Medium    Stress incontinence 12/13/2018     Priority: Medium    Malignant tumor of prostate (H) 07/27/2018     Priority: Medium    ED (erectile dysfunction) 04/17/2018     Priority: Medium    Type 2 diabetes mellitus (H)      Priority: Medium     Created by Conversion      Formatting of this note might be different from the original.  Created by Conversion      Basal cell carcinoma      Priority: Medium    Back skin lesion 04/06/2016     Priority: Medium    Primary hypercholesterolemia      Priority: Medium     Created by Conversion      Formatting of this note might be different from the original.  Created by Conversion      Benign essential hypertension      Priority: Medium     Created by Conversion      Formatting of this note might be different from the original.  Created by Conversion        Past Medical History:   Diagnosis Date    Cancer (H) 04/01/2017    BCC on back    Diabetes mellitus (H)     GERD (gastroesophageal reflux disease)     High cholesterol     Hypertension      Past Surgical History:   Procedure Laterality Date    COLONOSCOPY  12/2021    EXCISE GANGLION WRIST      EXCISE MASS TRUNK N/A 06/07/2022    Procedure: EXCISION, MASS, TORSO- back;  Surgeon: Rj Bañuelos DO;  Location: [facility]    OTHER SURGICAL HISTORY      vastectomy    PILONIDAL CYST / SINUS EXCISION      MI LAP,PROSTATECTOMY,RADICAL,W/NERVE SPARE,INCL ROBOTIC Bilateral 08/28/2018    Procedure: ROBOTIC ASSISTED RADICAL RETROPUBIC PROSTATECTOMY BILATERAL PELVIC LYMPH NODE DISSECTION LYSIS OF ADHESIONS;  Surgeon:  "Abhishek Phillips MD;  Location: [facility];  Service: Urology    RELEASE TRIGGER FINGER       Current Outpatient Medications   Medication Sig Dispense Refill    aspirin 81 MG EC tablet [ASPIRIN 81 MG EC TABLET] Take 81 mg by mouth every evening.       fenofibrate (TRIGLIDE/LOFIBRA) 160 MG tablet Take 1 tablet (160 mg) by mouth daily 90 tablet 3    glipiZIDE (GLUCOTROL XL) 5 MG 24 hr tablet Take 1 tablet (5 mg) by mouth daily 90 tablet 1    lisinopril-hydrochlorothiazide (ZESTORETIC) 10-12.5 MG tablet Take 1 tablet by mouth once daily 90 tablet 3    metFORMIN (GLUCOPHAGE) 1000 MG tablet Take 1 tablet (1,000 mg) by mouth 2 times daily (with meals) 180 tablet 1    Multiple Vitamins-Minerals (MULTIVITAL PO)       omeprazole (PRILOSEC) 20 MG DR capsule Take 1 capsule (20 mg) by mouth daily 90 capsule 3    propranolol ER (INDERAL LA) 60 MG 24 hr capsule Take 1 capsule (60 mg) by mouth daily 90 capsule 3    simvastatin (ZOCOR) 80 MG tablet TAKE ONE TABLET BY MOUTH AT BEDTIME 90 tablet 3       No Known Allergies     Social History     Tobacco Use    Smoking status: Never     Passive exposure: Never    Smokeless tobacco: Never   Substance Use Topics    Alcohol use: Yes     Alcohol/week: 3.0 standard drinks of alcohol     Types: 3 Standard drinks or equivalent per week     Comment: 2 drinks/ month     Family History   Problem Relation Age of Onset    Lung Cancer Father     Diabetes Mother     Hypertension Mother     Hyperlipidemia Mother     No Known Problems Sister     No Known Problems Sister     No Known Problems Sister     Stomach Cancer Brother     No Known Problems Brother     No Known Problems Brother     No Known Problems Brother      History   Drug Use No         Objective     /68 (BP Location: Left arm, Patient Position: Sitting, Cuff Size: Adult Regular)   Pulse 71   Temp 98.2  F (36.8  C) (Oral)   Resp 14   Ht 1.689 m (5' 6.5\")   Wt 89.4 kg (197 lb)   SpO2 98%   BMI 31.32 kg/m      Physical " Exam  Vitals and nursing note reviewed.   Constitutional:       General: He is not in acute distress.     Appearance: Normal appearance. He is well-developed and well-groomed. He is not ill-appearing or toxic-appearing.   HENT:      Head: Normocephalic and atraumatic.      Right Ear: Tympanic membrane, ear canal and external ear normal.      Left Ear: Tympanic membrane, ear canal and external ear normal.      Mouth/Throat:      Lips: Pink.      Mouth: Mucous membranes are moist.      Palate: No mass.      Pharynx: Oropharynx is clear. Uvula midline.      Tonsils: No tonsillar exudate or tonsillar abscesses.   Eyes:      General: Lids are normal.         Right eye: No discharge.         Left eye: No discharge.   Neck:      Trachea: Trachea normal.   Cardiovascular:      Rate and Rhythm: Normal rate and regular rhythm.      Heart sounds: S1 normal and S2 normal. No murmur heard.  Pulmonary:      Effort: Pulmonary effort is normal.      Breath sounds: Normal breath sounds and air entry.   Abdominal:      General: Bowel sounds are normal. There is no distension.      Palpations: Abdomen is soft.      Tenderness: There is no abdominal tenderness. There is no guarding or rebound.   Musculoskeletal:      Cervical back: Full passive range of motion without pain and neck supple.      Right lower leg: No edema.      Left lower leg: No edema.   Lymphadenopathy:      Cervical: No cervical adenopathy.   Skin:     General: Skin is warm and dry.      Findings: No lesion or rash.      Comments: Well-healed incision to left abdomen/chest.   Neurological:      General: No focal deficit present.      Mental Status: He is alert.      Cranial Nerves: No cranial nerve deficit.      Gait: Gait is intact.      Deep Tendon Reflexes:      Reflex Scores:       Patellar reflexes are 2+ on the right side and 2+ on the left side.  Psychiatric:         Attention and Perception: Attention normal.         Mood and Affect: Mood normal.          Speech: Speech normal.         Recent Labs   Lab Test 05/11/23  0953 11/09/22  1437 04/12/22  1027   HGB  --   --  14.1    141 141   POTASSIUM 5.0 4.9 4.8   CR 1.34* 1.19* 1.44*   A1C 6.4* 6.3* 6.3*        Diagnostics:  No results found for this or any previous visit (from the past 240 hour(s)).   No EKG required for low risk surgery (cataract, skin procedure, breast biopsy, etc).    Revised Cardiac Risk Index (RCRI):  The patient has the following serious cardiovascular risks for perioperative complications:   - No serious cardiac risks = 0 points     RCRI Interpretation: 0 points: Class I (very low risk - 0.4% complication rate)    Oliveros score > 4 deborah    Signed Electronically by: Clemente Hogan PA-C  Copy of this evaluation report is provided to requesting physician.

## 2023-08-08 ENCOUNTER — ALLIED HEALTH/NURSE VISIT (OUTPATIENT)
Dept: FAMILY MEDICINE | Facility: CLINIC | Age: 68
End: 2023-08-08
Payer: MEDICARE

## 2023-08-08 DIAGNOSIS — Z01.818 PREOP GENERAL PHYSICAL EXAM: Primary | ICD-10-CM

## 2023-08-08 PROCEDURE — 99207 PR NO CHARGE LOS: CPT

## 2023-08-21 ENCOUNTER — TELEPHONE (OUTPATIENT)
Dept: FAMILY MEDICINE | Facility: CLINIC | Age: 68
End: 2023-08-21

## 2023-08-21 NOTE — TELEPHONE ENCOUNTER
Form prepped and up for NK to review, complete and sign.      Pepper Way MA on 8/21/2023 at 5:19 PM

## 2023-08-21 NOTE — TELEPHONE ENCOUNTER
Forms Request  Name of form/paperwork: [facility]  Have you been seen for this request:   Do we have the form: on laura's desk  When is form needed by: when done  How would you like the form returned: fax [phone]  Patient Notified form requests are processed in 3-5 business days: yes    Okay to leave a detailed message?

## 2023-08-25 ENCOUNTER — TRANSFERRED RECORDS (OUTPATIENT)
Dept: HEALTH INFORMATION MANAGEMENT | Facility: CLINIC | Age: 68
End: 2023-08-25
Payer: MEDICARE

## 2023-08-28 ENCOUNTER — TRANSFERRED RECORDS (OUTPATIENT)
Dept: HEALTH INFORMATION MANAGEMENT | Facility: CLINIC | Age: 68
End: 2023-08-28
Payer: MEDICARE

## 2023-09-17 PROBLEM — C43.9 MELANOMA OF SKIN (H): Status: ACTIVE | Noted: 2023-09-17

## 2023-10-23 ENCOUNTER — TRANSFERRED RECORDS (OUTPATIENT)
Dept: HEALTH INFORMATION MANAGEMENT | Facility: CLINIC | Age: 68
End: 2023-10-23
Payer: MEDICARE

## 2023-11-07 ENCOUNTER — TRANSFERRED RECORDS (OUTPATIENT)
Dept: MULTI SPECIALTY CLINIC | Facility: CLINIC | Age: 68
End: 2023-11-07
Payer: MEDICARE

## 2023-11-07 LAB — RETINOPATHY: NORMAL

## 2023-11-14 ENCOUNTER — OFFICE VISIT (OUTPATIENT)
Dept: FAMILY MEDICINE | Facility: CLINIC | Age: 68
End: 2023-11-14
Attending: NURSE PRACTITIONER
Payer: MEDICARE

## 2023-11-14 VITALS
WEIGHT: 200.8 LBS | SYSTOLIC BLOOD PRESSURE: 102 MMHG | RESPIRATION RATE: 16 BRPM | DIASTOLIC BLOOD PRESSURE: 70 MMHG | TEMPERATURE: 98.1 F | OXYGEN SATURATION: 98 % | BODY MASS INDEX: 30.43 KG/M2 | HEIGHT: 68 IN | HEART RATE: 47 BPM

## 2023-11-14 DIAGNOSIS — N18.31 TYPE 2 DIABETES MELLITUS WITH STAGE 3A CHRONIC KIDNEY DISEASE, WITHOUT LONG-TERM CURRENT USE OF INSULIN (H): ICD-10-CM

## 2023-11-14 DIAGNOSIS — N18.31 STAGE 3A CHRONIC KIDNEY DISEASE (H): Primary | ICD-10-CM

## 2023-11-14 DIAGNOSIS — E11.22 TYPE 2 DIABETES MELLITUS WITH STAGE 3A CHRONIC KIDNEY DISEASE, WITHOUT LONG-TERM CURRENT USE OF INSULIN (H): ICD-10-CM

## 2023-11-14 DIAGNOSIS — E78.00 PURE HYPERCHOLESTEROLEMIA: ICD-10-CM

## 2023-11-14 DIAGNOSIS — C43.59 MALIGNANT MELANOMA OF SKIN OF TRUNK (H): ICD-10-CM

## 2023-11-14 DIAGNOSIS — C61 MALIGNANT TUMOR OF PROSTATE (H): ICD-10-CM

## 2023-11-14 LAB
ALBUMIN SERPL BCG-MCNC: 4.5 G/DL (ref 3.5–5.2)
ALP SERPL-CCNC: 35 U/L (ref 40–150)
ALT SERPL W P-5'-P-CCNC: 29 U/L (ref 0–70)
ANION GAP SERPL CALCULATED.3IONS-SCNC: 11 MMOL/L (ref 7–15)
AST SERPL W P-5'-P-CCNC: 33 U/L (ref 0–45)
BILIRUB SERPL-MCNC: 0.4 MG/DL
BUN SERPL-MCNC: 20.1 MG/DL (ref 8–23)
CALCIUM SERPL-MCNC: 9.8 MG/DL (ref 8.8–10.2)
CHLORIDE SERPL-SCNC: 103 MMOL/L (ref 98–107)
CREAT SERPL-MCNC: 1.31 MG/DL (ref 0.67–1.17)
CREAT UR-MCNC: 101 MG/DL
DEPRECATED HCO3 PLAS-SCNC: 26 MMOL/L (ref 22–29)
EGFRCR SERPLBLD CKD-EPI 2021: 59 ML/MIN/1.73M2
GLUCOSE SERPL-MCNC: 135 MG/DL (ref 70–99)
HBA1C MFR BLD: 6.4 % (ref 0–5.6)
MICROALBUMIN UR-MCNC: 16.9 MG/L
MICROALBUMIN/CREAT UR: 16.73 MG/G CR (ref 0–17)
POTASSIUM SERPL-SCNC: 5 MMOL/L (ref 3.4–5.3)
PROT SERPL-MCNC: 7.3 G/DL (ref 6.4–8.3)
SODIUM SERPL-SCNC: 140 MMOL/L (ref 135–145)

## 2023-11-14 PROCEDURE — 83036 HEMOGLOBIN GLYCOSYLATED A1C: CPT | Performed by: NURSE PRACTITIONER

## 2023-11-14 PROCEDURE — 84153 ASSAY OF PSA TOTAL: CPT | Performed by: NURSE PRACTITIONER

## 2023-11-14 PROCEDURE — 36415 COLL VENOUS BLD VENIPUNCTURE: CPT | Performed by: NURSE PRACTITIONER

## 2023-11-14 PROCEDURE — 99214 OFFICE O/P EST MOD 30 MIN: CPT | Performed by: NURSE PRACTITIONER

## 2023-11-14 PROCEDURE — 80053 COMPREHEN METABOLIC PANEL: CPT | Performed by: NURSE PRACTITIONER

## 2023-11-14 PROCEDURE — 82570 ASSAY OF URINE CREATININE: CPT | Performed by: NURSE PRACTITIONER

## 2023-11-14 PROCEDURE — 82043 UR ALBUMIN QUANTITATIVE: CPT | Performed by: NURSE PRACTITIONER

## 2023-11-14 RX ORDER — RESPIRATORY SYNCYTIAL VIRUS VACCINE 120MCG/0.5
0.5 KIT INTRAMUSCULAR ONCE
Qty: 1 EACH | Refills: 0 | Status: CANCELLED | OUTPATIENT
Start: 2023-11-14 | End: 2023-11-14

## 2023-11-14 RX ORDER — GLIPIZIDE 5 MG/1
5 TABLET, FILM COATED, EXTENDED RELEASE ORAL DAILY
Qty: 90 TABLET | Refills: 1 | Status: SHIPPED | OUTPATIENT
Start: 2023-11-14 | End: 2024-06-25

## 2023-11-14 RX ORDER — FENOFIBRATE 160 MG/1
160 TABLET ORAL DAILY
Qty: 90 TABLET | Refills: 3 | Status: SHIPPED | OUTPATIENT
Start: 2023-11-14 | End: 2024-06-25

## 2023-11-14 ASSESSMENT — PAIN SCALES - GENERAL: PAINLEVEL: NO PAIN (0)

## 2023-11-14 NOTE — PATIENT INSTRUCTIONS
Updating labs including the PSA     If interested in the RSV vaccine, it is covered at the pharmacies under Medicare part D    If getting lightheaded/woozy spells, go ahead and cut your lisinopril hydrochlorothiazide in half    I will keep my eyes out for that eye exam report

## 2023-11-14 NOTE — PROGRESS NOTES
Assessment & Plan     ICD-10-CM    1. Stage 3a chronic kidney disease (H)  N18.31 Albumin Random Urine Quantitative with Creat Ratio     Comprehensive metabolic panel (BMP + Alb, Alk Phos, ALT, AST, Total. Bili, TP)     Albumin Random Urine Quantitative with Creat Ratio     Comprehensive metabolic panel (BMP + Alb, Alk Phos, ALT, AST, Total. Bili, TP)      2. Type 2 diabetes mellitus with stage 3a chronic kidney disease, without long-term current use of insulin (H)  E11.22 HEMOGLOBIN A1C    N18.31 glipiZIDE (GLUCOTROL XL) 5 MG 24 hr tablet     metFORMIN (GLUCOPHAGE) 1000 MG tablet     Comprehensive metabolic panel (BMP + Alb, Alk Phos, ALT, AST, Total. Bili, TP)     HEMOGLOBIN A1C     Comprehensive metabolic panel (BMP + Alb, Alk Phos, ALT, AST, Total. Bili, TP)      3. Malignant melanoma of skin of trunk (H)  C43.59       4. Pure hypercholesterolemia  E78.00 fenofibrate (TRIGLIDE/LOFIBRA) 160 MG tablet      5. Malignant tumor of prostate (H)  C61 PSA, tumor marker     PSA, tumor marker        Doing well.  Update diabetic labs.  Keep an eye on PSA.  Consider dropping down to yearly PSAs if undetectable.  Continue working with dermatology/oncology for melanoma.  Reviewed RSV vaccine coverage.  Blood pressure low/normal.  Asymptomatic.  Continue to monitor.  If getting dizziness spells consider cutting lisinopril hydrochlorothiazide in half or propranolol although he does get some benefit of tremors with that.  COVID shot declined.    Subjective     HPI     Diabetes:   He presents for follow up of diabetes.    He is not checking blood glucose.         He has no concerns regarding his diabetes at this time.   He is not experiencing numbness or burning in feet, excessive thirst, blurry vision, weight changes or redness, sores or blisters on feet. The patient has had a diabetic eye exam in the last 12 months. Eye exam performed on 11/07/23. Location of last eye exam [facility].    Recently diagnosed with  "melanoma.  Surgically excised.  No systemic therapy needed right now. Will be having serial scans and skin checks.      Lab Results   Component Value Date    A1C 6.4 05/11/2023    A1C 6.3 11/09/2022    A1C 6.3 04/12/2022    A1C 5.9 11/19/2021    A1C 8.0 07/20/2021     BP looks good. No dizzy spells.     Review of Systems - negative except for what's listed in the HPI      Objective    /70 (BP Location: Right arm, Patient Position: Sitting, Cuff Size: Adult Regular)   Pulse (!) 47   Temp 98.1  F (36.7  C) (Oral)   Resp 16   Ht 1.727 m (5' 8\")   Wt 91.1 kg (200 lb 12.8 oz)   SpO2 98%   BMI 30.53 kg/m    Physical Exam   General appearance - alert, well appearing, and in no distress  Mental status - alert, oriented to person, place, and time  Eyes -PERRLA  Ears - bilateral TM's and external ear canals normal  Nose - normal and patent, no erythema, discharge or polyps  Mouth - mucous membranes moist. No oral lesions.  Neck - no significant adenopathy  Lymphatics - no palpable lymphadenopathy  Chest - clear to auscultation, no wheezes, rales or rhonchi, symmetric air entry  Heart - normal rate and regular rhythm, S1 and S2 normal, no murmurs noted  Abdomen - soft, nontender, nondistended, no masses or organomegaly  Neurological - alert, oriented, normal speech, no focal findings or movement disorder noted, neck supple without rigidity, cranial nerves II through XII intact, motor and sensory grossly normal bilaterally, normal muscle tone, no tremors, strength 5/5  Extremities - peripheral pulses normal, no peripheral edema  Skin - normal coloration and turgor.    Van Schulz, CNP    This note has been dictated using voice recognition software. Any grammatical or context distortions are unintentional and inherent to the software.            "

## 2023-11-15 LAB — PSA SERPL DL<=0.01 NG/ML-MCNC: <0.01 NG/ML (ref 0–4.5)

## 2023-12-01 ENCOUNTER — PATIENT OUTREACH (OUTPATIENT)
Dept: GASTROENTEROLOGY | Facility: CLINIC | Age: 68
End: 2023-12-01
Payer: MEDICARE

## 2023-12-29 ENCOUNTER — OFFICE VISIT (OUTPATIENT)
Dept: FAMILY MEDICINE | Facility: CLINIC | Age: 68
End: 2023-12-29
Payer: MEDICARE

## 2023-12-29 VITALS
DIASTOLIC BLOOD PRESSURE: 78 MMHG | SYSTOLIC BLOOD PRESSURE: 122 MMHG | RESPIRATION RATE: 16 BRPM | BODY MASS INDEX: 31.81 KG/M2 | HEIGHT: 67 IN | HEART RATE: 48 BPM | TEMPERATURE: 97.9 F | WEIGHT: 202.7 LBS | OXYGEN SATURATION: 97 %

## 2023-12-29 DIAGNOSIS — C61 MALIGNANT TUMOR OF PROSTATE (H): ICD-10-CM

## 2023-12-29 DIAGNOSIS — E11.22 TYPE 2 DIABETES MELLITUS WITH STAGE 3A CHRONIC KIDNEY DISEASE, WITHOUT LONG-TERM CURRENT USE OF INSULIN (H): ICD-10-CM

## 2023-12-29 DIAGNOSIS — Z01.818 PREOP GENERAL PHYSICAL EXAM: Primary | ICD-10-CM

## 2023-12-29 DIAGNOSIS — N18.31 STAGE 3A CHRONIC KIDNEY DISEASE (H): ICD-10-CM

## 2023-12-29 DIAGNOSIS — Z86.0100 HISTORY OF COLONIC POLYPS: ICD-10-CM

## 2023-12-29 DIAGNOSIS — K21.00 GASTROESOPHAGEAL REFLUX DISEASE WITH ESOPHAGITIS WITHOUT HEMORRHAGE: ICD-10-CM

## 2023-12-29 DIAGNOSIS — E66.811 CLASS 1 OBESITY DUE TO EXCESS CALORIES WITH SERIOUS COMORBIDITY AND BODY MASS INDEX (BMI) OF 31.0 TO 31.9 IN ADULT: ICD-10-CM

## 2023-12-29 DIAGNOSIS — E66.09 CLASS 1 OBESITY DUE TO EXCESS CALORIES WITH SERIOUS COMORBIDITY AND BODY MASS INDEX (BMI) OF 31.0 TO 31.9 IN ADULT: ICD-10-CM

## 2023-12-29 DIAGNOSIS — N18.31 TYPE 2 DIABETES MELLITUS WITH STAGE 3A CHRONIC KIDNEY DISEASE, WITHOUT LONG-TERM CURRENT USE OF INSULIN (H): ICD-10-CM

## 2023-12-29 DIAGNOSIS — I10 BENIGN ESSENTIAL HYPERTENSION: ICD-10-CM

## 2023-12-29 DIAGNOSIS — C43.59 MALIGNANT MELANOMA OF SKIN OF TRUNK (H): ICD-10-CM

## 2023-12-29 PROCEDURE — 99215 OFFICE O/P EST HI 40 MIN: CPT | Performed by: NURSE PRACTITIONER

## 2023-12-29 RX ORDER — LATANOPROST 50 UG/ML
SOLUTION/ DROPS OPHTHALMIC
COMMUNITY
Start: 2023-11-08

## 2023-12-29 RX ORDER — OMEPRAZOLE 40 MG/1
40 CAPSULE, DELAYED RELEASE ORAL DAILY
Qty: 90 CAPSULE | Refills: 0 | Status: SHIPPED | OUTPATIENT
Start: 2023-12-29 | End: 2024-04-08

## 2023-12-29 ASSESSMENT — PAIN SCALES - GENERAL: PAINLEVEL: NO PAIN (0)

## 2023-12-29 NOTE — PROGRESS NOTES
[facility]  [address]  [place] [facility] [address]  Phone: [phone]  Fax: [phone]  Primary Provider: Van Schulz  Pre-op Performing Provider: VAN SCHULZ    PREOPERATIVE EVALUATION:  Today's date: 12/29/2023    Blu is a 68 year old, presenting for the following:  Pre-Op Exam        12/29/2023     8:34 AM   Additional Questions   Roomed by Marcy Gutiérrez CMA       Surgical Information:  Surgery/Procedure: COLONOSCOPY WITH ENDOSCOPIC MUCOSAL RESECTION   Surgery Location: [facility]   Surgeon: Moises Trevizo  Surgery Date: 01/09/2023  Time of Surgery: 9:55 am  Where patient plans to recover: At home with family  Fax number for surgical facility: [phone] & [phone]    Assessment & Plan     The proposed surgical procedure is considered LOW risk.    Preop general physical exam  Medically optimized for procedure    History of colonic polyps  Plan colonoscopy    Stage 3a chronic kidney disease (H)  Stable    Gastroesophageal reflux disease with esophagitis without hemorrhage  Uncontrolled symptoms.  Given stomach contents coming into the mouth, low suspicion for cardiac origin.  Tums also seems to help.  Increase omeprazole to 40 mg.  Let me know in a month how things are going.  If persisting consider pantoprazole or adding in famotidine.    - omeprazole (PRILOSEC) 40 MG DR capsule; Take 1 capsule (40 mg) by mouth daily    Malignant tumor of prostate (H)  Status post treatment.  On yearly PSA surveillance    Malignant melanoma of skin of trunk (H)  That is post excision.  On surveillance through dermatology    Benign essential hypertension  Well-controlled    Type 2 diabetes mellitus with stage 3a chronic kidney disease, without long-term current use of insulin (H)  Controlled    Class 1 obesity due to excess calories with serious comorbidity and body mass index (BMI) of 31.0 to  31.9 in adult            - No identified additional risk factors other than previously addressed    Typically takes most medications at night.  Hold morning medicines including diabetic meds    RECOMMENDATION:  APPROVAL GIVEN to proceed with proposed procedure, without further diagnostic evaluation.    Reviewed family medicine note x 1, ECG x 1, metabolic panel x 1, CBC x 1    Total time spent was 42 minutes including reviewing records prior to arrival, consultation, placing orders, education, and reviewing the plan of care on the date of service.    Subjective       HPI related to upcoming procedure: History of known colon polyp requiring additional tools only available at the hospital.  Will be having repeat colonoscopy in January.    Getting breakthrough reflux. Worse at night.  Tums helps.  Getting stomach contents into the mouth at times.  Continues with omeprazole 20 mg.          12/26/2023    11:36 AM   Preop Questions   1. Have you ever had a heart attack or stroke? No   2. Have you ever had surgery on your heart or blood vessels, such as a stent placement, a coronary artery bypass, or surgery on an artery in your head, neck, heart, or legs? No   3. Do you have chest pain with activity? No   4. Do you have a history of  heart failure? No   5. Do you currently have a cold, bronchitis or symptoms of other infection? No   6. Do you have a cough, shortness of breath, or wheezing? No   7. Do you or anyone in your family have previous history of blood clots? No   8. Do you or does anyone in your family have a serious bleeding problem such as prolonged bleeding following surgeries or cuts? No   9. Have you ever had problems with anemia or been told to take iron pills? No   10. Have you had any abnormal blood loss such as black, tarry or bloody stools? No   11. Have you ever had a blood transfusion? No   12. Are you willing to have a blood transfusion if it is medically needed before, during, or after your surgery?  Yes   13. Have you or any of your relatives ever had problems with anesthesia? No   14. Do you have sleep apnea, excessive snoring or daytime drowsiness? No   15. Do you have any artifical heart valves or other implanted medical devices like a pacemaker, defibrillator, or continuous glucose monitor? No   16. Do you have artificial joints? No   17. Are you allergic to latex? No       Health Care Directive:  Patient does not have a Health Care Directive or Living Will: previously reviewed    Preoperative Review of :  Nothing current    Status of Chronic Conditions:  See problem list for active medical problems.  Problems all longstanding and stable, except as noted/documented.  See ROS for pertinent symptoms related to these conditions.    Review of Systems  CONSTITUTIONAL: NEGATIVE for fever, chills, change in weight  INTEGUMENTARY/SKIN: NEGATIVE for worrisome rashes, moles or lesions  EYES: NEGATIVE for vision changes or irritation  ENT/MOUTH: NEGATIVE for ear, mouth and throat problems  RESP: NEGATIVE for significant cough or SOB  CV: NEGATIVE for chest pain, palpitations or peripheral edema  GI: NEGATIVE for nausea, abdominal pain, heartburn, or change in bowel habits  : NEGATIVE for frequency, dysuria, or hematuria  MUSCULOSKELETAL: NEGATIVE for significant arthralgias or myalgia  NEURO: NEGATIVE for weakness, dizziness or paresthesias  ENDOCRINE: NEGATIVE for temperature intolerance, skin/hair changes  HEME: NEGATIVE for bleeding problems  PSYCHIATRIC: NEGATIVE for changes in mood or affect    Patient Active Problem List    Diagnosis Date Noted    Malignant melanoma of skin of trunk (H) 09/17/2023     Priority: Medium    Sebaceous cyst 05/09/2022     Priority: Medium     Added automatically from request for surgery [id number]      Chronic kidney disease, stage 3 (H) 07/20/2021     Priority: Medium    Class 1 obesity due to excess calories with serious comorbidity and body mass index (BMI) of 31.0 to 31.9 in  adult 07/20/2021     Priority: Medium    Stress incontinence 12/13/2018     Priority: Medium    Malignant tumor of prostate (H) 07/27/2018     Priority: Medium    ED (erectile dysfunction) 04/17/2018     Priority: Medium    Type 2 diabetes mellitus (H)      Priority: Medium     Created by Conversion      Formatting of this note might be different from the original.  Created by Conversion      Basal cell carcinoma      Priority: Medium    Back skin lesion 04/06/2016     Priority: Medium    Primary hypercholesterolemia      Priority: Medium     Created by Conversion      Formatting of this note might be different from the original.  Created by Conversion      Benign essential hypertension      Priority: Medium     Created by Conversion      Formatting of this note might be different from the original.  Created by Conversion        Past Medical History:   Diagnosis Date    Cancer (H) 04/01/2017    BCC on back    Diabetes mellitus (H)     GERD (gastroesophageal reflux disease)     High cholesterol     Hypertension      Past Surgical History:   Procedure Laterality Date    COLONOSCOPY  12/2021    EXCISE GANGLION WRIST      EXCISE MASS TRUNK N/A 06/07/2022    Procedure: EXCISION, MASS, TORSO- back;  Surgeon: Rj Bañuelos DO;  Location: [facility] OR    OTHER SURGICAL HISTORY      vastectomy    PILONIDAL CYST / SINUS EXCISION      NJ LAP,PROSTATECTOMY,RADICAL,W/NERVE SPARE,INCL ROBOTIC Bilateral 08/28/2018    Procedure: ROBOTIC ASSISTED RADICAL RETROPUBIC PROSTATECTOMY BILATERAL PELVIC LYMPH NODE DISSECTION LYSIS OF ADHESIONS;  Surgeon: Abhishek Phillips MD;  Location: [facility];  Service: Urology    RELEASE TRIGGER FINGER       Current Outpatient Medications   Medication Sig Dispense Refill    aspirin 81 MG EC tablet [ASPIRIN 81 MG EC TABLET] Take 81 mg by mouth every evening.       fenofibrate (TRIGLIDE/LOFIBRA) 160 MG tablet Take 1 tablet (160 mg) by mouth daily 90 tablet 3    glipiZIDE (GLUCOTROL  "XL) 5 MG 24 hr tablet Take 1 tablet (5 mg) by mouth daily 90 tablet 1    latanoprost (XALATAN) 0.005 % ophthalmic solution       lisinopril-hydrochlorothiazide (ZESTORETIC) 10-12.5 MG tablet Take 1 tablet by mouth once daily 90 tablet 3    metFORMIN (GLUCOPHAGE) 1000 MG tablet Take 1 tablet (1,000 mg) by mouth 2 times daily (with meals) 180 tablet 1    Multiple Vitamins-Minerals (MULTIVITAL PO)       omeprazole (PRILOSEC) 20 MG DR capsule Take 1 capsule (20 mg) by mouth daily 90 capsule 3    propranolol ER (INDERAL LA) 60 MG 24 hr capsule Take 1 capsule (60 mg) by mouth daily 90 capsule 3    simvastatin (ZOCOR) 80 MG tablet TAKE ONE TABLET BY MOUTH AT BEDTIME 90 tablet 3       No Known Allergies     Social History     Tobacco Use    Smoking status: Never     Passive exposure: Never    Smokeless tobacco: Never   Substance Use Topics    Alcohol use: Yes     Alcohol/week: 3.0 standard drinks of alcohol     Types: 3 Standard drinks or equivalent per week     Comment: 2 drinks/ month     Family History   Problem Relation Age of Onset    Lung Cancer Father     Diabetes Mother     Hypertension Mother     Hyperlipidemia Mother     No Known Problems Sister     No Known Problems Sister     No Known Problems Sister     Stomach Cancer Brother     No Known Problems Brother     No Known Problems Brother     No Known Problems Brother      History   Drug Use No         Objective     /78 (BP Location: Right arm, Patient Position: Sitting, Cuff Size: Adult Regular)   Pulse (!) 48   Temp 97.9  F (36.6  C) (Oral)   Resp 16   Ht 1.702 m (5' 7\")   Wt 91.9 kg (202 lb 11.2 oz)   SpO2 97%   BMI 31.75 kg/m      Physical Exam    GENERAL APPEARANCE: healthy, alert and no distress     EYES: EOMI,  PERRL     HENT: ear canals and TM's normal and nose and mouth without ulcers or lesions     NECK: no adenopathy, no asymmetry, masses, or scars and thyroid normal to palpation     RESP: lungs clear to auscultation - no rales, rhonchi or " wheezes     CV: regular rates and rhythm, normal S1 S2, no S3 or S4 and no murmur, click or rub     ABDOMEN:  soft, nontender, no HSM or masses and bowel sounds normal     MS: extremities normal- no gross deformities noted, no evidence of inflammation in joints, FROM in all extremities.     SKIN: no suspicious lesions or rashes     NEURO: Normal strength and tone, sensory exam grossly normal, mentation intact and speech normal     PSYCH: mentation appears normal. and affect normal/bright     LYMPHATICS: No cervical adenopathy    Recent Labs   Lab Test 11/14/23  1033 08/07/23  1431 05/11/23  0953 11/09/22  1437 04/12/22  1027   HGB  --  13.1*  --   --  14.1   PLT  --  270  --   --   --     140 142   < > 141   POTASSIUM 5.0 4.6 5.0   < > 4.8   CR 1.31* 1.26* 1.34*   < > 1.44*   A1C 6.4*  --  6.4*   < > 6.3*    < > = values in this interval not displayed.        Diagnostics:  No labs were ordered during this visit.   ECG results from 08/07/23   EKG 12-lead, tracing only     Value    Systolic Blood Pressure     Diastolic Blood Pressure     Ventricular Rate 61    Atrial Rate 61    ID Interval 152    QRS Duration 94        QTc 410    P Axis 58    R AXIS -21    T Axis 6    Interpretation ECG      Sinus rhythm  Minimal voltage criteria for LVH, may be normal variant  Borderline ECG  No previous ECGs available           Revised Cardiac Risk Index (RCRI):  The patient has the following serious cardiovascular risks for perioperative complications:   - No serious cardiac risks = 0 points     RCRI Interpretation: 0 points: Class I (very low risk - 0.4% complication rate)         Signed Electronically by: Van Schulz NP  Copy of this evaluation report is provided to requesting physician.

## 2023-12-29 NOTE — PATIENT INSTRUCTIONS
Let's increase the omeprazole to the 40 mg dosage.  You can take 2 capsules at the same time to use up your current prescription.    If still having reflux issues after a month, let me know and we can figure out plan B.    I will get your paperwork faxed to your surgeon.    Follow your surgeon's directions regarding eating and drinking prior to surgery.     Medications you can take the morning of surgery include any inhalers and: omeprazole    Your surgeon will manage any complications after your procedure.

## 2024-01-04 LAB
ATRIAL RATE - MUSE: 61 BPM
DIASTOLIC BLOOD PRESSURE - MUSE: NORMAL MMHG
INTERPRETATION ECG - MUSE: NORMAL
P AXIS - MUSE: 58 DEGREES
PR INTERVAL - MUSE: 152 MS
QRS DURATION - MUSE: 94 MS
QT - MUSE: 408 MS
QTC - MUSE: 410 MS
R AXIS - MUSE: -21 DEGREES
SYSTOLIC BLOOD PRESSURE - MUSE: NORMAL MMHG
T AXIS - MUSE: 6 DEGREES
VENTRICULAR RATE- MUSE: 61 BPM

## 2024-01-24 ENCOUNTER — TRANSFERRED RECORDS (OUTPATIENT)
Dept: HEALTH INFORMATION MANAGEMENT | Facility: CLINIC | Age: 69
End: 2024-01-24
Payer: MEDICARE

## 2024-03-28 ENCOUNTER — TRANSFERRED RECORDS (OUTPATIENT)
Dept: HEALTH INFORMATION MANAGEMENT | Facility: CLINIC | Age: 69
End: 2024-03-28
Payer: MEDICARE

## 2024-04-08 DIAGNOSIS — K21.00 GASTROESOPHAGEAL REFLUX DISEASE WITH ESOPHAGITIS WITHOUT HEMORRHAGE: ICD-10-CM

## 2024-04-08 RX ORDER — OMEPRAZOLE 40 MG/1
40 CAPSULE, DELAYED RELEASE ORAL DAILY
Qty: 90 CAPSULE | Refills: 1 | Status: SHIPPED | OUTPATIENT
Start: 2024-04-08 | End: 2024-09-27

## 2024-04-24 ENCOUNTER — TRANSFERRED RECORDS (OUTPATIENT)
Dept: HEALTH INFORMATION MANAGEMENT | Facility: CLINIC | Age: 69
End: 2024-04-24
Payer: MEDICARE

## 2024-05-11 ENCOUNTER — HEALTH MAINTENANCE LETTER (OUTPATIENT)
Age: 69
End: 2024-05-11

## 2024-05-14 ENCOUNTER — NURSE TRIAGE (OUTPATIENT)
Dept: NURSING | Facility: CLINIC | Age: 69
End: 2024-05-14
Payer: MEDICARE

## 2024-05-14 NOTE — TELEPHONE ENCOUNTER
Cold sx since Friday. Afebrile. Cough, mild nasal congestion. No SOB. Clear sputum. Sleeping well at night.     Protocol reviewed, DISPOSITION: Home care advice given. Call back with worsening symptoms, further questions/concerns.     MATILDA MALIK RN  [facility] nurse advisors  5/14/2024  8:23 AM  Reason for Disposition   Cough with no complications    Additional Information   Negative: Bluish (or gray) lips or face   Negative: SEVERE difficulty breathing (e.g., struggling for each breath, speaks in single words)   Negative: Rapid onset of cough and has hives   Negative: Coughing started suddenly after medicine, an allergic food or bee sting   Negative: Difficulty breathing after exposure to flames, smoke, or fumes   Negative: Sounds like a life-threatening emergency to the triager   Negative: Previous asthma attacks and this feels like asthma attack   Negative: Dry cough (non-productive; no sputum or minimal clear sputum) and within 14 days of COVID-19 Exposure   Negative: MODERATE difficulty breathing (e.g., speaks in phrases, SOB even at rest, pulse 100-120) and still present when not coughing   Negative: Chest pain present when not coughing   Negative: Passed out (i.e., fainted, collapsed and was not responding)   Negative: Patient sounds very sick or weak to the triager   Negative: MILD difficulty breathing (e.g., minimal/no SOB at rest, SOB with walking, pulse <100) and still present when not coughing   Negative: Coughed up > 1 tablespoon (15 ml) blood (Exception: Blood-tinged sputum.)   Negative: Fever > 103 F (39.4 C)   Negative: Fever > 101 F (38.3 C) and over 60 years of age   Negative: Fever > 100.0 F (37.8 C) and has diabetes mellitus or a weak immune system (e.g., HIV positive, cancer chemotherapy, organ transplant, splenectomy, chronic steroids)   Negative: Fever > 100.0 F (37.8 C) and bedridden (e.g., CVA, chronic illness, recovering from surgery)   Negative: Increasing ankle swelling    Negative: Wheezing is present   Negative: SEVERE coughing spells (e.g., whooping sound after coughing, vomiting after coughing)   Negative: Coughing up kandy-colored (reddish-brown) or blood-tinged sputum   Negative: Fever present > 3 days (72 hours)   Negative: Fever returns after gone for over 24 hours and symptoms worse or not improved   Negative: Using nasal washes and pain medicine > 24 hours and sinus pain persists   Negative: Known COPD or other severe lung disease (i.e., bronchiectasis, cystic fibrosis, lung surgery) and worsening symptoms (i.e., increased sputum purulence or amount, increased breathing difficulty)   Negative: Continuous (nonstop) coughing interferes with work or school and no improvement using cough treatment per Care Advice   Negative: Patient wants to be seen   Negative: Cough has been present for > 3 weeks   Negative: Allergy symptoms are also present (e.g., itchy eyes, clear nasal discharge, postnasal drip)   Negative: Nasal discharge present > 10 days   Negative: Exposure to TB (Tuberculosis)   Negative: Taking an ACE Inhibitor medicine (e.g., benazepril/LOTENSIN, captopril/CAPOTEN, enalapril/VASOTEC, lisinopril/ZESTRIL)    Protocols used: Cough-A-OH

## 2024-05-17 ENCOUNTER — NURSE TRIAGE (OUTPATIENT)
Dept: FAMILY MEDICINE | Facility: CLINIC | Age: 69
End: 2024-05-17
Payer: MEDICARE

## 2024-05-17 NOTE — TELEPHONE ENCOUNTER
Nurse Triage SBAR    Is this a 2nd Level Triage? NO    Situation: Cough/congestion for one week.      Background: History of obesity, type 2 DM, hypertension, CKD stage 3.    Assessment: Patient was triaged on 5/14 (3 days ago) and was given the disposition of home care.  Today called because did a pulse ox and it measured 88-90%.  Did sound slightly short of breath when speaking on the phone.  Rechecking oxygen at it was measuring 94%.  Patient did not cough during conversation. States that cough is thick and is not in sinus/head.  Is able to bring up phlegm that is white and clear.  Denies blood tinged phlegm.  Denies fever.  Is drinking a lot of water. Has been taking Dayquil and Nyquil.  Reports that Dayquil does not help too much.    Protocol Recommended Disposition:   Home Care    Recommendation: Advised to use humidifier and try a different OTC medication.  Advised to call or go in to urgent care this weekend if develops a fever, if cough worsens or becomes phlegm changes. Patient verbalized understanding.     GEORGE Kim RN  [facility]           Does the patient meet one of the following criteria for ADS visit consideration? 16+ years old, with an [facility] PCP     TIP  Providers, please consider if this condition is appropriate for management at one of our Acute and Diagnostic Services sites.     If patient is a good candidate, please use dotphrase <dot>triageresponse and select Refer to ADS to document.  Reason for Disposition   Cough with cold symptoms (e.g., runny nose, postnasal drip, throat clearing)    Additional Information   Negative: Bluish (or gray) lips or face   Negative: SEVERE difficulty breathing (e.g., struggling for each breath, speaks in single words)   Negative: Rapid onset of cough and has hives   Negative: Coughing started suddenly after medicine, an allergic food or bee sting   Negative: Difficulty breathing after exposure to flames, smoke, or fumes    Negative: Sounds like a life-threatening emergency to the triager   Negative: Previous asthma attacks and this feels like asthma attack   Negative: Dry cough (non-productive; no sputum or minimal clear sputum) and within 14 days of COVID-19 Exposure   Negative: MODERATE difficulty breathing (e.g., speaks in phrases, SOB even at rest, pulse 100-120) and still present when not coughing   Negative: Chest pain present when not coughing   Negative: Passed out (i.e., fainted, collapsed and was not responding)   Negative: Patient sounds very sick or weak to the triager   Negative: MILD difficulty breathing (e.g., minimal/no SOB at rest, SOB with walking, pulse <100) and still present when not coughing   Negative: Coughed up > 1 tablespoon (15 ml) blood (Exception: Blood-tinged sputum.)   Negative: Fever > 103 F (39.4 C)   Negative: Fever > 101 F (38.3 C) and over 60 years of age   Negative: Fever > 100.0 F (37.8 C) and has diabetes mellitus or a weak immune system (e.g., HIV positive, cancer chemotherapy, organ transplant, splenectomy, chronic steroids)   Negative: Fever > 100.0 F (37.8 C) and bedridden (e.g., CVA, chronic illness, recovering from surgery)   Negative: Increasing ankle swelling   Negative: Wheezing is present   Negative: SEVERE coughing spells (e.g., whooping sound after coughing, vomiting after coughing)   Negative: Coughing up kandy-colored (reddish-brown) or blood-tinged sputum   Negative: Fever present > 3 days (72 hours)   Negative: Fever returns after gone for over 24 hours and symptoms worse or not improved   Negative: Using nasal washes and pain medicine > 24 hours and sinus pain persists   Negative: Known COPD or other severe lung disease (i.e., bronchiectasis, cystic fibrosis, lung surgery) and worsening symptoms (i.e., increased sputum purulence or amount, increased breathing difficulty)   Negative: Continuous (nonstop) coughing interferes with work or school and no improvement using cough  "treatment per Care Advice   Negative: Patient wants to be seen   Negative: Cough has been present for > 3 weeks   Negative: Allergy symptoms are also present (e.g., itchy eyes, clear nasal discharge, postnasal drip)   Negative: Nasal discharge present > 10 days   Negative: Exposure to TB (Tuberculosis)   Negative: Taking an ACE Inhibitor medicine (e.g., benazepril/LOTENSIN, captopril/CAPOTEN, enalapril/VASOTEC, lisinopril/ZESTRIL)   Negative: Cough with no complications    Answer Assessment - Initial Assessment Questions  1. ONSET: \"When did the cough begin?\"       Last Friday - one week  2. SEVERITY: \"How bad is the cough today?\"       Getting bad again  3. SPUTUM: \"Describe the color of your sputum\" (none, dry cough; clear, white, yellow, green)      White  4. HEMOPTYSIS: \"Are you coughing up any blood?\" If so ask: \"How much?\" (flecks, streaks, tablespoons, etc.)      no  5. DIFFICULTY BREATHING: \"Are you having difficulty breathing?\" If Yes, ask: \"How bad is it?\" (e.g., mild, moderate, severe)     - MILD: No SOB at rest, mild SOB with walking, speaks normally in sentences, can lie down, no retractions, pulse < 100.     - MODERATE: SOB at rest, SOB with minimal exertion and prefers to sit, cannot lie down flat, speaks in phrases, mild retractions, audible wheezing, pulse 100-120.     - SEVERE: Very SOB at rest, speaks in single words, struggling to breathe, sitting hunched forward, retractions, pulse > 120       slight  6. FEVER: \"Do you have a fever?\" If Yes, ask: \"What is your temperature, how was it measured, and when did it start?\"      no  7. CARDIAC HISTORY: \"Do you have any history of heart disease?\" (e.g., heart attack, congestive heart failure)       no  8. LUNG HISTORY: \"Do you have any history of lung disease?\"  (e.g., pulmonary embolus, asthma, emphysema)      no  9. PE RISK FACTORS: \"Do you have a history of blood clots?\" (or: recent major surgery, recent prolonged travel, bedridden)      no  10. " "OTHER SYMPTOMS: \"Do you have any other symptoms?\" (e.g., runny nose, wheezing, chest pain)        Wheezing - no, chest pain - no, blowing nose a lot  11. PREGNANCY: \"Is there any chance you are pregnant?\" \"When was your last menstrual period?\"        no  12. TRAVEL: \"Have you traveled out of the country in the last month?\" (e.g., travel history, exposures)        no    Protocols used: Cough-A-OH    "

## 2024-06-04 DIAGNOSIS — I10 ESSENTIAL HYPERTENSION, BENIGN: ICD-10-CM

## 2024-06-06 RX ORDER — LISINOPRIL/HYDROCHLOROTHIAZIDE 10-12.5 MG
TABLET ORAL
Qty: 90 TABLET | Refills: 1 | Status: SHIPPED | OUTPATIENT
Start: 2024-06-06

## 2024-06-24 SDOH — HEALTH STABILITY: PHYSICAL HEALTH: ON AVERAGE, HOW MANY MINUTES DO YOU ENGAGE IN EXERCISE AT THIS LEVEL?: 60 MIN

## 2024-06-24 SDOH — HEALTH STABILITY: PHYSICAL HEALTH: ON AVERAGE, HOW MANY DAYS PER WEEK DO YOU ENGAGE IN MODERATE TO STRENUOUS EXERCISE (LIKE A BRISK WALK)?: 3 DAYS

## 2024-06-24 ASSESSMENT — SOCIAL DETERMINANTS OF HEALTH (SDOH): HOW OFTEN DO YOU GET TOGETHER WITH FRIENDS OR RELATIVES?: ONCE A WEEK

## 2024-06-25 ENCOUNTER — OFFICE VISIT (OUTPATIENT)
Dept: FAMILY MEDICINE | Facility: CLINIC | Age: 69
End: 2024-06-25
Attending: NURSE PRACTITIONER
Payer: MEDICARE

## 2024-06-25 VITALS
HEART RATE: 51 BPM | RESPIRATION RATE: 18 BRPM | DIASTOLIC BLOOD PRESSURE: 70 MMHG | BODY MASS INDEX: 30.61 KG/M2 | SYSTOLIC BLOOD PRESSURE: 124 MMHG | TEMPERATURE: 98.1 F | HEIGHT: 67 IN | WEIGHT: 195 LBS | OXYGEN SATURATION: 97 %

## 2024-06-25 DIAGNOSIS — N18.31 STAGE 3A CHRONIC KIDNEY DISEASE (H): ICD-10-CM

## 2024-06-25 DIAGNOSIS — Z00.00 ENCOUNTER FOR MEDICARE ANNUAL WELLNESS EXAM: Primary | ICD-10-CM

## 2024-06-25 DIAGNOSIS — C61 MALIGNANT TUMOR OF PROSTATE (H): ICD-10-CM

## 2024-06-25 DIAGNOSIS — E78.00 PURE HYPERCHOLESTEROLEMIA: ICD-10-CM

## 2024-06-25 DIAGNOSIS — I10 BENIGN ESSENTIAL HYPERTENSION: ICD-10-CM

## 2024-06-25 DIAGNOSIS — G25.0 ESSENTIAL TREMOR: ICD-10-CM

## 2024-06-25 DIAGNOSIS — N18.31 TYPE 2 DIABETES MELLITUS WITH STAGE 3A CHRONIC KIDNEY DISEASE, WITHOUT LONG-TERM CURRENT USE OF INSULIN (H): ICD-10-CM

## 2024-06-25 DIAGNOSIS — C43.59 MALIGNANT MELANOMA OF SKIN OF TRUNK (H): ICD-10-CM

## 2024-06-25 DIAGNOSIS — E11.22 TYPE 2 DIABETES MELLITUS WITH STAGE 3A CHRONIC KIDNEY DISEASE, WITHOUT LONG-TERM CURRENT USE OF INSULIN (H): ICD-10-CM

## 2024-06-25 PROBLEM — L72.3 SEBACEOUS CYST: Status: RESOLVED | Noted: 2022-05-09 | Resolved: 2024-06-25

## 2024-06-25 LAB
ALBUMIN SERPL BCG-MCNC: 4.5 G/DL (ref 3.5–5.2)
ALP SERPL-CCNC: 36 U/L (ref 40–150)
ALT SERPL W P-5'-P-CCNC: 23 U/L (ref 0–70)
ANION GAP SERPL CALCULATED.3IONS-SCNC: 8 MMOL/L (ref 7–15)
AST SERPL W P-5'-P-CCNC: 33 U/L (ref 0–45)
BILIRUB SERPL-MCNC: 0.5 MG/DL
BUN SERPL-MCNC: 21.1 MG/DL (ref 8–23)
CALCIUM SERPL-MCNC: 10.3 MG/DL (ref 8.8–10.2)
CHLORIDE SERPL-SCNC: 104 MMOL/L (ref 98–107)
CHOLEST SERPL-MCNC: 149 MG/DL
CREAT SERPL-MCNC: 1.29 MG/DL (ref 0.67–1.17)
DEPRECATED HCO3 PLAS-SCNC: 27 MMOL/L (ref 22–29)
EGFRCR SERPLBLD CKD-EPI 2021: 60 ML/MIN/1.73M2
FASTING STATUS PATIENT QL REPORTED: ABNORMAL
FASTING STATUS PATIENT QL REPORTED: NORMAL
GLUCOSE SERPL-MCNC: 180 MG/DL (ref 70–99)
HBA1C MFR BLD: 6.4 % (ref 0–5.6)
HDLC SERPL-MCNC: 52 MG/DL
LDLC SERPL CALC-MCNC: 76 MG/DL
NONHDLC SERPL-MCNC: 97 MG/DL
POTASSIUM SERPL-SCNC: 5.2 MMOL/L (ref 3.4–5.3)
PROT SERPL-MCNC: 7.3 G/DL (ref 6.4–8.3)
SODIUM SERPL-SCNC: 139 MMOL/L (ref 135–145)
TRIGL SERPL-MCNC: 107 MG/DL

## 2024-06-25 PROCEDURE — 80061 LIPID PANEL: CPT | Performed by: NURSE PRACTITIONER

## 2024-06-25 PROCEDURE — 83036 HEMOGLOBIN GLYCOSYLATED A1C: CPT | Performed by: NURSE PRACTITIONER

## 2024-06-25 PROCEDURE — 36415 COLL VENOUS BLD VENIPUNCTURE: CPT | Performed by: NURSE PRACTITIONER

## 2024-06-25 PROCEDURE — G0439 PPPS, SUBSEQ VISIT: HCPCS | Performed by: NURSE PRACTITIONER

## 2024-06-25 PROCEDURE — 80053 COMPREHEN METABOLIC PANEL: CPT | Performed by: NURSE PRACTITIONER

## 2024-06-25 PROCEDURE — 99214 OFFICE O/P EST MOD 30 MIN: CPT | Mod: 25 | Performed by: NURSE PRACTITIONER

## 2024-06-25 RX ORDER — FENOFIBRATE 160 MG/1
160 TABLET ORAL DAILY
Qty: 90 TABLET | Refills: 3 | Status: SHIPPED | OUTPATIENT
Start: 2024-06-25

## 2024-06-25 RX ORDER — SIMVASTATIN 80 MG
TABLET ORAL
Qty: 90 TABLET | Refills: 3 | Status: SHIPPED | OUTPATIENT
Start: 2024-06-25

## 2024-06-25 RX ORDER — PROPRANOLOL HCL 60 MG
60 CAPSULE, EXTENDED RELEASE 24HR ORAL DAILY
Qty: 90 CAPSULE | Refills: 3 | Status: SHIPPED | OUTPATIENT
Start: 2024-06-25

## 2024-06-25 RX ORDER — GLIPIZIDE 5 MG/1
5 TABLET, FILM COATED, EXTENDED RELEASE ORAL DAILY
Qty: 90 TABLET | Refills: 1 | Status: SHIPPED | OUTPATIENT
Start: 2024-06-25

## 2024-06-25 SDOH — HEALTH STABILITY: PHYSICAL HEALTH: ON AVERAGE, HOW MANY DAYS PER WEEK DO YOU ENGAGE IN MODERATE TO STRENUOUS EXERCISE (LIKE A BRISK WALK)?: 3 DAYS

## 2024-06-25 SDOH — HEALTH STABILITY: PHYSICAL HEALTH: ON AVERAGE, HOW MANY MINUTES DO YOU ENGAGE IN EXERCISE AT THIS LEVEL?: 60 MIN

## 2024-06-25 ASSESSMENT — PAIN SCALES - GENERAL: PAINLEVEL: NO PAIN (0)

## 2024-06-25 ASSESSMENT — SOCIAL DETERMINANTS OF HEALTH (SDOH): HOW OFTEN DO YOU GET TOGETHER WITH FRIENDS OR RELATIVES?: ONCE A WEEK

## 2024-06-25 NOTE — PROGRESS NOTES
"Preventive Care Visit  [facility]  Van Schulz NP,    Jun 25, 2024      Assessment & Plan       ICD-10-CM    1. Encounter for Medicare annual wellness exam  Z00.00       2. Type 2 diabetes mellitus with stage 3a chronic kidney disease, without long-term current use of insulin (H)  E11.22 Lipid panel reflex to direct LDL Non-fasting    N18.31 HEMOGLOBIN A1C     glipiZIDE (GLUCOTROL XL) 5 MG 24 hr tablet     metFORMIN (GLUCOPHAGE) 1000 MG tablet     Comprehensive metabolic panel (BMP + Alb, Alk Phos, ALT, AST, Total. Bili, TP)     Lipid panel reflex to direct LDL Non-fasting     HEMOGLOBIN A1C     Comprehensive metabolic panel (BMP + Alb, Alk Phos, ALT, AST, Total. Bili, TP)      3. Essential tremor  G25.0 propranolol ER (INDERAL LA) 60 MG 24 hr capsule      4. Pure hypercholesterolemia  E78.00 simvastatin (ZOCOR) 80 MG tablet     fenofibrate (TRIGLIDE/LOFIBRA) 160 MG tablet      5. Stage 3a chronic kidney disease (H)  N18.31       6. Benign essential hypertension  I10       7. Malignant melanoma of skin of trunk (H)  C43.59       8. Malignant tumor of prostate (H)  C61         Doing well.  Update med monitoring labs.  Keep working with dermatology for history of melanoma.  History of prostate cancer status post prostatectomy.  Continue annual PSA.  Keep an eye on renal function.  Reviewed healthy lifestyle behaviors.    BMI  Estimated body mass index is 30.77 kg/m  as calculated from the following:    Height as of this encounter: 1.695 m (5' 6.75\").    Weight as of this encounter: 88.5 kg (195 lb).   Weight management plan: Discussed healthy diet and exercise guidelines    Counseling  Appropriate preventive services were discussed with this patient, including applicable screening as appropriate for fall prevention, nutrition, physical activity, Tobacco-use cessation, weight loss and cognition.  Checklist reviewing preventive services available has been given to the patient.  Reviewed " patient's diet, addressing concerns and/or questions.   He is at risk for lack of exercise and has been provided with information to increase physical activity for the benefit of his well-being.     Edmundo Hurt is a 68 year old, presenting for the following:  Annual Visit (Fasting)        6/25/2024     9:33 AM   Additional Questions   Roomed by Marcy Gutiérrez [facility]       Health Care Directive  Patient does not have a Health Care Directive or Living Will: previously reviewed    HPI    Lab Results   Component Value Date    A1C 6.4 11/14/2023    A1C 6.4 05/11/2023    A1C 6.3 11/09/2022    A1C 6.3 04/12/2022    A1C 5.9 11/19/2021     Doing well.  Colonoscopy recently done.  PSAs have dropped down to yearly following prostate surgery.  Last done fall 2023.  Continues to follow with dermatology for melanoma history surveillance.        6/25/2024   General Health   How would you rate your overall physical health? Good   Feel stress (tense, anxious, or unable to sleep) Not at all            6/25/2024   Nutrition   Diet: Diabetic            6/25/2024   Exercise   Days per week of moderate/strenous exercise 3 days   Average minutes spent exercising at this level 60 min            6/25/2024   Social Factors   Frequency of gathering with friends or relatives Once a week   Worry food won't last until get money to buy more Patient declined   Food not last or not have enough money for food? Patient declined   Do you have housing? (Housing is defined as stable permanent housing and does not include staying ouside in a car, in a tent, in an abandoned building, in an overnight shelter, or couch-surfing.) Patient declined   Are you worried about losing your housing? Patient declined   Lack of transportation? Patient declined   Unable to get utilities (heat,electricity)? Patient declined            6/25/2024   Fall Risk   Fallen 2 or more times in the past year? No   Trouble with walking or balance? No             6/25/2024    Activities of Daily Living- Home Safety   Needs help with the following daily activites None of the above   Safety concerns in the home None of the above            6/25/2024   Dental   Dentist two times every year? Yes            6/25/2024   Hearing Screening   Hearing concerns? None of the above            6/25/2024   Driving Risk Screening   Patient/family members have concerns about driving No            6/25/2024   General Alertness/Fatigue Screening   Have you been more tired than usual lately? No            6/25/2024   Urinary Incontinence Screening   Bothered by leaking urine in past 6 months No               Today's PHQ-2 Score:       6/25/2024     9:33 AM   PHQ-2 ( 1999 [employer])   Q1: Little interest or pleasure in doing things 0   Q2: Feeling down, depressed or hopeless 0   PHQ-2 Score 0   Q1: Little interest or pleasure in doing things Not at all   Q2: Feeling down, depressed or hopeless Not at all   PHQ-2 Score 0           6/25/2024   Substance Use   Alcohol more than 3/day or more than 7/wk No   Do you have a current opioid prescription? No   How severe/bad is pain from 1 to 10? 0/10 (No Pain)   Do you use any other substances recreationally? (!) DECLINE        Social History     Tobacco Use    Smoking status: Never     Passive exposure: Never    Smokeless tobacco: Never   Vaping Use    Vaping status: Never Used   Substance Use Topics    Alcohol use: Yes     Alcohol/week: 3.0 standard drinks of alcohol     Types: 3 Standard drinks or equivalent per week     Comment: 2 drinks/ month    Drug use: No           6/25/2024   AAA Screening   Family history of Abdominal Aortic Aneurysm (AAA)? No      Last PSA:   Prostate Specific Antigen Screen   Date Value Ref Range Status   06/12/2018 9.7 (H) 0.0 - 4.5 ng/mL Final     PSA Tumor Marker   Date Value Ref Range Status   11/14/2023 <0.01 0.00 - 4.50 ng/mL Final     ASCVD Risk   The 10-year ASCVD risk score (Angus DK, et al., 2019) is: 26.8%    Values  used to calculate the score:      Age: 68 years      Sex: Male      Is Non- : No      Diabetic: Yes      Tobacco smoker: No      Systolic Blood Pressure: 124 mmHg      Is BP treated: Yes      HDL Cholesterol: 51 mg/dL      Total Cholesterol: 134 mg/dL      Reviewed and updated as needed this visit by Provider                    Past Medical History:   Diagnosis Date    Cancer (H) 04/01/2017    BCC on back    Diabetes mellitus (H)     GERD (gastroesophageal reflux disease)     High cholesterol     Hypertension      Past Surgical History:   Procedure Laterality Date    COLONOSCOPY  12/2021    EXCISE GANGLION WRIST      EXCISE MASS TRUNK N/A 06/07/2022    Procedure: EXCISION, MASS, TORSO- back;  Surgeon: Rj Bañuelos DO;  Location: [facility] OR    OTHER SURGICAL HISTORY      vastectomy    PILONIDAL CYST / SINUS EXCISION      OH LAP,PROSTATECTOMY,RADICAL,W/NERVE SPARE,INCL ROBOTIC Bilateral 08/28/2018    Procedure: ROBOTIC ASSISTED RADICAL RETROPUBIC PROSTATECTOMY BILATERAL PELVIC LYMPH NODE DISSECTION LYSIS OF ADHESIONS;  Surgeon: Abhishek Phillips MD;  Location: [facility] [place];  Service: Urology    RELEASE TRIGGER FINGER       Current providers sharing in care for this patient include:  Patient Care Team:  Van Schulz NP as PCP - General  Van Schulz NP as Assigned PCP    The following health maintenance items are reviewed in Epic and correct as of today:  Health Maintenance   Topic Date Due    URINALYSIS  Never done    Pneumococcal Vaccine: 65+ Years (3 of 3 - PPSV23 or PCV20) 01/05/2022    ANNUAL REVIEW OF HM ORDERS  04/12/2023    MEDICARE ANNUAL WELLNESS VISIT  05/11/2024    LIPID  05/11/2024    A1C  05/14/2024    COVID-19 Vaccine (7 - 2023-24 season) 05/18/2024    HEMOGLOBIN  08/07/2024    EYE EXAM  11/08/2024    BMP  11/14/2024    MICROALBUMIN  11/14/2024    DIABETIC FOOT EXAM  11/14/2024    FALL RISK ASSESSMENT  06/25/2025    DTAP/TDAP/TD IMMUNIZATION (3 - Td  "or Tdap) 07/15/2026    COLORECTAL CANCER SCREENING  01/09/2027    ADVANCE CARE PLANNING  08/07/2028    PHQ-2 (once per calendar year)  Completed    INFLUENZA VACCINE  Completed    ZOSTER IMMUNIZATION  Completed    RSV VACCINE (Pregnancy & 60+)  Completed    HEPATITIS C SCREENING  Addressed    IPV IMMUNIZATION  Aged Out    HPV IMMUNIZATION  Aged Out    MENINGITIS IMMUNIZATION  Aged Out    RSV MONOCLONAL ANTIBODY  Aged Out         Review of Systems  Constitutional, HEENT, cardiovascular, pulmonary, gi and gu systems are negative, except as otherwise noted.     Objective    Exam  /70 (BP Location: Right arm, Patient Position: Sitting, Cuff Size: Adult Regular)   Pulse 51   Temp 98.1  F (36.7  C) (Oral)   Resp 18   Ht 1.695 m (5' 6.75\")   Wt 88.5 kg (195 lb)   SpO2 97%   BMI 30.77 kg/m     Estimated body mass index is 30.77 kg/m  as calculated from the following:    Height as of this encounter: 1.695 m (5' 6.75\").    Weight as of this encounter: 88.5 kg (195 lb).    Physical Exam  GENERAL: alert and no distress  EYES: Eyes grossly normal to inspection, PERRL and conjunctivae and sclerae normal  HENT: ear canals and TM's normal, nose and mouth without ulcers or lesions  NECK: no adenopathy, no asymmetry, masses, or scars  RESP: lungs clear to auscultation - no rales, rhonchi or wheezes  CV: regular rate and rhythm, normal S1 S2, no S3 or S4, no murmur, click or rub, no peripheral edema  ABDOMEN: soft, nontender, no hepatosplenomegaly, no masses and bowel sounds normal  MS: no gross musculoskeletal defects noted, no edema  SKIN: no suspicious lesions or rashes  NEURO: Normal strength and tone, mentation intact and speech normal  PSYCH: mentation appears normal, affect normal/bright        6/25/2024   Mini Cog   Clock Draw Score 2 Normal   3 Item Recall 2 objects recalled   Mini Cog Total Score 4                 Signed Electronically by: Van Schulz NP    "

## 2024-06-25 NOTE — PATIENT INSTRUCTIONS
"Updating routine labs today    I think I got everything fixed with your prescriptions, but let me know if something is off/wrong.    Shots are up-to-date    Patient Education   Preventive Care Advice   This is general advice we often give to help people stay healthy. Your care team may have specific advice just for you. Please talk to your care team about your own preventive care needs.  Lifestyle  Exercise at least 150 minutes each week (30 minutes a day, 5 days a week).  Do muscle strengthening activities 2 days a week. These help control your weight and prevent disease.  No smoking.  Wear sunscreen to prevent skin cancer.  Have your home tested for radon every 2 to 5 years. Radon is a colorless, odorless gas that can harm your lungs. To learn more, go to www.health.state.[place] and search for \"Radon in Homes.\"  Keep guns unloaded and locked up in a safe place like a safe or gun vault, or, use a gun lock and hide the keys. Always lock away bullets separately. To learn more, visit [employer].gov and search for \"safe gun storage.\"  Nutrition  Eat 5 or more servings of fruits and vegetables each day.  Try wheat bread, brown rice and whole grain pasta (instead of white bread, rice, and pasta).  Get enough calcium and vitamin D. Check the label on foods and aim for 100% of the RDA (recommended daily allowance).  Regular exams  Have a dental exam and cleaning every 6 months.  See your health care team every year to talk about:  Any changes in your health.  Any medicines your care team has prescribed.  Preventive care, family planning, and ways to prevent chronic diseases.  Shots (vaccines)   HPV shots (up to age 26), if you've never had them before.  Hepatitis B shots (up to age 59), if you've never had them before.  COVID-19 shot: Get this shot when it's due.  Flu shot: Get a flu shot every year.  Tetanus shot: Get a tetanus shot every 10 years.  Pneumococcal, hepatitis A, and RSV shots: Ask your care team if you need these " based on your risk.  Shingles shot (for age 50 and up).  General health tests  Diabetes screening:  Starting at age 35, Get screened for diabetes at least every 3 years.  If you are younger than age 35, ask your care team if you should be screened for diabetes.  Cholesterol test: At age 39, start having a cholesterol test every 5 years, or more often if advised.  Bone density scan (DEXA): At age 50, ask your care team if you should have this scan for osteoporosis (brittle bones).  Hepatitis C: Get tested at least once in your life.  Abdominal aortic aneurysm screening: Talk to your doctor about having this screening if you:  Have ever smoked; and  Are biologically male; and  Are between the ages of 65 and 75.  STIs (sexually transmitted infections)  Before age 24: Ask your care team if you should be screened for STIs.  After age 24: Get screened for STIs if you're at risk. You are at risk for STIs (including HIV) if:  You are sexually active with more than one person.  You don't use condoms every time.  You or a partner was diagnosed with a sexually transmitted infection.  If you are at risk for HIV, ask about PrEP medicine to prevent HIV.  Get tested for HIV at least once in your life, whether you are at risk for HIV or not.  Cancer screening tests  Cervical cancer screening: If you have a cervix, begin getting regular cervical cancer screening tests at age 21. Most people who have regular screenings with normal results can stop after age 65. Talk about this with your provider.  Breast cancer scan (mammogram): If you've ever had breasts, begin having regular mammograms starting at age 40. This is a scan to check for breast cancer.  Colon cancer screening: It is important to start screening for colon cancer at age 45.  Have a colonoscopy test every 10 years (or more often if you're at risk) Or, ask your provider about stool tests like a FIT test every year or Cologuard test every 3 years.  To learn more about your  testing options, visit: www.[employer]/765381.pdf.  For help making a decision, visit: lynn.edy/pv85742.  Prostate cancer screening test: If you have a prostate and are age 55 to 69, ask your provider if you would benefit from a yearly prostate cancer screening test.  Lung cancer screening: If you are a current or former smoker age 50 to 80, ask your care team if ongoing lung cancer screenings are right for you.  For informational purposes only. Not to replace the advice of your health care provider. Copyright   2023 [facility]. All rights reserved. Clinically reviewed by the [facility] Transitions Program. [employer] 319261 - REV 04/24.

## 2024-07-26 ENCOUNTER — TRANSFERRED RECORDS (OUTPATIENT)
Dept: HEALTH INFORMATION MANAGEMENT | Facility: CLINIC | Age: 69
End: 2024-07-26
Payer: MEDICARE

## 2024-08-26 ENCOUNTER — TRANSFERRED RECORDS (OUTPATIENT)
Dept: HEALTH INFORMATION MANAGEMENT | Facility: CLINIC | Age: 69
End: 2024-08-26
Payer: MEDICARE

## 2024-09-26 ENCOUNTER — TRANSFERRED RECORDS (OUTPATIENT)
Dept: HEALTH INFORMATION MANAGEMENT | Facility: CLINIC | Age: 69
End: 2024-09-26
Payer: MEDICARE

## 2024-09-27 DIAGNOSIS — K21.00 GASTROESOPHAGEAL REFLUX DISEASE WITH ESOPHAGITIS WITHOUT HEMORRHAGE: ICD-10-CM

## 2024-09-27 RX ORDER — OMEPRAZOLE 40 MG/1
40 CAPSULE, DELAYED RELEASE ORAL DAILY
Qty: 90 CAPSULE | Refills: 2 | Status: SHIPPED | OUTPATIENT
Start: 2024-09-27

## 2024-10-11 ENCOUNTER — TRANSFERRED RECORDS (OUTPATIENT)
Dept: MULTI SPECIALTY CLINIC | Facility: CLINIC | Age: 69
End: 2024-10-11

## 2024-10-11 LAB — RETINOPATHY: NORMAL

## 2024-10-21 ENCOUNTER — MYC MEDICAL ADVICE (OUTPATIENT)
Dept: FAMILY MEDICINE | Facility: CLINIC | Age: 69
End: 2024-10-21
Payer: MEDICARE

## 2024-10-21 ENCOUNTER — NURSE TRIAGE (OUTPATIENT)
Dept: FAMILY MEDICINE | Facility: CLINIC | Age: 69
End: 2024-10-21
Payer: MEDICARE

## 2024-10-21 NOTE — TELEPHONE ENCOUNTER
Nurse Triage SBAR    Is this a 2nd Level Triage? YES, LICENSED PRACTITIONER REVIEW IS REQUIRED    Situation: Persistent cough and runny nose    Background: Has been an issue for approximately 9 days    Assessment: Patient wrote in with complaints of a persistent runny nose and cough which have been present since 10/12. He states  his cough is productive with greyish mucus, and that he had a fever a few days ago which has since resolved. He also endorses intermittent wheezing and chest pain. He states they do not last long and his wheezing is worse when he lays down at night. During the day, he states that he is typically fine and doesn't have many issues with his breathing.     Due to having some wheezing present along with mild difficulty breathing, it is recommended he be seen today. Patient would like to avoid an UC visit so he was placed on the schedule to be seen tomorrow pending provider review. Instructions given for when to be evaluated in an ED and patient verbalized understanding.    Protocol Recommended Disposition:   Go To Office Now    Recommendation: Go to office now, patient would like to avoid an UC visit so appointment set for tomorrow.      Routed to provider    Does the patient meet one of the following criteria for ADS visit consideration? 16+ years old, with an [facility] PCP     TIP  Providers, please consider if this condition is appropriate for management at one of our Acute and Diagnostic Services sites.     If patient is a good candidate, please use dotphrase <dot>triageresponse and select Refer to ADS to document.  Reason for Disposition   MILD difficulty breathing (e.g., minimal/no SOB at rest, SOB with walking, pulse <100) and still present when not coughing    Additional Information   Negative: Bluish (or gray) lips or face   Negative: SEVERE difficulty breathing (e.g., struggling for each breath, speaks in single words)   Negative: Rapid onset of cough and has hives   Negative: Coughing  "started suddenly after medicine, an allergic food or bee sting   Negative: Difficulty breathing after exposure to flames, smoke, or fumes   Negative: Sounds like a life-threatening emergency to the triager   Negative: Previous asthma attacks and this feels like asthma attack   Negative: Dry cough (non-productive; no sputum or minimal clear sputum) and within 14 days of COVID-19 Exposure   Negative: MODERATE difficulty breathing (e.g., speaks in phrases, SOB even at rest, pulse 100-120) and still present when not coughing   Negative: Chest pain present when not coughing   Negative: Passed out (i.e., fainted, collapsed and was not responding)   Negative: Patient sounds very sick or weak to the triager    Answer Assessment - Initial Assessment Questions  1. ONSET: \"When did the cough begin?\"       10/12  2. SEVERITY: \"How bad is the cough today?\"       Coughing quite a bit  3. SPUTUM: \"Describe the color of your sputum\" (none, dry cough; clear, white, yellow, green)      Greyish  4. HEMOPTYSIS: \"Are you coughing up any blood?\" If so ask: \"How much?\" (flecks, streaks, tablespoons, etc.)      No  5. DIFFICULTY BREATHING: \"Are you having difficulty breathing?\" If Yes, ask: \"How bad is it?\" (e.g., mild, moderate, severe)     - MILD: No SOB at rest, mild SOB with walking, speaks normally in sentences, can lie down, no retractions, pulse < 100.     - MODERATE: SOB at rest, SOB with minimal exertion and prefers to sit, cannot lie down flat, speaks in phrases, mild retractions, audible wheezing, pulse 100-120.     - SEVERE: Very SOB at rest, speaks in single words, struggling to breathe, sitting hunched forward, retractions, pulse > 120       Mild-moderate  6. FEVER: \"Do you have a fever?\" If Yes, ask: \"What is your temperature, how was it measured, and when did it start?\"      Fever a few days ago but none now  7. CARDIAC HISTORY: \"Do you have any history of heart disease?\" (e.g., heart attack, congestive heart failure)       " "No  8. LUNG HISTORY: \"Do you have any history of lung disease?\"  (e.g., pulmonary embolus, asthma, emphysema)      No  9. PE RISK FACTORS: \"Do you have a history of blood clots?\" (or: recent major surgery, recent prolonged travel, bedridden)      No  10. OTHER SYMPTOMS: \"Do you have any other symptoms?\" (e.g., runny nose, wheezing, chest pain)        Runny, wheezing,   11. PREGNANCY: \"Is there any chance you are pregnant?\" \"When was your last menstrual period?\"        No  12. TRAVEL: \"Have you traveled out of the country in the last month?\" (e.g., travel history, exposures)        No    Protocols used: Cough-A-OH    Cornelia Hadley RN  [facility]  "

## 2024-10-22 ENCOUNTER — OFFICE VISIT (OUTPATIENT)
Dept: FAMILY MEDICINE | Facility: CLINIC | Age: 69
End: 2024-10-22
Payer: MEDICARE

## 2024-10-22 ENCOUNTER — ANCILLARY PROCEDURE (OUTPATIENT)
Dept: GENERAL RADIOLOGY | Facility: CLINIC | Age: 69
End: 2024-10-22
Attending: NURSE PRACTITIONER
Payer: MEDICARE

## 2024-10-22 VITALS
BODY MASS INDEX: 30.13 KG/M2 | OXYGEN SATURATION: 94 % | TEMPERATURE: 98.8 F | HEIGHT: 67 IN | WEIGHT: 192 LBS | RESPIRATION RATE: 16 BRPM | SYSTOLIC BLOOD PRESSURE: 138 MMHG | DIASTOLIC BLOOD PRESSURE: 76 MMHG | HEART RATE: 62 BPM

## 2024-10-22 DIAGNOSIS — J22 LOWER RESPIRATORY INFECTION (E.G., BRONCHITIS, PNEUMONIA, PNEUMONITIS, PULMONITIS): Primary | ICD-10-CM

## 2024-10-22 DIAGNOSIS — J22 LOWER RESPIRATORY INFECTION (E.G., BRONCHITIS, PNEUMONIA, PNEUMONITIS, PULMONITIS): ICD-10-CM

## 2024-10-22 PROCEDURE — 99214 OFFICE O/P EST MOD 30 MIN: CPT | Performed by: NURSE PRACTITIONER

## 2024-10-22 PROCEDURE — 71046 X-RAY EXAM CHEST 2 VIEWS: CPT | Mod: TC | Performed by: RADIOLOGY

## 2024-10-22 RX ORDER — DOXYCYCLINE 100 MG/1
100 CAPSULE ORAL 2 TIMES DAILY
Qty: 20 CAPSULE | Refills: 0 | Status: SHIPPED | OUTPATIENT
Start: 2024-10-22 | End: 2024-10-22

## 2024-10-22 RX ORDER — ALBUTEROL SULFATE 90 UG/1
2 INHALANT RESPIRATORY (INHALATION) EVERY 6 HOURS PRN
Qty: 18 G | Refills: 0 | Status: SHIPPED | OUTPATIENT
Start: 2024-10-22

## 2024-10-22 RX ORDER — DOXYCYCLINE 100 MG/1
100 CAPSULE ORAL 2 TIMES DAILY
Qty: 14 CAPSULE | Refills: 0 | Status: SHIPPED | OUTPATIENT
Start: 2024-10-22 | End: 2024-10-29

## 2024-10-22 ASSESSMENT — ENCOUNTER SYMPTOMS: COUGH: 1

## 2024-10-22 ASSESSMENT — PAIN SCALES - GENERAL: PAINLEVEL: NO PAIN (0)

## 2024-10-22 NOTE — PROGRESS NOTES
"  Edmundo Hurt is a 68 year old, presenting for the following health issues:  Cough (3 weeks cough running nose, low grade fever, mucus phlegm, grayish )      10/22/2024     3:05 PM   Additional Questions   Roomed by daiana estes cma     History of Present Illness       Reason for visit:  Prolong cough  Symptom onset:  3-4 weeks ago  Symptoms include:  Runny and cough  Symptom intensity:  Moderate  Symptom progression:  Worsening  Had these symptoms before:  No   He is taking medications regularly.       Acute Illness  Acute illness concerns: cough Onset/Duration: x 3 weeks  Symptoms:  Fever: No  Chills/Sweats: No  Headache (location?): No  Sinus Pressure: No  Conjunctivitis:  No  Ear Pain: no  Rhinorrhea: No  Congestion: YES  Sore Throat: No  Cough: YES-productive of green sputum, worsening over time  Wheeze: YES  Decreased Appetite: No  Nausea: No  Vomiting: No  Diarrhea: No  Dysuria/Freq.: No  Dysuria or Hematuria: No  Fatigue/Achiness: YES  Sick/Strep Exposure: No  Therapies tried and outcome: None        Objective    /76 (BP Location: Left arm, Patient Position: Sitting, Cuff Size: Adult Regular)   Pulse 62   Temp 98.8  F (37.1  C)   Resp 16   Ht 1.695 m (5' 6.75\")   Wt 87.1 kg (192 lb)   SpO2 94%   BMI 30.30 kg/m    Body mass index is 30.3 kg/m .  Physical Exam   GENERAL: alert and no distress  HENT: ear canals and TM's normal, nose and mouth without ulcers or lesions  RESP: rales R mid posterior and R lower posterior  CV: regular rate and rhythm, normal S1 S2, no S3 or S4, no murmur, click or rub, no peripheral edema   MS: no gross musculoskeletal defects noted, no edema  PSYCH: mentation appears normal, affect normal/bright    CXR - Reviewed and interpreted by me awaiting formal interpretation from Radiologist at this time      A/P:  1. Lower respiratory infection (e.g., bronchitis, pneumonia, pneumonitis, pulmonitis)  - XR Chest 2 Views; Future  - albuterol (PROAIR HFA/PROVENTIL HFA/VENTOLIN " HFA) 108 (90 Base) MCG/ACT inhaler; Inhale 2 puffs into the lungs every 6 hours as needed for shortness of breath, wheezing or cough.  Dispense: 18 g; Refill: 0  - amoxicillin-clavulanate (AUGMENTIN) 875-125 MG tablet; Take 1 tablet by mouth 2 times daily for 7 days.  Dispense: 14 tablet; Refill: 0  - doxycycline hyclate (VIBRAMYCIN) 100 MG capsule; Take 1 capsule (100 mg) by mouth 2 times daily for 7 days.  Dispense: 14 capsule; Refill: 0    Signed Electronically by: Maame Stauffer CNP

## 2024-11-15 SDOH — HEALTH STABILITY: PHYSICAL HEALTH: ON AVERAGE, HOW MANY MINUTES DO YOU ENGAGE IN EXERCISE AT THIS LEVEL?: 60 MIN

## 2024-11-15 SDOH — HEALTH STABILITY: PHYSICAL HEALTH: ON AVERAGE, HOW MANY DAYS PER WEEK DO YOU ENGAGE IN MODERATE TO STRENUOUS EXERCISE (LIKE A BRISK WALK)?: 2 DAYS

## 2024-11-15 ASSESSMENT — SOCIAL DETERMINANTS OF HEALTH (SDOH): HOW OFTEN DO YOU GET TOGETHER WITH FRIENDS OR RELATIVES?: ONCE A WEEK

## 2024-11-19 ENCOUNTER — OFFICE VISIT (OUTPATIENT)
Dept: FAMILY MEDICINE | Facility: CLINIC | Age: 69
End: 2024-11-19
Payer: MEDICARE

## 2024-11-19 VITALS
DIASTOLIC BLOOD PRESSURE: 70 MMHG | SYSTOLIC BLOOD PRESSURE: 112 MMHG | RESPIRATION RATE: 16 BRPM | WEIGHT: 191.6 LBS | BODY MASS INDEX: 29.04 KG/M2 | TEMPERATURE: 98.1 F | OXYGEN SATURATION: 95 % | HEIGHT: 68 IN | HEART RATE: 52 BPM

## 2024-11-19 DIAGNOSIS — N18.31 TYPE 2 DIABETES MELLITUS WITH STAGE 3A CHRONIC KIDNEY DISEASE, WITHOUT LONG-TERM CURRENT USE OF INSULIN (H): Primary | ICD-10-CM

## 2024-11-19 DIAGNOSIS — N18.31 STAGE 3A CHRONIC KIDNEY DISEASE (H): ICD-10-CM

## 2024-11-19 DIAGNOSIS — I10 ESSENTIAL HYPERTENSION, BENIGN: ICD-10-CM

## 2024-11-19 DIAGNOSIS — Z85.46 HISTORY OF PROSTATE CANCER: ICD-10-CM

## 2024-11-19 DIAGNOSIS — E11.22 TYPE 2 DIABETES MELLITUS WITH STAGE 3A CHRONIC KIDNEY DISEASE, WITHOUT LONG-TERM CURRENT USE OF INSULIN (H): Primary | ICD-10-CM

## 2024-11-19 DIAGNOSIS — D64.9 ANEMIA, UNSPECIFIED TYPE: ICD-10-CM

## 2024-11-19 LAB
ALBUMIN SERPL BCG-MCNC: 4.3 G/DL (ref 3.5–5.2)
ALBUMIN UR-MCNC: NEGATIVE MG/DL
ALP SERPL-CCNC: 39 U/L (ref 40–150)
ALT SERPL W P-5'-P-CCNC: 24 U/L (ref 0–70)
ANION GAP SERPL CALCULATED.3IONS-SCNC: 9 MMOL/L (ref 7–15)
APPEARANCE UR: CLEAR
AST SERPL W P-5'-P-CCNC: 32 U/L (ref 0–45)
BILIRUB SERPL-MCNC: 0.5 MG/DL
BILIRUB UR QL STRIP: NEGATIVE
BUN SERPL-MCNC: 20.8 MG/DL (ref 8–23)
CALCIUM SERPL-MCNC: 10 MG/DL (ref 8.8–10.4)
CHLORIDE SERPL-SCNC: 105 MMOL/L (ref 98–107)
COLOR UR AUTO: YELLOW
CREAT SERPL-MCNC: 1.39 MG/DL (ref 0.67–1.17)
CREAT UR-MCNC: 172 MG/DL
EGFRCR SERPLBLD CKD-EPI 2021: 55 ML/MIN/1.73M2
ERYTHROCYTE [DISTWIDTH] IN BLOOD BY AUTOMATED COUNT: 12.9 % (ref 10–15)
EST. AVERAGE GLUCOSE BLD GHB EST-MCNC: 126 MG/DL
GLUCOSE SERPL-MCNC: 106 MG/DL (ref 70–99)
GLUCOSE UR STRIP-MCNC: NEGATIVE MG/DL
HBA1C MFR BLD: 6 % (ref 0–5.6)
HCO3 SERPL-SCNC: 27 MMOL/L (ref 22–29)
HCT VFR BLD AUTO: 37.3 % (ref 40–53)
HGB BLD-MCNC: 12.7 G/DL (ref 13.3–17.7)
HGB UR QL STRIP: NEGATIVE
KETONES UR STRIP-MCNC: NEGATIVE MG/DL
LEUKOCYTE ESTERASE UR QL STRIP: NEGATIVE
MCH RBC QN AUTO: 32.1 PG (ref 26.5–33)
MCHC RBC AUTO-ENTMCNC: 34 G/DL (ref 31.5–36.5)
MCV RBC AUTO: 94 FL (ref 78–100)
MICROALBUMIN UR-MCNC: 27.2 MG/L
MICROALBUMIN/CREAT UR: 15.81 MG/G CR (ref 0–17)
NITRATE UR QL: NEGATIVE
PH UR STRIP: 7.5 [PH] (ref 5–8)
PLATELET # BLD AUTO: 292 10E3/UL (ref 150–450)
POTASSIUM SERPL-SCNC: 5.1 MMOL/L (ref 3.4–5.3)
PROT SERPL-MCNC: 7.2 G/DL (ref 6.4–8.3)
PSA SERPL DL<=0.01 NG/ML-MCNC: <0.01 NG/ML (ref 0–6.5)
RBC # BLD AUTO: 3.96 10E6/UL (ref 4.4–5.9)
SODIUM SERPL-SCNC: 141 MMOL/L (ref 135–145)
SP GR UR STRIP: 1.02 (ref 1–1.03)
UROBILINOGEN UR STRIP-ACNC: 0.2 E.U./DL
WBC # BLD AUTO: 6 10E3/UL (ref 4–11)

## 2024-11-19 PROCEDURE — 99214 OFFICE O/P EST MOD 30 MIN: CPT | Performed by: NURSE PRACTITIONER

## 2024-11-19 PROCEDURE — 80053 COMPREHEN METABOLIC PANEL: CPT | Performed by: NURSE PRACTITIONER

## 2024-11-19 PROCEDURE — 82728 ASSAY OF FERRITIN: CPT | Performed by: NURSE PRACTITIONER

## 2024-11-19 PROCEDURE — 82043 UR ALBUMIN QUANTITATIVE: CPT | Performed by: NURSE PRACTITIONER

## 2024-11-19 PROCEDURE — 36415 COLL VENOUS BLD VENIPUNCTURE: CPT | Performed by: NURSE PRACTITIONER

## 2024-11-19 PROCEDURE — 81003 URINALYSIS AUTO W/O SCOPE: CPT | Performed by: NURSE PRACTITIONER

## 2024-11-19 PROCEDURE — 84153 ASSAY OF PSA TOTAL: CPT | Performed by: NURSE PRACTITIONER

## 2024-11-19 PROCEDURE — 83540 ASSAY OF IRON: CPT | Performed by: NURSE PRACTITIONER

## 2024-11-19 PROCEDURE — 85027 COMPLETE CBC AUTOMATED: CPT | Performed by: NURSE PRACTITIONER

## 2024-11-19 PROCEDURE — 83036 HEMOGLOBIN GLYCOSYLATED A1C: CPT | Performed by: NURSE PRACTITIONER

## 2024-11-19 PROCEDURE — G2211 COMPLEX E/M VISIT ADD ON: HCPCS | Performed by: NURSE PRACTITIONER

## 2024-11-19 PROCEDURE — 82570 ASSAY OF URINE CREATININE: CPT | Performed by: NURSE PRACTITIONER

## 2024-11-19 PROCEDURE — 83550 IRON BINDING TEST: CPT | Performed by: NURSE PRACTITIONER

## 2024-11-19 PROCEDURE — 82607 VITAMIN B-12: CPT | Performed by: NURSE PRACTITIONER

## 2024-11-19 RX ORDER — GLIPIZIDE 5 MG/1
5 TABLET, FILM COATED, EXTENDED RELEASE ORAL DAILY
Qty: 90 TABLET | Refills: 1 | Status: SHIPPED | OUTPATIENT
Start: 2024-11-19

## 2024-11-19 RX ORDER — LISINOPRIL AND HYDROCHLOROTHIAZIDE 10; 12.5 MG/1; MG/1
TABLET ORAL
Qty: 90 TABLET | Refills: 3 | Status: SHIPPED | OUTPATIENT
Start: 2024-11-19

## 2024-11-19 ASSESSMENT — PAIN SCALES - GENERAL: PAINLEVEL_OUTOF10: NO PAIN (0)

## 2024-11-19 NOTE — PROGRESS NOTES
"Assessment & Plan     ICD-10-CM    1. Type 2 diabetes mellitus with stage 3a chronic kidney disease, without long-term current use of insulin (H)  E11.22 glipiZIDE (GLUCOTROL XL) 5 MG 24 hr tablet    N18.31 metFORMIN (GLUCOPHAGE) 1000 MG tablet     CBC with platelets     Comprehensive metabolic panel (BMP + Alb, Alk Phos, ALT, AST, Total. Bili, TP)     Hemoglobin A1c      2. Essential hypertension, benign  I10 lisinopril-hydrochlorothiazide (ZESTORETIC) 10-12.5 MG tablet      3. Stage 3a chronic kidney disease (H)  N18.31 Albumin Random Urine Quantitative with Creat Ratio     UA Macroscopic with reflex to Microscopic and Culture      4. History of prostate cancer  Z85.46 PSA, tumor marker        Update med monitoring labs.  Eye exam up-to-date.  Discussed ways to manage cough.  Offered steroid taper.  Is naturally improving over the week.  Will continue to monitor symptoms.    Reviewed CXR x1    The longitudinal plan of care for the diagnosis(es)/condition(s) as documented were addressed during this visit. Due to the added complexity in care, I will continue to support Blu in the subsequent management and with ongoing continuity of care.      Subjective     HPI     Lab Results   Component Value Date    A1C 6.4 06/25/2024    A1C 6.4 11/14/2023    A1C 6.4 05/11/2023    A1C 6.3 11/09/2022    A1C 6.3 04/12/2022     Eye exam updated.      Flu shot received. Plans to get covid later. Due for pneumonia in 2026.      Seen for a cough a few weeks ago.  Received doxycycline and Augmentin.  Systemically improved.  Still has residual annoying cough.  Has improved a bit over the last week.  No hemoptysis.      Review of Systems - negative except for what's listed in the HPI      Objective    /70 (BP Location: Left arm, Patient Position: Sitting, Cuff Size: Adult Regular)   Pulse 52   Temp 98.1  F (36.7  C)   Resp 16   Ht 1.715 m (5' 7.5\")   Wt 86.9 kg (191 lb 9.6 oz)   SpO2 95%   BMI 29.57 kg/m    Physical Exam "   General appearance - alert, well appearing, and in no distress  Mental status - alert, oriented to person, place, and time  Lymphatics - no palpable lymphadenopathy  Chest - clear to auscultation, no wheezes, rales or rhonchi, symmetric air entry  Heart - normal rate and regular rhythm, S1 and S2 normal, no murmurs noted  Abdomen - soft, nontender, nondistended, no masses or organomegaly  Neurological - alert, oriented, normal speech, no focal findings or movement disorder noted, neck supple without rigidity, cranial nerves II through XII intact, motor and sensory grossly normal bilaterally, normal muscle tone, no tremors, strength 5/5, normal sensation with monofilament probing along the plantar surface of the feet.  Extremities - peripheral pulses normal, no peripheral edema  Skin - normal coloration and turgor.    Van Schulz, CNP    This note has been dictated using voice recognition software. Any grammatical or context distortions are unintentional and inherent to the software.

## 2024-11-19 NOTE — PROGRESS NOTES
Preventive Care Visit  [facility]  Van Schulz NP,    Nov 19, 2024  {Provider  Link to SmartSet :511510}    {PROVIDER CHARTING PREFERENCE:056122}    Edmundo Hurt is a 69 year old, presenting for the following:  Annual Visit (Fasting)        11/19/2024     8:40 AM   Additional Questions   Roomed by Marcy Gutiérrez CMA     {ROOMER if patient is in their first year of Medicare a vision screen is required click here to document the Vison screen and then refresh the note to pull in results  :913828}    Via the Health Maintenance questionnaire, the patient has reported the following services have been completed -Eye Exam: [facility] [place] 2024-10-11, this information has been sent to the abstraction team.    HPI  ***  {MA/LPN/RN Pre-Provider Visit Orders- hCG/UA/Strep (Optional):940878}  {SUPERLIST (Optional):207355}  {additonal problems for provider to add (Optional):979607}  Health Care Directive  Patient does not have a Health Care Directive: {ADVANCE_DIRECTIVE_STATUS:424675}      11/15/2024   General Health   How would you rate your overall physical health? Good   Feel stress (tense, anxious, or unable to sleep) Not at all            11/15/2024   Nutrition   Diet: Diabetic            11/15/2024   Exercise   Days per week of moderate/strenous exercise 2 days   Average minutes spent exercising at this level 60 min      (!) EXERCISE CONCERN      11/15/2024   Social Factors   Frequency of gathering with friends or relatives Once a week   Worry food won't last until get money to buy more No   Food not last or not have enough money for food? No   Do you have housing? (Housing is defined as stable permanent housing and does not include staying ouside in a car, in a tent, in an abandoned building, in an overnight shelter, or couch-surfing.) Yes   Are you worried about losing your housing? No   Lack of transportation? No   Unable to get utilities (heat,electricity)? No             11/15/2024   Fall Risk   Fallen 2 or more times in the past year? No    Trouble with walking or balance? No        Patient-reported          11/15/2024   Activities of Daily Living- Home Safety   Needs help with the following daily activites None of the above   Safety concerns in the home None of the above            11/15/2024   Dental   Dentist two times every year? Yes            11/15/2024   Hearing Screening   Hearing concerns? None of the above            11/15/2024   Driving Risk Screening   Patient/family members have concerns about driving No            11/15/2024   General Alertness/Fatigue Screening   Have you been more tired than usual lately? No            11/15/2024   Urinary Incontinence Screening   Bothered by leaking urine in past 6 months No               Today's PHQ-2 Score:       11/19/2024     8:39 AM   PHQ-2 ( 1999 [employer])   Q1: Little interest or pleasure in doing things 0    Q2: Feeling down, depressed or hopeless 0    PHQ-2 Score 0    Q1: Little interest or pleasure in doing things Not at all   Q2: Feeling down, depressed or hopeless Not at all   PHQ-2 Score 0       Patient-reported           11/15/2024   Substance Use   Alcohol more than 3/day or more than 7/wk No   Do you have a current opioid prescription? No   How severe/bad is pain from 1 to 10? 0/10 (No Pain)   Do you use any other substances recreationally? No        Social History     Tobacco Use    Smoking status: Never     Passive exposure: Never    Smokeless tobacco: Never   Vaping Use    Vaping status: Never Used   Substance Use Topics    Alcohol use: Yes     Alcohol/week: 3.0 standard drinks of alcohol     Types: 3 Standard drinks or equivalent per week     Comment: 2 drinks/ month    Drug use: No     {Provider  If there are gaps in the social history shown above, please follow the link to update and then refresh the note Link to Social and Substance History :914446}      11/15/2024   AAA Screening   Family history of Abdominal  Aortic Aneurysm (AAA)? No      ASCVD Risk   The 10-year ASCVD risk score (Angus SHAH, et al., 2019) is: 25.8%    Values used to calculate the score:      Age: 69 years      Sex: Male      Is Non- : No      Diabetic: Yes      Tobacco smoker: No      Systolic Blood Pressure: 112 mmHg      Is BP treated: Yes      HDL Cholesterol: 52 mg/dL      Total Cholesterol: 149 mg/dL    {Link to Fracture Risk Assessment Tool (Optional):134281}    {Provider  REQUIRED FOR AWV Use the storyboard to review patient history, after sections have been marked as reviewed, refresh note to capture documentation:426591}  {Provider   REQUIRED AWV use this link to review and update sexual activity history  after section has been marked as reviewed, refresh note to capture documentation:646086}  Reviewed and updated as needed this visit by Provider                    {HISTORY OPTIONS (Optional):895799}  Current providers sharing in care for this patient include:  Patient Care Team:  Van Schulz NP as PCP - General  Van Schulz NP as Assigned PCP    The following health maintenance items are reviewed in Epic and correct as of today:  Health Maintenance   Topic Date Due    URINALYSIS  Never done    Pneumococcal Vaccine: 65+ Years (3 of 3 - PPSV23 or PCV20) 01/05/2022    ANNUAL REVIEW OF HM ORDERS  04/12/2023    COVID-19 Vaccine (7 - 2024-25 season) 09/01/2024    EYE EXAM  11/08/2024    MICROALBUMIN  11/14/2024    DIABETIC FOOT EXAM  11/14/2024    HEMOGLOBIN  08/07/2024    A1C  12/25/2024    MEDICARE ANNUAL WELLNESS VISIT  06/25/2025    BMP  06/25/2025    LIPID  06/25/2025    FALL RISK ASSESSMENT  11/19/2025    DTAP/TDAP/TD IMMUNIZATION (3 - Td or Tdap) 07/15/2026    COLORECTAL CANCER SCREENING  01/09/2027    ADVANCE CARE PLANNING  08/07/2028    PHQ-2 (once per calendar year)  Completed    INFLUENZA VACCINE  Completed    ZOSTER IMMUNIZATION  Completed    RSV VACCINE  Completed    HEPATITIS C SCREENING   "Addressed    HPV IMMUNIZATION  Aged Out    MENINGITIS IMMUNIZATION  Aged Out    RSV MONOCLONAL ANTIBODY  Aged Out       {ROS Picklists (Optional):117891}     Objective    Exam  /70 (BP Location: Left arm, Patient Position: Sitting, Cuff Size: Adult Regular)   Pulse 52   Temp 98.1  F (36.7  C)   Resp 16   Ht 1.715 m (5' 7.5\")   Wt 86.9 kg (191 lb 9.6 oz)   SpO2 95%   BMI 29.57 kg/m     Estimated body mass index is 29.57 kg/m  as calculated from the following:    Height as of this encounter: 1.715 m (5' 7.5\").    Weight as of this encounter: 86.9 kg (191 lb 9.6 oz).    Physical Exam  {Exam Choices (Optional):893562}        11/19/2024   Mini Cog   Clock Draw Score 2 Normal   3 Item Recall 1 object recalled   Mini Cog Total Score 3        {A Mini-Cog total score of 0-2 suggests the possibility of dementia, score of 3-5 suggests no dementia:846444}         Signed Electronically by: Van Schulz NP  {Email feedback regarding this note to [email or web address]   :324098}  "

## 2024-11-19 NOTE — PATIENT INSTRUCTIONS
Updating lab work.    If interested in a round of steroids to try to help calm the cough down, let me know.

## 2024-11-20 LAB
FERRITIN SERPL-MCNC: 95 NG/ML (ref 31–409)
IRON BINDING CAPACITY (ROCHE): 409 UG/DL (ref 240–430)
IRON SATN MFR SERPL: 20 % (ref 15–46)
IRON SERPL-MCNC: 81 UG/DL (ref 61–157)
VIT B12 SERPL-MCNC: 427 PG/ML (ref 232–1245)

## 2025-01-27 ENCOUNTER — E-VISIT (OUTPATIENT)
Dept: FAMILY MEDICINE | Facility: CLINIC | Age: 70
End: 2025-01-27
Payer: MEDICARE

## 2025-01-27 DIAGNOSIS — L73.9 FOLLICULITIS: Primary | ICD-10-CM

## 2025-01-27 RX ORDER — DOXYCYCLINE 100 MG/1
100 CAPSULE ORAL 2 TIMES DAILY
Qty: 20 CAPSULE | Refills: 0 | Status: SHIPPED | OUTPATIENT
Start: 2025-01-27 | End: 2025-02-06

## 2025-01-27 NOTE — PROGRESS NOTES
Please call patient and let him know I suspect his rash is due to folliculitis.  Placed a order for doxycycline.  If not better in the next 1 to 2 weeks he is to follow-up in clinic for reevaluation

## 2025-03-06 ENCOUNTER — TRANSFERRED RECORDS (OUTPATIENT)
Dept: HEALTH INFORMATION MANAGEMENT | Facility: CLINIC | Age: 70
End: 2025-03-06
Payer: MEDICARE

## 2025-03-27 ENCOUNTER — TRANSFERRED RECORDS (OUTPATIENT)
Dept: HEALTH INFORMATION MANAGEMENT | Facility: CLINIC | Age: 70
End: 2025-03-27
Payer: MEDICARE

## 2025-06-03 DIAGNOSIS — E78.00 PURE HYPERCHOLESTEROLEMIA: ICD-10-CM

## 2025-06-03 RX ORDER — FENOFIBRATE 160 MG/1
160 TABLET ORAL DAILY
Qty: 90 TABLET | Refills: 0 | Status: SHIPPED | OUTPATIENT
Start: 2025-06-03

## 2025-06-07 ENCOUNTER — HEALTH MAINTENANCE LETTER (OUTPATIENT)
Age: 70
End: 2025-06-07

## 2025-06-26 ENCOUNTER — TRANSFERRED RECORDS (OUTPATIENT)
Dept: HEALTH INFORMATION MANAGEMENT | Facility: CLINIC | Age: 70
End: 2025-06-26
Payer: MEDICARE

## 2025-07-11 PROBLEM — C61 MALIGNANT TUMOR OF PROSTATE (H): Status: RESOLVED | Noted: 2018-07-27 | Resolved: 2025-07-11

## 2025-07-11 PROBLEM — N18.31 STAGE 3A CHRONIC KIDNEY DISEASE (H): Status: ACTIVE | Noted: 2021-07-20

## 2025-07-11 PROBLEM — C43.59 MALIGNANT MELANOMA OF SKIN OF TRUNK (H): Status: RESOLVED | Noted: 2023-09-17 | Resolved: 2025-07-11

## 2025-07-15 DIAGNOSIS — K21.00 GASTROESOPHAGEAL REFLUX DISEASE WITH ESOPHAGITIS WITHOUT HEMORRHAGE: ICD-10-CM

## 2025-07-16 RX ORDER — OMEPRAZOLE 40 MG/1
40 CAPSULE, DELAYED RELEASE ORAL DAILY
Qty: 90 CAPSULE | Refills: 2 | Status: SHIPPED | OUTPATIENT
Start: 2025-07-16